# Patient Record
Sex: FEMALE | Race: ASIAN | NOT HISPANIC OR LATINO | Employment: UNEMPLOYED | URBAN - METROPOLITAN AREA
[De-identification: names, ages, dates, MRNs, and addresses within clinical notes are randomized per-mention and may not be internally consistent; named-entity substitution may affect disease eponyms.]

---

## 2017-03-17 ENCOUNTER — HOSPITAL ENCOUNTER (OUTPATIENT)
Dept: ULTRASOUND IMAGING | Facility: CLINIC | Age: 22
Discharge: HOME | End: 2017-03-17
Attending: FAMILY MEDICINE

## 2017-03-17 DIAGNOSIS — Z34.90 PREGNANCY AT EARLY STAGE: ICD-10-CM

## 2017-03-17 DIAGNOSIS — Z33.1 PREGNANT STATE, INCIDENTAL: ICD-10-CM

## 2017-10-03 ENCOUNTER — RECORDS - HEALTHEAST (OUTPATIENT)
Dept: ADMINISTRATIVE | Facility: OTHER | Age: 22
End: 2017-10-03

## 2017-10-03 ENCOUNTER — COMMUNICATION - HEALTHEAST (OUTPATIENT)
Dept: ADMINISTRATIVE | Facility: CLINIC | Age: 22
End: 2017-10-03

## 2017-10-05 ENCOUNTER — AMBULATORY - HEALTHEAST (OUTPATIENT)
Dept: EDUCATION SERVICES | Facility: CLINIC | Age: 22
End: 2017-10-05

## 2017-10-05 DIAGNOSIS — O24.419 GESTATIONAL DIABETES MELLITUS (GDM), ANTEPARTUM, GESTATIONAL DIABETES METHOD OF CONTROL UNSPECIFIED: ICD-10-CM

## 2017-10-11 ENCOUNTER — AMBULATORY - HEALTHEAST (OUTPATIENT)
Dept: EDUCATION SERVICES | Facility: CLINIC | Age: 22
End: 2017-10-11

## 2017-10-11 DIAGNOSIS — O24.419 GESTATIONAL DIABETES MELLITUS (GDM), ANTEPARTUM, GESTATIONAL DIABETES METHOD OF CONTROL UNSPECIFIED: ICD-10-CM

## 2017-11-06 ENCOUNTER — RECORDS - HEALTHEAST (OUTPATIENT)
Dept: ADMINISTRATIVE | Facility: OTHER | Age: 22
End: 2017-11-06

## 2017-11-07 ENCOUNTER — ANESTHESIA - HEALTHEAST (OUTPATIENT)
Dept: OBGYN | Facility: CLINIC | Age: 22
End: 2017-11-07

## 2017-11-09 ENCOUNTER — HOME CARE/HOSPICE - HEALTHEAST (OUTPATIENT)
Dept: HOME HEALTH SERVICES | Facility: HOME HEALTH | Age: 22
End: 2017-11-09

## 2017-11-12 ENCOUNTER — HOME CARE/HOSPICE - HEALTHEAST (OUTPATIENT)
Dept: HOME HEALTH SERVICES | Facility: HOME HEALTH | Age: 22
End: 2017-11-12

## 2019-01-06 ENCOUNTER — OFFICE VISIT - HEALTHEAST (OUTPATIENT)
Dept: FAMILY MEDICINE | Facility: CLINIC | Age: 24
End: 2019-01-06

## 2019-01-06 DIAGNOSIS — R05.9 COUGH: ICD-10-CM

## 2019-06-27 ENCOUNTER — COMMUNICATION - HEALTHEAST (OUTPATIENT)
Dept: TELEHEALTH | Facility: CLINIC | Age: 24
End: 2019-06-27

## 2019-06-27 ENCOUNTER — OFFICE VISIT - HEALTHEAST (OUTPATIENT)
Dept: FAMILY MEDICINE | Facility: CLINIC | Age: 24
End: 2019-06-27

## 2019-06-27 DIAGNOSIS — L65.9 HAIR LOSS: ICD-10-CM

## 2019-06-27 DIAGNOSIS — E03.9 HYPOTHYROIDISM, UNSPECIFIED TYPE: ICD-10-CM

## 2019-06-27 LAB — HGB BLD-MCNC: 12 G/DL (ref 12–16)

## 2019-06-27 ASSESSMENT — MIFFLIN-ST. JEOR: SCORE: 1276.13

## 2019-06-28 LAB
FERRITIN SERPL-MCNC: 51 NG/ML (ref 10–130)
IRON SATN MFR SERPL: 8 % (ref 20–50)
IRON SERPL-MCNC: 28 UG/DL (ref 42–175)
TIBC SERPL-MCNC: 372 UG/DL (ref 313–563)
TRANSFERRIN SERPL-MCNC: 297 MG/DL (ref 212–360)
TSH SERPL DL<=0.005 MIU/L-ACNC: 0.06 UIU/ML (ref 0.3–5)

## 2019-07-02 ENCOUNTER — AMBULATORY - HEALTHEAST (OUTPATIENT)
Dept: FAMILY MEDICINE | Facility: CLINIC | Age: 24
End: 2019-07-02

## 2019-07-02 DIAGNOSIS — E03.9 HYPOTHYROIDISM, UNSPECIFIED TYPE: ICD-10-CM

## 2020-01-20 ENCOUNTER — COMMUNICATION - HEALTHEAST (OUTPATIENT)
Dept: SCHEDULING | Facility: CLINIC | Age: 25
End: 2020-01-20

## 2020-01-20 DIAGNOSIS — E03.9 HYPOTHYROIDISM, UNSPECIFIED TYPE: ICD-10-CM

## 2020-01-22 ENCOUNTER — COMMUNICATION - HEALTHEAST (OUTPATIENT)
Dept: FAMILY MEDICINE | Facility: CLINIC | Age: 25
End: 2020-01-22

## 2020-03-19 ENCOUNTER — COMMUNICATION - HEALTHEAST (OUTPATIENT)
Dept: ADMINISTRATIVE | Facility: CLINIC | Age: 25
End: 2020-03-19

## 2020-03-20 ENCOUNTER — OFFICE VISIT - HEALTHEAST (OUTPATIENT)
Dept: MIDWIFE SERVICES | Facility: CLINIC | Age: 25
End: 2020-03-20

## 2020-03-20 DIAGNOSIS — N92.6 IRREGULAR MENSES: ICD-10-CM

## 2020-03-20 DIAGNOSIS — E03.9 HYPOTHYROIDISM, UNSPECIFIED TYPE: ICD-10-CM

## 2020-03-20 ASSESSMENT — MIFFLIN-ST. JEOR: SCORE: 1321.49

## 2020-05-18 ENCOUNTER — COMMUNICATION - HEALTHEAST (OUTPATIENT)
Dept: FAMILY MEDICINE | Facility: CLINIC | Age: 25
End: 2020-05-18

## 2020-05-18 ENCOUNTER — COMMUNICATION - HEALTHEAST (OUTPATIENT)
Dept: SCHEDULING | Facility: CLINIC | Age: 25
End: 2020-05-18

## 2020-05-18 DIAGNOSIS — E03.9 HYPOTHYROIDISM, UNSPECIFIED TYPE: ICD-10-CM

## 2020-07-14 ENCOUNTER — AMBULATORY - HEALTHEAST (OUTPATIENT)
Dept: LAB | Facility: CLINIC | Age: 25
End: 2020-07-14

## 2020-07-14 DIAGNOSIS — E03.9 HYPOTHYROIDISM, UNSPECIFIED TYPE: ICD-10-CM

## 2020-07-15 ENCOUNTER — COMMUNICATION - HEALTHEAST (OUTPATIENT)
Dept: FAMILY MEDICINE | Facility: CLINIC | Age: 25
End: 2020-07-15

## 2020-07-15 DIAGNOSIS — E03.9 HYPOTHYROIDISM, UNSPECIFIED TYPE: ICD-10-CM

## 2020-07-15 DIAGNOSIS — N97.9 FEMALE FERTILITY PROBLEM: ICD-10-CM

## 2020-07-15 LAB — TSH SERPL DL<=0.005 MIU/L-ACNC: 1.9 UIU/ML (ref 0.3–5)

## 2020-07-16 ENCOUNTER — COMMUNICATION - HEALTHEAST (OUTPATIENT)
Dept: FAMILY MEDICINE | Facility: CLINIC | Age: 25
End: 2020-07-16

## 2020-07-21 RX ORDER — LEVOTHYROXINE SODIUM 88 UG/1
88 TABLET ORAL DAILY
Qty: 90 TABLET | Refills: 0 | Status: SHIPPED | OUTPATIENT
Start: 2020-07-21 | End: 2022-01-24

## 2020-09-10 ENCOUNTER — RECORDS - HEALTHEAST (OUTPATIENT)
Dept: ADMINISTRATIVE | Facility: OTHER | Age: 25
End: 2020-09-10

## 2020-09-15 ENCOUNTER — RECORDS - HEALTHEAST (OUTPATIENT)
Dept: ADMINISTRATIVE | Facility: OTHER | Age: 25
End: 2020-09-15

## 2020-09-24 ENCOUNTER — RECORDS - HEALTHEAST (OUTPATIENT)
Dept: ADMINISTRATIVE | Facility: OTHER | Age: 25
End: 2020-09-24

## 2021-03-17 ENCOUNTER — COMMUNICATION - HEALTHEAST (OUTPATIENT)
Dept: ADMINISTRATIVE | Facility: CLINIC | Age: 26
End: 2021-03-17

## 2021-05-30 NOTE — PATIENT INSTRUCTIONS - HE
Labs today to check the thyroid and assess other causes for hair loss.     I've sent a refill of the levothyroxine to your pharmacy. Go ahead and start that up again. Remember to take on an empty stomach at the same time every day.      Thank you for coming in today!    If you receive a survey from [employer] about your experience today, it would be very helpful if you could fill it out to let us know what went well and what we can improve!    General Information:    Today you had your appointment with Gavino Fairchild NP    My hours are:    Monday : Out of clinic  Tuesday : 8:00AM - 5:00 PM  Wednesday: 8:00AM - 5:00 PM  Thursday: 8:00AM - 5:00 PM  Friday: 8:00AM - 5:00 PM    I am not in the office Mondays. Therefore non-urgent calls and medical messages received on Monday will be addressed when I am back in the office on Tuesday. Urgent matters will be reviewed and addressed by one of my partners in the office as needed.    If lab work was done today as part of your evaluation you will generally be contacted via [employer], mail, or phone with the results within 1-5 days. If there is an alarming result we will contact you by phone. Lab results come back at varying times, I generally wait until all lab results are available before making comments on the results.     If you need refills please contact your pharmacy. They will send a refill request to me to review. Please allow 3-5 business days for us to process all refill requests.     My Clinical Assistant is Brie. Please call us at [phone] or send a medical message with any questions or concerns.

## 2021-05-30 NOTE — PROGRESS NOTES
Chief Complaint   Patient presents with     Establish Care     Medication Refill     med check and labs      HPI: Patient presents to establish care and also get refills of her levothyroxine.  She is currently taking 100 mcg, but admits that she has been out of the medication for the last couple of days.  She says that she was diagnosed with hypothyroidism when she was pregnant with her child approximately 2 years ago.  She was asymptomatic at that time.  Since the birth of her child she thinks that she is also having some hair loss issues still, but it seems to have improved since her levothyroxine was increased from 50 mcg to 100 mcg.  She thinks there has been a little bit of weight gain.  She questions whether or not she needs to continue taking the levothyroxine medication consistently.    ROS:Review of Systems - History obtained from the patient  General ROS: negative  Ophthalmic ROS: negative  ENT ROS: negative  Endocrine ROS: negative  Respiratory ROS: negative  Cardiovascular ROS: negative  Dermatological ROS: positive for hair changes    SH: The Patient's  reports that she has never smoked. She has never used smokeless tobacco. She reports that she does not drink alcohol or use drugs.      FH: The Patient's family history includes Diabetes in her father; Hypertension in her mother; Stroke in her mother.     Meds:    Current Outpatient Medications on File Prior to Visit   Medication Sig Dispense Refill     acetaminophen (TYLENOL) 325 MG tablet Take 1-2 tablets (325-650 mg total) by mouth every 4 (four) hours as needed.  0     docusate sodium (COLACE) 100 MG capsule Take 1 capsule (100 mg total) by mouth daily.  0     ibuprofen (ADVIL,MOTRIN) 800 MG tablet Take 1 tablet (800 mg total) by mouth every 8 (eight) hours.  0     prenatal vitamin iron-folic acid 27mg-0.8mg (PRENATAL S) 27 mg iron- 800 mcg Tab tablet Take 1 tablet by mouth daily.       No current facility-administered medications on file prior to  "visit.        O:  /60   Pulse 79   Ht 4' 11\" (1.499 m)   Wt 139 lb (63 kg)   LMP 05/28/2019 (Approximate)   SpO2 98%   Breastfeeding? No   BMI 28.07 kg/m      Physical Examination:   General appearance - alert, well appearing, and in no distress  Mental status - alert, oriented to person, place, and time  Eyes - pupils equal and reactive, extraocular eye movements intact  Mouth - mucous membranes moist, pharynx normal without lesions  Neck - supple  Lymphatics - no palpable lymphadenopathy, no hepatosplenomegaly  Chest - clear to auscultation, no wheezes, rales or rhonchi, symmetric air entry  Heart - normal rate and regular rhythm, S1 and S2 normal, no murmurs noted  Neurological - alert, oriented, normal speech, no focal findings or movement disorder noted, neck supple without rigidity, cranial nerves II through XII intact, motor and sensory grossly normal bilaterally, normal muscle tone, no tremors, strength 5/5  Musculoskeletal - no joint tenderness, deformity or swelling  Extremities - peripheral pulses normal, no pedal edema, no clubbing or cyanosis  Skin - normal coloration and turgor, no rashes, no suspicious skin lesions noted.  Some diffuse hair thinning noted.  No rash or lesion on the scalp.  Scalp is pink and healthy.      A/P:     Problem List Items Addressed This Visit        ENT/CARD/PULM/ENDO Problems    Hypothyroidism    Relevant Medications    levothyroxine (SYNTHROID, LEVOTHROID) 100 MCG tablet    Other Relevant Orders    Thyroid Stimulating Hormone (TSH)      Other Visit Diagnoses     Hair loss    -  Primary    Relevant Orders    Iron and Transferrin Iron Binding Capacity    Ferritin    Hemoglobin (Completed)            1. Hypothyroidism, unspecified type  Refill of levothyroxine sent to the pharmacy.  Check TSH.  Numbers may be a little inaccurate because she has not taken the medication for the past 2 days, so we will have to adjust accordingly.    - Thyroid Stimulating Hormone " (TSH)  - levothyroxine (SYNTHROID, LEVOTHROID) 100 MCG tablet; Take 1 tablet (100 mcg total) by mouth daily.  Dispense: 90 tablet; Refill: 0    2. Hair loss  She thinks that her hair loss is improving after adjusting her dose of levothyroxine.  Rule out iron deficiency anemia as well.    - Iron and Transferrin Iron Binding Capacity  - Ferritin  - Hemoglobin        Gavino Fairchild, CNP

## 2021-05-31 VITALS — WEIGHT: 138.5 LBS | BODY MASS INDEX: 27.05 KG/M2

## 2021-05-31 VITALS — BODY MASS INDEX: 26.95 KG/M2 | WEIGHT: 138 LBS

## 2021-06-02 VITALS — BODY MASS INDEX: 27.73 KG/M2 | WEIGHT: 142 LBS

## 2021-06-03 VITALS — HEIGHT: 59 IN | WEIGHT: 139 LBS | BODY MASS INDEX: 28.02 KG/M2

## 2021-06-04 VITALS — WEIGHT: 149 LBS | BODY MASS INDEX: 30.04 KG/M2 | HEIGHT: 59 IN

## 2021-06-05 NOTE — TELEPHONE ENCOUNTER
I spoke with pt. She would like to know if she can come in for a lab only apt instead of a med check with Gavino.  Her insurance does not cover very much of her apt. Pt is aware that she may need to be seen and that a lab only apt may not be appropriate. Please advise whether pt needs apt of if lab only apt is ok.

## 2021-06-05 NOTE — TELEPHONE ENCOUNTER
RN cannot approve Refill Request    RN can NOT refill this medication Protocol failed and NO refill given.       Viviana Pa, [facility] Triage/Med Refill 1/20/2020    Requested Prescriptions   Pending Prescriptions Disp Refills     levothyroxine (SYNTHROID) 88 MCG tablet 90 tablet 0     Sig: Take 1 tablet (88 mcg total) by mouth daily.       Thyroid Hormones Protocol Failed - 1/20/2020  9:30 AM        Failed - Provider visit in past 12 months or next 3 months     Last office visit with prescriber/PCP: Visit date not found OR same dept: Visit date not found OR same specialty: Visit date not found  Last physical: Visit date not found Last MTM visit: Visit date not found   Next visit within 3 mo: Visit date not found  Next physical within 3 mo: Visit date not found  Prescriber OR PCP: Magdalena Faria MD  Last diagnosis associated with med order: 1. Hypothyroidism, unspecified type  - levothyroxine (SYNTHROID) 88 MCG tablet; Take 1 tablet (88 mcg total) by mouth daily.  Dispense: 90 tablet; Refill: 0    If protocol passes may refill for 12 months if within 3 months of last provider visit (or a total of 15 months).             Passed - TSH on file in past 12 months for patient age 12 & older     TSH   Date Value Ref Range Status   06/27/2019 0.06 (L) 0.30 - 5.00 uIU/mL Final

## 2021-06-05 NOTE — TELEPHONE ENCOUNTER
I did 30-day refill on the levothyroxine.  She is overdue for recheck-back in June Gavino had advised her for follow-up because of her having missed pills at that time.  Recommend she schedule med check visit with him.

## 2021-06-05 NOTE — TELEPHONE ENCOUNTER
Lab orders placed. I'll have to see her this summer which we can do as a physical which should be 100% covered for preventative care.    Gavino Fairchild, CNP

## 2021-06-07 NOTE — PROGRESS NOTES
"Juan J Burgess is a 24 y.o. female who is being evaluated via a billable telephone visit.      The patient has been notified of following:     \"This telephone visit will be conducted via a call between you and your physician/provider. We have found that certain health care needs can be provided without the need for a physical exam.  This service lets us provide the care you need with a short phone conversation.  If a prescription is necessary we can send it directly to your pharmacy.  If lab work is needed we can place an order for that and you can then stop by our lab to have the test done at a later time.    If during the course of the call the physician/provider feels a telephone visit is not appropriate, you will not be charged for this service.\"     Juan J Burgess complains of    Chief Complaint   Patient presents with     Infertility     irregular periods, neg home UPT       I have reviewed and updated the patient's Past Medical History, Social History, Family History and Medication List.    ALLERGIES  Patient has no known allergies.    Additional provider notes:   Spoke with Juan J over the phone regarding her concern for delayed fertility and irregular menstrual cycle. She is established with [facility] but is new to the midwifery department. She desires a pregnancy and does not use any birth control methods.    Menses returned 2 months following her last birth 11/2017, returned regularly with flow lasting 3-4 days until October 2019 when missed her period, then abnormally short in Dec 2019, January 2020 was brown discharge, February 2020 was missed and then spotting in March 2020.   Negative home UPT.  She reports her primary care has been disrupted by international travel. She had regular care during her pregnancy in 2017 significant for hypothyroidism with medication management but levels not well controlled per labs in 7/2017 and 11/2017 as well as gestational diabetes. She did " establish care at [facility] 6/2019, was not taking medication regularly but prescribed 100mcg synthroid prior and at 6/2019 visit found to have TSH 0.06uIU/mL. She was instructed to continue at 100mcg synthroid daily and given 90 tablets with a future lab ordered. She did not return to her care and notes she had a move and lost her pills; this was refilled at 88mcg for 30 days on 1/20/2020. She did not take this medication regularly and currently has 30 days of medication still available.    Assessment/Plan:  1. Irregular menses  -Discussed the role of thyroid on the menstrual cycle and importance of her ongoing primary care to establish a healthy thyroid level to include follow up visit, labs and determination of medication management. Emphasized importance of ongoing care and medication as prescribed.   -Advised on panel of infertility labs as needed upon better thyroid control.  -continue current PNV    2. Hypothyroidism, unspecified type  -to be followed by PCP      Phone call duration: [phone], 13 minutes    Caryn Quezada, RADHA, APRN, CNM  [facility]  Midwifery      Shivani Michele, MARIAH

## 2021-06-07 NOTE — PATIENT INSTRUCTIONS - HE
A Healthy You!    Getting and Staying Active  Why should I exercise?   Exercise, being physically active, is very important for all women.   Being active can help you:   Lose or maintain weight   Have more energy   Sleep better   Enjoy sex more   Relieve stress and think better   Lower the chance you will have heart disease, high blood pressure, and diabetes   Strengthen your bones and muscles   Have fewer hot flashes if you are older   How active do I have to be to get the bene?ts of exercise?   Studies show that as little as 15 minutes of moderate exercise--like fast walking or dancing--3 times a week can improve the health of your heart. Ifyou want to get the best benefits from exercise, try to increase your physical activity to at least 30 minutes, 5 times a week. If you have a serious health problem,be sure to talk with your health care provider before starting an exercise program.    Taking Care of your Health: Health Care Maintenance Screening recommendations  In all the things women do, taking care of everything and everybody else, they sometimes forget to take care of themselves. This handout reviews the health screenings and vaccines that are recommended for women of all ages. Talk with yourhealth care provider about which tests and vaccines you need. The chart below lists the screening tests and vaccinations recommended for healthy women who do not have a high risk for most diseases.   The recommendations in thischart are guidelines only. For some tests and vaccines, the chart says you should talk to your health care provider.This is because the best recommendations for you depend on your personal health history. Your health care provider may suggest more frequent testing or additional tests ifyou havea higher chance of getting some diseases    Planning Your Family: Developing a Reproductive Life Plan    Planning ahead can help you avoid getting pregnant when you don t want to be pregnant and also be in  "good health if and when you do decide to become pregnant. Many women have at least one pregnancy in their lives, even if it was not planned. In fact, about half of all pregnancies are not planned. Getting pregnant when you did not plan it can be a problem, or it can turn into a happy event. Planning pregnancy leads to healthier pregnancies, healthier mothers, and healthier families.  Although many women want to have a family, not everyone wants to have children. More and more women are childless by choice (also known as childfree). Whether to have children is a personal choice that only you can make. It s okay not to want children! If you never want to get pregnant, it is important to make sure you always use very effective birth control, such as an intrauterine device, the birth control implant, female sterilization (having your tubes tied), or your partner having a vasectomy.    Reliable resources:        American College of Nurse-Midwives (ACNM) http://www.midwife.org/; look at the informational handouts at http://www.midwife.org/Share-With-Women        Women's Health.gov:  http://www.womenshealth.gov/a-z-topics/index.html    FDA - Nutrition  www.mypyramid.gov  Under \"For Consumers,\" click on \"pregnant and breastfeeding women.\"      Vaccines : Centers for Disease Control and Prevention (CDC) http://www.cdc.gov/vaccines/     [facility] www.[facility].com     When researching information on the web, question the validity of websites.  The Reflex .gov, .edu and.org tend to be more reliable information.  If there are a lot of advertisements, be cautious of the information provided. Stay away from blogs and chat rooms please!          Nutrition and supplements:     Daily multivitamin vitamin (with 400 - 1000 mcg folic acid).  Take with food as needed.     4-5 servings of dairy or other calcium rich foods (fish, leafy greens, soy) per day - if not, take 500-1000 mg additional calcium (Tums, pills, chews). Take with " dairy.     Vitamin D3 4000 IU geltab daily. Vitamin D rich foods: Cod Liver Oil (1Tbsp), [place], Mackerel, Tuna, fortified milk and yogurt, Beef and liver, sardines, egg, fortified cereals, swiss cheese.  Take supplements with fattiest meal.       2-3 (4) oz servings of fish, seafood, nuts (walnuts & almonds), oils, avocado per week - if not, take Omega 3 Fatty acids: DHA & KIYA 1187-3684 mg per day.  Other names: cod liver oil, fish oil. Take with fattiest meal.

## 2021-06-07 NOTE — TELEPHONE ENCOUNTER
Pt wanted to be seen for infertility - appt was postponed due to Covid 19. She would be new to the [facility] group but has seen formerly [facility] providers. She called back today wanting do a tele visit re her history of irreg periods in the last year, missing a month, then having brownish menses, neg pregnancy tests, and the inability to conceive? Can a tele visit be scheduled or no d/t not being seen by CNMs before?

## 2021-06-08 NOTE — TELEPHONE ENCOUNTER
Incoming refill request for levothyroxine to [facility]'s pharmacy     Last ov 6/27/19 adv RTC in one year  Last labs 6/27/19  Per refill request message on 1/20/20 Gavino agreed to lab only check at the time and then to schedule a physical late June/ July so insurance would cover her visit under the physical.  Pt did not end up doing lab only visit.

## 2021-06-08 NOTE — TELEPHONE ENCOUNTER
Refill sent. Please have her schedule a physical for July for additional refills.    Gavino Fairchild, CNP

## 2021-06-13 NOTE — PROGRESS NOTES
[facility] GDM Care Plan    Assessment: Juan J is here with her  Rohan for her initial education for Gestational Diabetes.  This is her first pregnancy and first with GDM.      Plan:    Provider: Dr. JAVED Ramirez/ Amber Coker CNP  Provider's Diagnosis (per referral form): Gestational (648.83)    Weight: 138 lb (62.6 kg)  OGTT: 90/181/161/125  EDC: Estimated Date of Delivery: 11/9/17   Pregnancy #: 1  Previous GDM: No  Medications:   Current Outpatient Prescriptions:      prenatal vitamin iron-folic acid 27mg-0.8mg (PRENATAL S) 27 mg iron- 800 mcg Tab tablet, Take 1 tablet by mouth daily., Disp: , Rfl:   Supplements: No  PNV: Yes  Meter: OneTouch Ultra  BG: Juan J has been checking her glucose for several weeks.  All her fasting readings are within range.  She does have some higher post prandial readings.  In reviewing her log book they are more dietary indiscretions.  Her biggest culprits are caramels and coconut bread.  We talked about avoiding these for the next week and return for review of her glucose.    All additional questions and concerns addressed.    BG monitoring goals: Fasting <95; 1 hour post start of meal <140. Test 4 x per day.  Check fasting a.m. ketones: Yes  GDM meal pattern/carb counting taught per guidelines: Yes  Goals: Nutrition: GDM meal plan  Activity:  Walking after meals when able/staying active  Monitoring:  BG 4x/day as directed, ketones every morning    F/u in 1 week to assess BG and food log     DIABETES CARE PLAN AND EDUCATION RECORD    Gestational Diabetes Disease Process/Preconception Care/Management During Pregnancy/Postpartum:    Meter (per above goals): Discussed, Literature provided and Patient returned demonstration    Nutrition Management    Weight: Assessed, Discussed and Literature provided  Portions/Balance: Assessed, Discussed and Literature provided  Carb ID/Count: Assessed, Discussed and Literature provided  Label Reading: Assessed, Discussed and Literature  provided  Menu Planning: Assessed, Discussed and Literature provided  Dining Out: Assessed, Discussed and Literature provided  Physical Activity: Discussed and Literature provided    Acute Complications: Prevent, Detect, Treat:    Goal Setting and Problem Solving: Assessed and Discussed  Barriers: Assessed and Discussed  Psychosocial Adjustments: Assessed and Discussed      Time: 90 minutes  Visit Type: GDM Group  Visit #: 1

## 2021-06-13 NOTE — PROGRESS NOTES
[facility] GDM Care Plan      Time: 30 minutes  Visit Type: GDM Individual Follow-up  Visit #: 2    Provider: Dr. JAVED Ramirez  Provider's Diagnosis (per referral form): Gestational (648.83)    Weight: 138 lb 8 oz (62.8 kg)  OGTT:90/181/161/135EDC: Estimated Date of Delivery: 11/9/17   Pregnancy #: 1  Previous GDM: No  Medications:   Current Outpatient Prescriptions:      prenatal vitamin iron-folic acid 27mg-0.8mg (PRENATAL S) 27 mg iron- 800 mcg Tab tablet, Take 1 tablet by mouth daily., Disp: , Rfl:   Supplements: No  PNV: Yes  BG: Juan J is here alone for her follow-up.  She brings in her logbook and we review her meal choices and glucose readings.  She has been trying harder to watch her carb intake at meals and this is apparent.  Her readings over the last week are noted below:  F-68/68/81/79/66  B-124/96/103/157/118  L-138/102/117/136  D-121/107/139/154/128  She is not checking ketones  Juan J is doing great.  She is almost 36 weeks and we discussed she might see some changes in her glucose readings, going down, if this has not already happened.  Discussed her lower fasting readings, stated she does not feel dizzy, lightheaded, sweaty or nauseated when she wakes.  Discussed to call if this is happening and we could discuss how to manage this.  All additional questions and concerns addressed.  Today will be patient's last visit.  Discussed continuing to check glucose 4 times a day and following meal plan until the day of delivery.    After you deliver the baby, it is suggested to check your blood sugar occasionally (1 - 2 times per week) for 6 weeks.   When you are not pregnant, normal blood sugars are:     Fasting (before eating anything in the morning)  - under 100 mg/dl  2 hours after meals under 140  The American Diabetes Association recommends:   a glucose tolerance test 6 weeks after you deliver the baby.      a hemoglobin Alc test yearly after delivery.  The Alc test is a 2-3 month average blood sugar  reading.       Discuss getting these tests with your provider.    After delivery, and your provider has said that it is safe to be active, work toward getting 150 minutes each week of physical activity to decrease your risk of  getting type 2 diabetes.    Maintaining a healthy body weight will also decrease your risk of getting type 2 diabetes.     BG monitoring goals: Fasting <95; 1 hour post start of meal <140. Test 4 x per day.  Check fasting a.m. ketones: No  GDM meal pattern/carb counting taught per guidelines: Yes    DIABETES CARE PLAN AND EDUCATION RECORD    Gestational Diabetes Disease Process/Preconception Care/Management During Pregnancy/Postpartum:Assessed and Discussed    Meter (per above goals): Assessed and Discussed    Nutrition Management    Weight: Assessed and Discussed  Portions/Balance: Assessed and discussed  Carb ID/Count: Assessed and discussed  Label Reading: Assessed and discussed  Menu Planning: Assessed and Discussed  Dining Out: Assessed and Discussed  Physical Activity: Assessed and Discussed  Medications: Assessed and Discussed    Acute Complications: Prevent, Detect, Treat:    Hypoglycemia: Assessed and Discussed  Hyperglycemia: Assessed and Discussed  Goal Setting and Problem Solving: Assessed and Discussed  Barriers: Assessed and Discussed  Psychosocial Adjustments: Assessed and Discussed

## 2021-06-14 NOTE — ANESTHESIA PREPROCEDURE EVALUATION
Anesthesia Evaluation      Patient summary reviewed   No history of anesthetic complications     Airway   Mallampati: II   Pulmonary - negative ROS and normal exam                          Cardiovascular - negative ROS and normal exam   Neuro/Psych - negative ROS     Endo/Other    (+) pregnant     GI/Hepatic/Renal - negative ROS           Dental                         Anesthesia Plan  Planned anesthetic: epidural    ASA 2     Anesthetic plan and risks discussed with: patient            Anatoly Bedolla

## 2021-06-14 NOTE — ANESTHESIA PROCEDURE NOTES
Epidural Block    Patient location during procedure: OB  Time Called: 11/7/2017 2:30 PM  Reason for Block:labor epidural  Staffing:  Performing  Anesthesiologist: ANAHI HARMON  Preanesthetic Checklist  Completed: patient identified, risks, benefits, and alternatives discussed, timeout performed, consent obtained, at patient's request, airway assessed, oxygen available, suction available, emergency drugs available and hand hygiene performed  Procedure  Patient position: sitting  Prep: ChloraPrep  Patient monitoring: continuous pulse oximetry, heart rate and blood pressure  Approach: midline  Location: L3-L4  Injection technique: AUGUSTIN saline  Number of Attempts:1  Needle  Needle type: Albaro   Needle gauge: 17 G     Catheter in Space: 5  Assessment  Sensory level:  No complications

## 2021-06-14 NOTE — ANESTHESIA POSTPROCEDURE EVALUATION
Patient: Juan J Burgess  * No procedures listed *  Anesthesia type: epidural    Patient location: PACU  Last vitals:   Vitals:    11/08/17 0119   BP: 121/61   Pulse: 86   Resp: 16   Temp: 37.2  C (98.9  F)   SpO2:      Post vital signs: stable  Level of consciousness: awake and responds to simple questions  Post-anesthesia pain: pain controlled  Post-anesthesia nausea and vomiting: no  Pulmonary: unassisted, return to baseline  Cardiovascular: stable and blood pressure at baseline  Hydration: adequate  Anesthetic events: no    QCDR Measures:  ASA# 11 - Jazmyn-op Cardiac Arrest: ASA11B - Patient did NOT experience unanticipated cardiac arrest  ASA# 12 - Jazmyn-op Mortality Rate: ASA12B - Patient did NOT die  ASA# 13 - PACU Re-Intubation Rate: ASA13B - Patient did NOT require a new airway mgmt  ASA# 10 - Composite Anes Safety: ASA10A - No serious adverse event    Additional Notes:    Anatoly Bedolla

## 2021-06-16 NOTE — TELEPHONE ENCOUNTER
Pt saw us a while back for irregular menses and she has been having her regular cycle ever since but now this month she is 6 days late and has taken 2 pregnancy tests and they have both been negative.She is wondering if it might just be too early or if she is just not pregnant?

## 2021-06-16 NOTE — TELEPHONE ENCOUNTER
TC: Spoke with Juan J and she states she thought she was calling [facility]'s [place] [facility]. Offered to provide their contact information but she declined and states she has it/can google it. Offers no questions.

## 2021-06-20 NOTE — LETTER
Letter by Gavino Fairchild CNP at      Author: Gavino Fairchild CNP Service: -- Author Type: --    Filed:  Encounter Date: 1/22/2020 Status: (Other)           Juan J Burgess  [address]  [place] [address]      January 22, 2020      Dear Juan J    In reviewing your records, we have determined a gap in your preventive services. Based on your age and health history, we recommend the following service(s):      ? Physical with a Pap Smear        If you have had the service elsewhere, please contact us so we can update our records. Please let us know if you have transferred your care to another clinic.    Please call [phone] to schedule this appointment. Fax number [phone].    We believe that a strong preventive care program, including regular physicals and follow-up care is an important part of a healthy lifestyle and we are committed to helping you maintain your health.    Thank you for choosing us as your health care provider.        Sincerely,         [facility]

## 2021-06-22 NOTE — PROGRESS NOTES
Chief Complaint   Patient presents with     Cough     Dry cough      Shortness of Breath         HPI      Patient is here for 2 days of nonproductive cough, associated with shortness of breath and wheezing, not improved by albuterol inhaler. She has hx of childhood asthma. No fever, nasal congestion, chest pain.     ROS: Pertinent ROS noted in HPI.     No Known Allergies    Patient Active Problem List   Diagnosis     Fever     Pregnancy       No family history on file.  Social History     Socioeconomic History     Marital status:      Spouse name: Not on file     Number of children: Not on file     Years of education: Not on file     Highest education level: Not on file   Social Needs     Financial resource strain: Not on file     Food insecurity - worry: Not on file     Food insecurity - inability: Not on file     Transportation needs - medical: Not on file     Transportation needs - non-medical: Not on file   Occupational History     Not on file   Tobacco Use     Smoking status: Never Smoker     Smokeless tobacco: Never Used   Substance and Sexual Activity     Alcohol use: No     Comment: very occasionally not while pregnant     Drug use: No     Sexual activity: Yes     Partners: Male   Other Topics Concern     Not on file   Social History Narrative     Not on file         Objective:    Vitals:    01/06/19 0915   BP: 120/64   Pulse: 100   Resp: 22   Temp: 99.1  F (37.3  C)   SpO2: 97%       Gen:NAD  Throat: oropharynx clear, tonsils normal  Nose: no discharge  CV: RRR, normal S1S2, no M, R, G  Pulm: CTAB, normal effort    CXR -no focal pneumonia per my interpretation, discussed during visit.    Xr Chest 2 Views    Result Date: 1/6/2019  XR CHEST 2 VIEWS 1/6/2019 9:30 AM INDICATION: Cough with shortness of breath. COMPARISON: 07/17/2017. FINDINGS: Negative chest.        Cough  -     XR Chest 2 Views  -     albuterol nebulizer solution 3 mL (PROVENTIL); Take 3 mL by nebulization once  -     predniSONE  (DELTASONE) 20 MG tablet; Take 40 mg by mouth daily for 5 days.  -     benzonatate (TESSALON) 200 MG capsule; Take 1 capsule (200 mg total) by mouth 3 (three) times a day as needed for cough.      Likely viral lower respiratory infection complicated by RAD. Patient reported feeling much better after Albuterol neb tx. F/u as directed.

## 2021-06-22 NOTE — PATIENT INSTRUCTIONS - HE
Follow up with your primary care provider if no improvement in three days, sooner in urgent care or ER if your symptoms worsen

## 2021-06-26 ENCOUNTER — HEALTH MAINTENANCE LETTER (OUTPATIENT)
Age: 26
End: 2021-06-26

## 2021-07-14 PROBLEM — Z34.90 PREGNANCY: Status: RESOLVED | Noted: 2017-07-19 | Resolved: 2019-06-27

## 2021-07-14 PROBLEM — Z34.90 PREGNANT: Status: RESOLVED | Noted: 2017-11-06 | Resolved: 2017-11-09

## 2021-10-16 ENCOUNTER — HEALTH MAINTENANCE LETTER (OUTPATIENT)
Age: 26
End: 2021-10-16

## 2022-01-21 ENCOUNTER — TELEPHONE (OUTPATIENT)
Dept: FAMILY MEDICINE | Facility: CLINIC | Age: 27
End: 2022-01-21
Payer: COMMERCIAL

## 2022-01-21 NOTE — TELEPHONE ENCOUNTER
Reason for Call:  Pregnancy Confirmation Needed  does not recall first day of last period/possibly 10/13/21  Prefers a female doctor  Wanting confirmation as soon as possible/but needs to be late in the day.  Would prefer the [facility] Location      PCP: Gavino Fairchild    Can we leave a detailed message on this number? YES    Phone number patient can be reached at: Home number on file [phone] (home)    Best Time: Any    Call taken on 1/21/2022 at 8:24 AM by Jennifer Ceja

## 2022-01-24 ENCOUNTER — HOSPITAL ENCOUNTER (OUTPATIENT)
Dept: ULTRASOUND IMAGING | Facility: HOSPITAL | Age: 27
Discharge: HOME | End: 2022-01-24
Attending: FAMILY MEDICINE | Admitting: FAMILY MEDICINE
Payer: COMMERCIAL

## 2022-01-24 ENCOUNTER — OFFICE VISIT (OUTPATIENT)
Dept: FAMILY MEDICINE | Facility: CLINIC | Age: 27
End: 2022-01-24
Payer: COMMERCIAL

## 2022-01-24 VITALS
BODY MASS INDEX: 29.13 KG/M2 | SYSTOLIC BLOOD PRESSURE: 110 MMHG | OXYGEN SATURATION: 98 % | HEART RATE: 88 BPM | HEIGHT: 59 IN | DIASTOLIC BLOOD PRESSURE: 60 MMHG | WEIGHT: 144.5 LBS

## 2022-01-24 DIAGNOSIS — Z34.90 PREGNANCY WITH FETUS OF UNKNOWN GESTATIONAL AGE: Primary | ICD-10-CM

## 2022-01-24 DIAGNOSIS — Z34.90 PREGNANCY WITH FETUS OF UNKNOWN GESTATIONAL AGE: ICD-10-CM

## 2022-01-24 DIAGNOSIS — E03.9 ACQUIRED HYPOTHYROIDISM: ICD-10-CM

## 2022-01-24 PROBLEM — E28.2 POLYCYSTIC OVARIES: Status: ACTIVE | Noted: 2022-01-24

## 2022-01-24 PROBLEM — N92.6 IRREGULAR PERIODS/MENSTRUAL CYCLES: Status: ACTIVE | Noted: 2022-01-24

## 2022-01-24 PROBLEM — Z86.32 HISTORY OF GESTATIONAL DIABETES MELLITUS: Status: ACTIVE | Noted: 2022-01-24

## 2022-01-24 PROBLEM — E06.3 HASHIMOTO'S DISEASE: Status: ACTIVE | Noted: 2022-01-24

## 2022-01-24 PROBLEM — Z86.39 HISTORY OF HYPOTHYROIDISM: Status: RESOLVED | Noted: 2022-01-24 | Resolved: 2022-01-24

## 2022-01-24 PROBLEM — Z86.39 HISTORY OF HYPOTHYROIDISM: Status: ACTIVE | Noted: 2022-01-24

## 2022-01-24 LAB
HCG UR QL: POSITIVE
T4 FREE SERPL-MCNC: 0.66 NG/DL (ref 0.7–1.8)
TSH SERPL DL<=0.005 MIU/L-ACNC: 83.99 UIU/ML (ref 0.3–5)

## 2022-01-24 PROCEDURE — 76801 OB US < 14 WKS SINGLE FETUS: CPT

## 2022-01-24 PROCEDURE — 99214 OFFICE O/P EST MOD 30 MIN: CPT | Performed by: FAMILY MEDICINE

## 2022-01-24 PROCEDURE — 36415 COLL VENOUS BLD VENIPUNCTURE: CPT | Performed by: FAMILY MEDICINE

## 2022-01-24 PROCEDURE — 84439 ASSAY OF FREE THYROXINE: CPT | Performed by: FAMILY MEDICINE

## 2022-01-24 PROCEDURE — 84443 ASSAY THYROID STIM HORMONE: CPT | Performed by: FAMILY MEDICINE

## 2022-01-24 PROCEDURE — 81025 URINE PREGNANCY TEST: CPT | Performed by: FAMILY MEDICINE

## 2022-01-24 RX ORDER — LEVOTHYROXINE SODIUM 100 UG/1
100 TABLET ORAL DAILY
Qty: 90 TABLET | Refills: 1 | Status: SHIPPED | OUTPATIENT
Start: 2022-01-24 | End: 2022-03-08

## 2022-01-24 ASSESSMENT — MIFFLIN-ST. JEOR: SCORE: 1288.14

## 2022-01-24 NOTE — PROGRESS NOTES
"  Assessment & Plan     Pregnancy with fetus of unknown gestational age  As patient was having irregular menses prior to conception, ordered early ultrasound to confirm gestational age and viability.  Gestational age is much earlier than clinically expected at 5w4d.  Recommended repeat ultrasound in 2 weeks.  Discussed early pregnancy recommendations including taking prenatal vitamin daily and the importance of normal TSH in early pregnancy for fetal cognitive development.  See below for further discussion.  - HCG qualitative urine; Future  - TSH with free T4 reflex; Future  - US OB < 14 Weeks Single; Future    Acquired hypothyroidism  TSH is extremely elevated, patient has not been taking her levothyroxine for unclear reasons.  Recommended restarting at a dose of 100 mcg daily (slight increase from previous dose) with recheck TSH in 4-6 weeks and every 4 weeks thereafter during pregnancy.  - TSH with free T4 reflex; Future             BMI:   Estimated body mass index is 29.69 kg/m  as calculated from the following:    Height as of this encounter: 1.486 m (4' 10.5\").    Weight as of this encounter: 65.5 kg (144 lb 8 oz).         Return in about 2 weeks (around 2/7/2022) for First OB Visit.    Melanie Kennedy MD  [facility]    Radha Arita is a 26 year old who presents for the following health issues with her  Rohan and son:    HPI     Patient presents today with her  for a pregnancy confirmation visit. Her last menstrual period started on 10/13/2021 and she is sure about this date. She possibly had some spotting in December, however. She was previously taking an over-the-counter supplement for her polycystic ovarian syndrome, has not been taking a prenatal vitamin. Patient has been trying to achieve a pregnancy. She did have one previous pregnancy with gestational diabetes that was diet controlled. She was having irregular menses prior to her LMP. She stopped " "taking levothyroxine in October 2021 for unclear reasons. Based on her LMP, gestational age clinically would be 14 weeks 5 days with an EDC of 7/20/2022. She has been feeling well otherwise, denies leakage of fluid or bleeding apart from the spotting in December. She denies significant nausea up to this point.     Labs Reviewed:  Lab Results   Component Value Date    HGB 12.0 06/27/2019    TSH 1.90 07/14/2020       Review of Systems   Constitutional, HEENT, cardiovascular, pulmonary, gi and gu systems are negative, except as otherwise noted.      Objective    /60 (BP Location: Right arm, Patient Position: Sitting, Cuff Size: Adult Regular)   Pulse 88   Ht 1.486 m (4' 10.5\")   Wt 65.5 kg (144 lb 8 oz)   LMP 10/13/2021 (Exact Date)   SpO2 98%   BMI 29.69 kg/m    Body mass index is 29.69 kg/m .  Physical Exam   GENERAL: healthy, alert and no distress  NECK: no adenopathy, no asymmetry, masses, or scars and thyroid normal to palpation  RESP: breathing comfortably, no cough during the visit  CV: regular rate and rhythm, normal S1 S2, no S3 or S4, no murmur, click or rub, no peripheral edema and peripheral pulses strong  MS: no gross musculoskeletal defects noted, no edema  NEURO: Normal strength and tone, mentation intact and speech normal  PSYCH: mentation appears normal, affect normal/bright    Results for orders placed or performed during the hospital encounter of 01/24/22   US OB < 14 Weeks Single     Status: None    Narrative    EXAM: US OB < 14 WEEKS SINGLE-TRANSABDOMINAL  LOCATION: [facility]  DATE/TIME: 1/24/2022 1:27 PM    INDICATION: confirm dates and viability, unsure LMP, irregular menses prior  COMPARISON: None.  TECHNIQUE: Transabdominal scans were performed. Endovaginal ultrasound was performed to better visualize the embryo.    FINDINGS:  UTERUS: Single normal appearing intrauterine gestation sac in the right fundus, angular implantation..  MSD: 9 mm.  CRL: Not " seen.  AMNIOTIC FLUID: Normal.  PLACENTA: Not yet formed. No evidence for sub-chorionic hemorrhage.    RIGHT OVARY: Normal.  LEFT OVARY: Normal.      Impression    IMPRESSION:   1.  There is an IUP with angular implantation on the right measuring 5 weeks and 4 days. Normal yolk sac. No fetal pole yet seen.  2.  EDC would be 09/19/2022.       Results for orders placed or performed in visit on 01/24/22   HCG qualitative urine     Status: Abnormal   Result Value Ref Range    hCG Urine Qualitative Positive (A) Negative   TSH with free T4 reflex     Status: Abnormal   Result Value Ref Range    TSH 83.99 (H) 0.30 - 5.00 uIU/mL   T4 free     Status: Abnormal   Result Value Ref Range    Free T4 0.66 (L) 0.70 - 1.80 ng/dL

## 2022-01-24 NOTE — PATIENT INSTRUCTIONS
I will contact you with the results of the early ultrasound and assist with setting up a first OB visit.  I will try to let you know what your thyroid results are as soon as possible and if we need to restart thyroid hormone medication.

## 2022-01-26 ENCOUNTER — NURSE TRIAGE (OUTPATIENT)
Dept: NURSING | Facility: CLINIC | Age: 27
End: 2022-01-26
Payer: COMMERCIAL

## 2022-01-26 NOTE — TELEPHONE ENCOUNTER
Patient is calling and states that she had an ultrasound on Monday, Jan 24th.  Patient is calling regarding ultrasound.  Patient is currently six weeks pregnant.  Juan J is very anxious about ultrasound results.  Patient is requesting to speak with Melanie Drake.  Patient does not understand what the results mean.  Please phone Juan J within two hours.    COVID 19 Nurse Triage Plan/Patient Instructions    Please be aware that novel coronavirus (COVID-19) may be circulating in the community. If you develop symptoms such as fever, cough, or SOB or if you have concerns about the presence of another infection including coronavirus (COVID-19), please contact your health care provider or visit [email or web address].     Disposition/Instructions    Home care recommended. Follow home care protocol based instructions.    Thank you for taking steps to prevent the spread of this virus.  o Limit your contact with others.  o Wear a simple mask to cover your cough.  o Wash your hands well and often.    Resources    [facility]: About COVID-19: [email or web address]    CDC: What to Do If You're Sick: www.cdc.gov/coronavirus/2019-ncov/about/steps-when-sick.html    CDC: Ending Home Isolation: www.cdc.gov/coronavirus/2019-ncov/hcp/disposition-in-home-patients.html     CDC: Caring for Someone: www.cdc.gov/coronavirus/2019-ncov/if-you-are-sick/care-for-someone.html     [facility]: Interim Guidance for Hospital Discharge to Home: www.health.[facility].[place]/diseases/coronavirus/hcp/hospdischarge.pdf    [facility] clinical trials (COVID-19 research studies): [email or web address]-clinical-trials     Below are the COVID-19 hotlines at the [place] Department of Health ([facility]). Interpreters are available.   o For health questions: Call 312-324-4329 or 1-682.860.2341 (7 a.m. to 7 p.m.)  o For questions about schools and childcare: Call [phone] or 1-855.750.3775 (7 a.m. to 7 p.m.)

## 2022-01-26 NOTE — TELEPHONE ENCOUNTER
Called and spoke with patient. She had questions about her ultrasound showing that she has not as far along as we thought. She verbalizes understanding of the need to recheck this ultrasound in a few weeks. She was given the number to call and schedule, order is in. She has no further questions.

## 2022-01-30 PROBLEM — Z34.90 PREGNANCY WITH FETUS OF UNKNOWN GESTATIONAL AGE: Status: ACTIVE | Noted: 2022-01-30

## 2022-02-14 ENCOUNTER — HOSPITAL ENCOUNTER (OUTPATIENT)
Dept: ULTRASOUND IMAGING | Facility: HOSPITAL | Age: 27
Discharge: HOME | End: 2022-02-14
Attending: FAMILY MEDICINE | Admitting: FAMILY MEDICINE
Payer: COMMERCIAL

## 2022-02-14 DIAGNOSIS — Z34.90 PREGNANCY WITH FETUS OF UNKNOWN GESTATIONAL AGE: ICD-10-CM

## 2022-02-14 DIAGNOSIS — N92.6 IRREGULAR PERIODS/MENSTRUAL CYCLES: ICD-10-CM

## 2022-02-14 PROCEDURE — 76801 OB US < 14 WKS SINGLE FETUS: CPT

## 2022-02-15 ENCOUNTER — TELEPHONE (OUTPATIENT)
Dept: FAMILY MEDICINE | Facility: CLINIC | Age: 27
End: 2022-02-15
Payer: COMMERCIAL

## 2022-02-15 NOTE — TELEPHONE ENCOUNTER
Reason for Call:  Other appointment    Detailed comments: Patient is needing a pregnancy confirmation and 1st ob appointment scheduled.  Scheduling is not able to schedule this type of appointment.    Phone Number Patient can be reached at: Cell number on file:    Telephone Information:   Mobile [phone]       Best Time: any    Can we leave a detailed message on this number? YES    Call taken on 2/15/2022 at 9:50 AM by Maribell Davidson

## 2022-02-15 NOTE — TELEPHONE ENCOUNTER
There should be a message in the chart about this.  I have already seen her for a pregnancy confirmation, she needs a first OB visit.  Anytime in the next 2 weeks or so is fine.  She is a [facility] patient.

## 2022-02-16 DIAGNOSIS — E03.9 ACQUIRED HYPOTHYROIDISM: Primary | ICD-10-CM

## 2022-02-28 ENCOUNTER — LAB (OUTPATIENT)
Dept: LAB | Facility: CLINIC | Age: 27
End: 2022-02-28
Payer: COMMERCIAL

## 2022-02-28 DIAGNOSIS — E03.9 ACQUIRED HYPOTHYROIDISM: ICD-10-CM

## 2022-02-28 LAB — TSH SERPL DL<=0.005 MIU/L-ACNC: 2.38 UIU/ML (ref 0.3–5)

## 2022-02-28 PROCEDURE — 36415 COLL VENOUS BLD VENIPUNCTURE: CPT

## 2022-02-28 PROCEDURE — 84443 ASSAY THYROID STIM HORMONE: CPT

## 2022-03-08 ENCOUNTER — PRENATAL OFFICE VISIT (OUTPATIENT)
Dept: FAMILY MEDICINE | Facility: CLINIC | Age: 27
End: 2022-03-08
Payer: COMMERCIAL

## 2022-03-08 VITALS
HEIGHT: 59 IN | SYSTOLIC BLOOD PRESSURE: 118 MMHG | WEIGHT: 144.2 LBS | HEART RATE: 87 BPM | BODY MASS INDEX: 29.07 KG/M2 | DIASTOLIC BLOOD PRESSURE: 65 MMHG | RESPIRATION RATE: 16 BRPM

## 2022-03-08 DIAGNOSIS — Z86.32 HISTORY OF GESTATIONAL DIABETES MELLITUS: ICD-10-CM

## 2022-03-08 DIAGNOSIS — Z12.4 CERVICAL CANCER SCREENING: ICD-10-CM

## 2022-03-08 DIAGNOSIS — E03.9 ACQUIRED HYPOTHYROIDISM: ICD-10-CM

## 2022-03-08 DIAGNOSIS — Z34.82 ENCOUNTER FOR SUPERVISION OF OTHER NORMAL PREGNANCY IN SECOND TRIMESTER: Primary | ICD-10-CM

## 2022-03-08 DIAGNOSIS — E55.9 VITAMIN D DEFICIENCY: ICD-10-CM

## 2022-03-08 DIAGNOSIS — Z11.52 ENCOUNTER FOR SCREENING FOR COVID-19: ICD-10-CM

## 2022-03-08 PROBLEM — Z34.90 SUPERVISION OF NORMAL PREGNANCY: Status: ACTIVE | Noted: 2022-03-08

## 2022-03-08 PROBLEM — Z34.90 PREGNANCY WITH FETUS OF UNKNOWN GESTATIONAL AGE: Status: RESOLVED | Noted: 2022-01-30 | Resolved: 2022-03-08

## 2022-03-08 PROBLEM — O46.8X9 SUBCHORIONIC HEMORRHAGE: Status: ACTIVE | Noted: 2022-03-08

## 2022-03-08 LAB
ABO/RH(D): NORMAL
ALBUMIN UR-MCNC: NEGATIVE MG/DL
ANTIBODY SCREEN: NEGATIVE
APPEARANCE UR: CLEAR
BACTERIA #/AREA URNS HPF: ABNORMAL /HPF
BILIRUB UR QL STRIP: NEGATIVE
CLUE CELLS: ABNORMAL
COLOR UR AUTO: YELLOW
ERYTHROCYTE [DISTWIDTH] IN BLOOD BY AUTOMATED COUNT: 12.6 % (ref 10–15)
GLUCOSE UR STRIP-MCNC: >=1000 MG/DL
HCT VFR BLD AUTO: 36.6 % (ref 35–47)
HGB BLD-MCNC: 12.2 G/DL (ref 11.7–15.7)
HGB UR QL STRIP: NEGATIVE
HIV 1+2 AB+HIV1 P24 AG SERPL QL IA: NEGATIVE
KETONES UR STRIP-MCNC: NEGATIVE MG/DL
LEUKOCYTE ESTERASE UR QL STRIP: ABNORMAL
MCH RBC QN AUTO: 28.2 PG (ref 26.5–33)
MCHC RBC AUTO-ENTMCNC: 33.3 G/DL (ref 31.5–36.5)
MCV RBC AUTO: 85 FL (ref 78–100)
NITRATE UR QL: NEGATIVE
PH UR STRIP: 7 [PH] (ref 5–8)
PLATELET # BLD AUTO: 301 10E3/UL (ref 150–450)
RBC # BLD AUTO: 4.32 10E6/UL (ref 3.8–5.2)
RBC #/AREA URNS AUTO: ABNORMAL /HPF
SP GR UR STRIP: 1.02 (ref 1–1.03)
SPECIMEN EXPIRATION DATE: NORMAL
SQUAMOUS #/AREA URNS AUTO: ABNORMAL /LPF
TRICHOMONAS, WET PREP: ABNORMAL
UROBILINOGEN UR STRIP-ACNC: 0.2 E.U./DL
WBC # BLD AUTO: 15.2 10E3/UL (ref 4–11)
WBC #/AREA URNS AUTO: ABNORMAL /HPF
WBC'S/HIGH POWER FIELD, WET PREP: ABNORMAL
YEAST, WET PREP: ABNORMAL

## 2022-03-08 PROCEDURE — 99214 OFFICE O/P EST MOD 30 MIN: CPT | Performed by: FAMILY MEDICINE

## 2022-03-08 PROCEDURE — G0123 SCREEN CERV/VAG THIN LAYER: HCPCS | Performed by: FAMILY MEDICINE

## 2022-03-08 PROCEDURE — 81001 URINALYSIS AUTO W/SCOPE: CPT | Performed by: FAMILY MEDICINE

## 2022-03-08 PROCEDURE — 87340 HEPATITIS B SURFACE AG IA: CPT | Performed by: FAMILY MEDICINE

## 2022-03-08 PROCEDURE — 86900 BLOOD TYPING SEROLOGIC ABO: CPT | Performed by: FAMILY MEDICINE

## 2022-03-08 PROCEDURE — 87491 CHLMYD TRACH DNA AMP PROBE: CPT | Performed by: FAMILY MEDICINE

## 2022-03-08 PROCEDURE — 87591 N.GONORRHOEAE DNA AMP PROB: CPT | Performed by: FAMILY MEDICINE

## 2022-03-08 PROCEDURE — 86850 RBC ANTIBODY SCREEN: CPT | Performed by: FAMILY MEDICINE

## 2022-03-08 PROCEDURE — 36415 COLL VENOUS BLD VENIPUNCTURE: CPT | Performed by: FAMILY MEDICINE

## 2022-03-08 PROCEDURE — 85027 COMPLETE CBC AUTOMATED: CPT | Performed by: FAMILY MEDICINE

## 2022-03-08 PROCEDURE — 86901 BLOOD TYPING SEROLOGIC RH(D): CPT | Performed by: FAMILY MEDICINE

## 2022-03-08 PROCEDURE — 83036 HEMOGLOBIN GLYCOSYLATED A1C: CPT | Performed by: FAMILY MEDICINE

## 2022-03-08 PROCEDURE — 82306 VITAMIN D 25 HYDROXY: CPT | Performed by: FAMILY MEDICINE

## 2022-03-08 PROCEDURE — 99207 PR FIRST OB VISIT: CPT | Performed by: FAMILY MEDICINE

## 2022-03-08 PROCEDURE — 87210 SMEAR WET MOUNT SALINE/INK: CPT | Performed by: FAMILY MEDICINE

## 2022-03-08 PROCEDURE — 86762 RUBELLA ANTIBODY: CPT | Performed by: FAMILY MEDICINE

## 2022-03-08 PROCEDURE — 86780 TREPONEMA PALLIDUM: CPT | Performed by: FAMILY MEDICINE

## 2022-03-08 PROCEDURE — 87086 URINE CULTURE/COLONY COUNT: CPT | Performed by: FAMILY MEDICINE

## 2022-03-08 PROCEDURE — 87389 HIV-1 AG W/HIV-1&-2 AB AG IA: CPT | Performed by: FAMILY MEDICINE

## 2022-03-08 RX ORDER — LEVOTHYROXINE SODIUM 100 UG/1
100 TABLET ORAL DAILY
Qty: 90 TABLET | Refills: 3 | Status: SHIPPED | OUTPATIENT
Start: 2022-03-08 | End: 2022-07-07

## 2022-03-08 NOTE — LETTER
Juan J Burgess  1995      3-8-22      To whom it may concern:      This patient is pregnant and should be medically exempt from the Covid shots at this time.  Please feel free to contact me with any questions or concerns.        Sincerely,         Margo Courtney MD

## 2022-03-08 NOTE — PATIENT INSTRUCTIONS
I ordered Covid swabs for you and your son.  You would need to set a lab appointment at either our clinic or the [facility] on Monday, March 21.  If you choose to do it at walk-in care at [facility] does walk-in on Sunday, March 20 and let them know there are orders in.    Today I will write a letter of exception for the Covid vaccine for travel.    I am ordering an ultrasound to be done within the next week or so because of the small implantation bleed.  You can call [phone] to set that up at [facility] or [facility].    If you are able please have the 20-week full fetal survey ultrasound in the [facility].    Also since you had a history of gestational diabetes I would recommend a 1 hour sugar if you can in the [facility] around 16 weeks pregnant.  If they cannot do that then at least an A1c.  We will still repeat it when you get back.    If you do test positive for gestational diabetes while you are in the [facility] I would recommend seeing diabetic educator starting to check blood sugars and possible insulin.    Right now we will check an A1c and recommend a lower carb diet.    We will help you set an appointment for when you get back.    That visit will be the 1 hour glucose test.  You do not need to fast for that but please do not have a heavy carb meal before drinking the 1 hour sugar at our clinic.    Declined genetic testing today.    Declined flu shot and Covid shots today.    Recommend a prenatal vitamin daily.  Please check that it has iron, DHA and folic acid.    Thyroid function should be done monthly in pregnancy.  We want to try to keep the TSH between 1 and 3.

## 2022-03-09 LAB
C TRACH DNA SPEC QL NAA+PROBE: NEGATIVE
DEPRECATED CALCIDIOL+CALCIFEROL SERPL-MC: 29 UG/L (ref 30–80)
HBA1C MFR BLD: 5.1 %
HBV SURFACE AG SERPL QL IA: NONREACTIVE
N GONORRHOEA DNA SPEC QL NAA+PROBE: NEGATIVE
RUBV IGG SERPL QL IA: 11.4 INDEX
RUBV IGG SERPL QL IA: POSITIVE
T PALLIDUM AB SER QL: NONREACTIVE

## 2022-03-09 NOTE — PROGRESS NOTES
SUBJECTIVE:     HPI:    This is a 26 year old female patient,  who presents for her first obstetrical visit.    SIMONE: 2022, by Ultrasound.  She is 11w4d weeks.  Her cycles are regular.  Her last menstrual period was normal.   Since her LMP, she has had no complaints).   She denies vaginal bleeding.    Additional History: She did have a vaginal delivery 4 years ago.  This was a term pregnancy.  She was induced for nonreactive NST.  She did have gestational diabetes, diet controlled.  She is planning on traveling to the [facility] either  or  and returning .  She would like to go over the care that she needs there.  She does have access to good medical care there.  Recent ultrasound showed a due date of 2022.  No history of anemia or gestational hypertension.  She and her  have not traveled to an area with Zika virus.  She does not smoke, drink or use illicit drugs.  She needs a Covid swab within 48 hours of going to the [facility].  She is not wishing to do the Covid vaccine as she is fearful of harm to her fetus.  She would like a letter of exemption.    Have you travelled during the pregnancy?No  Have your sexual partner(s) travelled during the pregnancy?No      HISTORY:   Planned Pregnancy: Yes  Marital Status:   Occupation: Unknown  Living in Household: Spouse and Children    Past History:  Her past medical history Gestational diabetes.      She has a history of  gestational diabetes, diet controlled    Since her last LMP she denies use of alcohol, tobacco and street drugs.    Past medical, surgical, social and family history were reviewed and updated in [employer].        Current Outpatient Medications   Medication     levothyroxine (SYNTHROID/LEVOTHROID) 100 MCG tablet     prenatal vitamin iron-folic acid 27mg-0.8mg (PRENATAL S) 27 mg iron- 800 mcg Tab tablet     No current facility-administered medications for this visit.       ROS:   12 point review of systems  "negative other than symptoms noted below or in the HPI.      OBJECTIVE:     EXAM:  /65   Pulse 87   Resp 16   Ht 1.499 m (4' 11\")   Wt 65.4 kg (144 lb 3.2 oz)   LMP 12/18/2021 (Within Days)   BMI 29.12 kg/m   Body mass index is 29.12 kg/m .    GENERAL: healthy, alert and no distress  EYES: Eyes grossly normal to inspection, PERRL and conjunctivae and sclerae normal  HENT: ear canals and TM's normal, nose and mouth without ulcers or lesions  NECK: no adenopathy, no asymmetry, masses, or scars and thyroid normal to palpation  RESP: lungs clear to auscultation - no rales, rhonchi or wheezes  BREAST: normal without masses, tenderness or nipple discharge and no palpable axillary masses or adenopathy  CV: regular rate and rhythm, normal S1 S2, no S3 or S4, no murmur, click or rub, no peripheral edema and peripheral pulses strong  ABDOMEN: soft, nontender, no hepatosplenomegaly, no masses and bowel sounds normal  MS: no gross musculoskeletal defects noted, no edema  SKIN: no suspicious lesions or rashes  NEURO: Normal strength and tone, mentation intact and speech normal  PSYCH: mentation appears normal, affect normal/bright    ASSESSMENT/PLAN:       ICD-10-CM    1. Encounter for supervision of other normal pregnancy in second trimester  Z34.82 ABO/Rh type and screen     Hepatitis B surface antigen     CBC with platelets     HIV Antigen Antibody Combo     Rubella Antibody IgG     Treponema Abs w Reflex to RPR and Titer     Urine Culture Aerobic Bacterial     Neisseria gonorrhoeae PCR     *UA reflex to Microscopic     Chlamydia trachomatis PCR     Urine Culture Aerobic Bacterial     *UA reflex to Microscopic     Urine Microscopic Exam     ABO/Rh type and screen     Hepatitis B surface antigen     CBC with platelets     HIV Antigen Antibody Combo     Rubella Antibody IgG     Treponema Abs w Reflex to RPR and Titer     Neisseria gonorrhoeae PCR     Chlamydia trachomatis PCR     Wet prep - lab collect     Wet prep " - lab collect   2. Cervical cancer screening  Z12.4 Pap Screen reflex to HPV if ASCUS - recommend age 25 - 29     Pap Screen reflex to HPV if ASCUS - recommend age 25 - 29   3. Subchorionic hemorrhage of placenta in first trimester, single or unspecified fetus  O41.8X10 US OB < 14 Weeks Single    O46.8X1    4. Acquired hypothyroidism  E03.9 levothyroxine (SYNTHROID/LEVOTHROID) 100 MCG tablet   5. History of gestational diabetes mellitus  Z86.32 Hemoglobin A1c     Hemoglobin A1c   6. Vitamin D deficiency  E55.9 Vitamin D Deficiency     Vitamin D Deficiency   7. Encounter for screening for COVID-19  Z11.52 Asymptomatic COVID-19 Virus (Coronavirus) by PCR       26 year old , 11w4d weeks of pregnancy with SIMONE of 2022, by Ultrasound    Discussed as follows:    Counseling given:   - Follow up in 4-6 weeks for return OB visit.  - Recommended weight gain for pregnancy: 15-25 lbs.         PLAN/PATIENT INSTRUCTIONS:    I ordered Covid swabs for you and your son.  You would need to set a lab appointment at either our clinic or the [facility] on Monday, .  If you choose to do it at walk-in care at [facility] does walk-in on  and let them know there are orders in.    Today I will write a letter of exception for the Covid vaccine for travel.    I am ordering an ultrasound to be done within the next week or so because of the small implantation bleed.  You can call [phone] to set that up at [facility] or [facility].    If you are able please have the 20-week full fetal survey ultrasound in the [facility].    Also since you had a history of gestational diabetes I would recommend a 1 hour sugar if you can in the [facility] around 16 weeks pregnant.  If they cannot do that then at least an A1c.  We will still repeat it when you get back.    If you do test positive for gestational diabetes while you are in the [facility] I would recommend seeing diabetic educator starting to check blood  sugars and possible insulin.    Right now we will check an A1c and recommend a lower carb diet.    We will help you set an appointment for when you get back.    That visit will be the 1 hour glucose test.  You do not need to fast for that but please do not have a heavy carb meal before drinking the 1 hour sugar at our clinic.    Declined genetic testing today.    Declined flu shot and Covid shots today.    Recommend a prenatal vitamin daily.  Please check that it has iron, DHA and folic acid.    Thyroid function should be done monthly in pregnancy.  We want to try to keep the TSH between 1 and 3.     She would like to deliver at [facility].       Margo Courtney MD

## 2022-03-10 LAB
BACTERIA UR CULT: NORMAL
BKR LAB AP GYN ADEQUACY: NORMAL
BKR LAB AP GYN INTERPRETATION: NORMAL
BKR LAB AP HPV REFLEX: NORMAL
BKR LAB AP PREVIOUS ABNORMAL: NORMAL
PATH REPORT.COMMENTS IMP SPEC: NORMAL
PATH REPORT.COMMENTS IMP SPEC: NORMAL
PATH REPORT.RELEVANT HX SPEC: NORMAL

## 2022-03-17 ENCOUNTER — HOSPITAL ENCOUNTER (OUTPATIENT)
Dept: ULTRASOUND IMAGING | Facility: HOSPITAL | Age: 27
Discharge: HOME | End: 2022-03-17
Attending: FAMILY MEDICINE | Admitting: FAMILY MEDICINE
Payer: COMMERCIAL

## 2022-03-17 PROCEDURE — 76801 OB US < 14 WKS SINGLE FETUS: CPT

## 2022-03-22 ENCOUNTER — LAB (OUTPATIENT)
Dept: LAB | Facility: CLINIC | Age: 27
End: 2022-03-22
Attending: FAMILY MEDICINE
Payer: COMMERCIAL

## 2022-03-22 DIAGNOSIS — Z11.52 ENCOUNTER FOR SCREENING FOR COVID-19: ICD-10-CM

## 2022-03-22 LAB — SARS-COV-2 RNA RESP QL NAA+PROBE: NEGATIVE

## 2022-03-22 PROCEDURE — U0005 INFEC AGEN DETEC AMPLI PROBE: HCPCS

## 2022-03-22 PROCEDURE — U0003 INFECTIOUS AGENT DETECTION BY NUCLEIC ACID (DNA OR RNA); SEVERE ACUTE RESPIRATORY SYNDROME CORONAVIRUS 2 (SARS-COV-2) (CORONAVIRUS DISEASE [COVID-19]), AMPLIFIED PROBE TECHNIQUE, MAKING USE OF HIGH THROUGHPUT TECHNOLOGIES AS DESCRIBED BY CMS-2020-01-R: HCPCS

## 2022-03-28 ENCOUNTER — MYC MEDICAL ADVICE (OUTPATIENT)
Dept: FAMILY MEDICINE | Facility: CLINIC | Age: 27
End: 2022-03-28
Payer: COMMERCIAL

## 2022-05-12 ENCOUNTER — NURSE TRIAGE (OUTPATIENT)
Dept: NURSING | Facility: CLINIC | Age: 27
End: 2022-05-12
Payer: COMMERCIAL

## 2022-05-13 NOTE — TELEPHONE ENCOUNTER
Ok to take guaifenesin and dextromethorphan (2 main ingredients in robutussin DM) please avoid product with alcohol in it, so check label

## 2022-05-13 NOTE — TELEPHONE ENCOUNTER
TELEPHONE CALL -    Reason for call-  Prescription in pregancy   Rohan   ([facility] on file)    Wife is 20 weeks 6 days, she is currently in the [facility]   She wants to know if she can take Robitussin DM for the cough   Pt has written a note to PCP on [employer] - but her Internet connection is spotty and  is calling on her behave.     is wondering what is the PCP advised in this case - He would like advised Via [employer] and also a call back to his phone #  [phone]    PCP/ Clinic: Margo Courtney MD  [facility]    Informed caller that RN will send a note to PCP/Provider s Care Team Eliane Casas RN [place] Nurse Advisor,  9:01 PM 5/12/2022

## 2022-07-01 ENCOUNTER — MYC MEDICAL ADVICE (OUTPATIENT)
Dept: FAMILY MEDICINE | Facility: CLINIC | Age: 27
End: 2022-07-01

## 2022-07-01 DIAGNOSIS — Z34.82 ENCOUNTER FOR SUPERVISION OF OTHER NORMAL PREGNANCY IN SECOND TRIMESTER: Primary | ICD-10-CM

## 2022-07-05 ENCOUNTER — HOSPITAL ENCOUNTER (OUTPATIENT)
Dept: ULTRASOUND IMAGING | Facility: HOSPITAL | Age: 27
Discharge: HOME | End: 2022-07-05
Attending: FAMILY MEDICINE | Admitting: FAMILY MEDICINE
Payer: COMMERCIAL

## 2022-07-05 DIAGNOSIS — Z34.82 ENCOUNTER FOR SUPERVISION OF OTHER NORMAL PREGNANCY IN SECOND TRIMESTER: ICD-10-CM

## 2022-07-05 PROCEDURE — 76805 OB US >/= 14 WKS SNGL FETUS: CPT

## 2022-07-06 ENCOUNTER — PRENATAL OFFICE VISIT (OUTPATIENT)
Dept: FAMILY MEDICINE | Facility: CLINIC | Age: 27
End: 2022-07-06
Payer: COMMERCIAL

## 2022-07-06 VITALS
DIASTOLIC BLOOD PRESSURE: 59 MMHG | WEIGHT: 147.4 LBS | RESPIRATION RATE: 16 BRPM | HEART RATE: 79 BPM | BODY MASS INDEX: 29.77 KG/M2 | SYSTOLIC BLOOD PRESSURE: 106 MMHG

## 2022-07-06 DIAGNOSIS — E55.9 VITAMIN D DEFICIENCY: ICD-10-CM

## 2022-07-06 DIAGNOSIS — O09.92 SUPERVISION OF HIGH RISK PREGNANCY IN SECOND TRIMESTER: Primary | ICD-10-CM

## 2022-07-06 DIAGNOSIS — Z86.32 HISTORY OF GESTATIONAL DIABETES MELLITUS: ICD-10-CM

## 2022-07-06 DIAGNOSIS — E03.9 ACQUIRED HYPOTHYROIDISM: ICD-10-CM

## 2022-07-06 PROBLEM — Z34.90 SUPERVISION OF NORMAL PREGNANCY: Status: RESOLVED | Noted: 2022-03-08 | Resolved: 2022-07-06

## 2022-07-06 LAB
ALBUMIN UR-MCNC: NEGATIVE MG/DL
APPEARANCE UR: CLEAR
BILIRUB UR QL STRIP: NEGATIVE
COLOR UR AUTO: YELLOW
GLUCOSE 1H P 50 G GLC PO SERPL-MCNC: 100 MG/DL (ref 70–129)
GLUCOSE UR STRIP-MCNC: NEGATIVE MG/DL
HBA1C MFR BLD: 5.1 % (ref 0–5.6)
HGB BLD-MCNC: 11.2 G/DL (ref 11.7–15.7)
HGB UR QL STRIP: NEGATIVE
KETONES UR STRIP-MCNC: NEGATIVE MG/DL
LEUKOCYTE ESTERASE UR QL STRIP: NEGATIVE
NITRATE UR QL: NEGATIVE
PH UR STRIP: 7 [PH] (ref 5–8)
SP GR UR STRIP: 1.01 (ref 1–1.03)
T4 FREE SERPL-MCNC: 0.74 NG/DL (ref 0.9–1.7)
TSH SERPL DL<=0.005 MIU/L-ACNC: 16 UIU/ML (ref 0.3–4.2)
UROBILINOGEN UR STRIP-ACNC: 0.2 E.U./DL

## 2022-07-06 PROCEDURE — 84443 ASSAY THYROID STIM HORMONE: CPT | Performed by: FAMILY MEDICINE

## 2022-07-06 PROCEDURE — 83036 HEMOGLOBIN GLYCOSYLATED A1C: CPT | Performed by: FAMILY MEDICINE

## 2022-07-06 PROCEDURE — 84439 ASSAY OF FREE THYROXINE: CPT | Performed by: FAMILY MEDICINE

## 2022-07-06 PROCEDURE — 86780 TREPONEMA PALLIDUM: CPT | Performed by: FAMILY MEDICINE

## 2022-07-06 PROCEDURE — 81003 URINALYSIS AUTO W/O SCOPE: CPT | Performed by: FAMILY MEDICINE

## 2022-07-06 PROCEDURE — 85018 HEMOGLOBIN: CPT | Performed by: FAMILY MEDICINE

## 2022-07-06 PROCEDURE — 36415 COLL VENOUS BLD VENIPUNCTURE: CPT | Performed by: FAMILY MEDICINE

## 2022-07-06 PROCEDURE — 99207 PR PRENATAL VISIT: CPT | Performed by: FAMILY MEDICINE

## 2022-07-06 PROCEDURE — 82306 VITAMIN D 25 HYDROXY: CPT | Performed by: FAMILY MEDICINE

## 2022-07-06 PROCEDURE — 82950 GLUCOSE TEST: CPT | Performed by: FAMILY MEDICINE

## 2022-07-06 NOTE — PATIENT INSTRUCTIONS
Ordered OB ultrasound to follow-up on not seeing the ventricles (part of brain) on last ultrasound, to be done in about 4 weeks.  Can call [phone] to set up.    Follow-up in 2 weeks.    We will help set future OB appointments.    Labs today.

## 2022-07-07 DIAGNOSIS — E03.9 ACQUIRED HYPOTHYROIDISM: Primary | ICD-10-CM

## 2022-07-07 LAB
DEPRECATED CALCIDIOL+CALCIFEROL SERPL-MC: 42 UG/L (ref 20–75)
T PALLIDUM AB SER QL: NONREACTIVE

## 2022-07-07 RX ORDER — LEVOTHYROXINE SODIUM 125 UG/1
125 TABLET ORAL DAILY
Qty: 30 TABLET | Refills: 1 | Status: SHIPPED | OUTPATIENT
Start: 2022-07-07 | End: 2022-08-05

## 2022-07-20 ENCOUNTER — PRENATAL OFFICE VISIT (OUTPATIENT)
Dept: FAMILY MEDICINE | Facility: CLINIC | Age: 27
End: 2022-07-20
Payer: COMMERCIAL

## 2022-07-20 VITALS
TEMPERATURE: 98.1 F | HEART RATE: 88 BPM | DIASTOLIC BLOOD PRESSURE: 60 MMHG | WEIGHT: 148 LBS | RESPIRATION RATE: 16 BRPM | SYSTOLIC BLOOD PRESSURE: 103 MMHG | BODY MASS INDEX: 29.89 KG/M2

## 2022-07-20 DIAGNOSIS — Z34.83 ENCOUNTER FOR SUPERVISION OF OTHER NORMAL PREGNANCY IN THIRD TRIMESTER: Primary | ICD-10-CM

## 2022-07-20 LAB
ALBUMIN UR-MCNC: NEGATIVE MG/DL
APPEARANCE UR: CLEAR
BILIRUB UR QL STRIP: NEGATIVE
COLOR UR AUTO: YELLOW
GLUCOSE UR STRIP-MCNC: NEGATIVE MG/DL
HGB UR QL STRIP: NEGATIVE
KETONES UR STRIP-MCNC: NEGATIVE MG/DL
LEUKOCYTE ESTERASE UR QL STRIP: ABNORMAL
NITRATE UR QL: NEGATIVE
PH UR STRIP: 7.5 [PH] (ref 5–8)
SP GR UR STRIP: 1.02 (ref 1–1.03)
UROBILINOGEN UR STRIP-ACNC: 0.2 E.U./DL

## 2022-07-20 PROCEDURE — 90471 IMMUNIZATION ADMIN: CPT | Performed by: FAMILY MEDICINE

## 2022-07-20 PROCEDURE — 81003 URINALYSIS AUTO W/O SCOPE: CPT | Performed by: FAMILY MEDICINE

## 2022-07-20 PROCEDURE — 99207 PR PRENATAL VISIT: CPT | Performed by: FAMILY MEDICINE

## 2022-07-20 PROCEDURE — 90715 TDAP VACCINE 7 YRS/> IM: CPT | Performed by: FAMILY MEDICINE

## 2022-07-20 NOTE — PATIENT INSTRUCTIONS
Tdap shot today to give baby some antibodies for whooping cough.    Next visit in 2 weeks.    Next visit we will check thyroid function and hemoglobin.    I will reorder the ultrasound and please set that up within 2 weeks before your next appointment to recheck the baby's brain.    Starting at 34 weeks we will start cervical checks.

## 2022-07-20 NOTE — PROGRESS NOTES
S: Good fetal movement.  No regular contractions.  No lower extremity edema.  She does complain of some left-sided pubic discomfort with changing positions such as sitting to standing.  It just last for a few seconds and when she stretches her leg it will go away.  No vaginal bleeding.  No prolonged pubic or any uterine pain.  Last visit she had normal A1c, negative syphilis, hemoglobin 11.2.  TSH was elevated so levothyroxine was increased.    O: Deep tendon reflexes 2+, urinalysis trace leukocytes otherwise normal, see flowsheet    A/P: 27-year-old  2 para 1-0-0-1 female at 30-5/7 weeks gestation.  O+ blood type.  Mild anemia.  Tdap shot today to give baby some antibodies for whooping cough.  Next visit in 2 weeks.  Next visit we will check thyroid function and hemoglobin.  I will reorder the ultrasound and please set that up within 2 weeks before your next appointment to recheck the baby's brain.  Starting at 34 weeks we will start cervical checks.

## 2022-07-21 ENCOUNTER — TELEPHONE (OUTPATIENT)
Dept: FAMILY MEDICINE | Facility: CLINIC | Age: 27
End: 2022-07-21

## 2022-07-23 ENCOUNTER — HEALTH MAINTENANCE LETTER (OUTPATIENT)
Age: 27
End: 2022-07-23

## 2022-08-01 ENCOUNTER — HOSPITAL ENCOUNTER (OUTPATIENT)
Dept: ULTRASOUND IMAGING | Facility: HOSPITAL | Age: 27
Discharge: HOME | End: 2022-08-01
Attending: FAMILY MEDICINE | Admitting: FAMILY MEDICINE
Payer: COMMERCIAL

## 2022-08-01 DIAGNOSIS — Z34.83 ENCOUNTER FOR SUPERVISION OF OTHER NORMAL PREGNANCY IN THIRD TRIMESTER: ICD-10-CM

## 2022-08-01 PROCEDURE — 76816 OB US FOLLOW-UP PER FETUS: CPT

## 2022-08-04 ENCOUNTER — PRENATAL OFFICE VISIT (OUTPATIENT)
Dept: FAMILY MEDICINE | Facility: CLINIC | Age: 27
End: 2022-08-04
Payer: COMMERCIAL

## 2022-08-04 VITALS
RESPIRATION RATE: 16 BRPM | SYSTOLIC BLOOD PRESSURE: 108 MMHG | BODY MASS INDEX: 30.4 KG/M2 | WEIGHT: 150.5 LBS | DIASTOLIC BLOOD PRESSURE: 61 MMHG | HEART RATE: 91 BPM | TEMPERATURE: 97.4 F

## 2022-08-04 DIAGNOSIS — E03.9 ACQUIRED HYPOTHYROIDISM: ICD-10-CM

## 2022-08-04 DIAGNOSIS — Z34.83 ENCOUNTER FOR SUPERVISION OF OTHER NORMAL PREGNANCY IN THIRD TRIMESTER: Primary | ICD-10-CM

## 2022-08-04 LAB
ALBUMIN UR-MCNC: ABNORMAL MG/DL
APPEARANCE UR: CLEAR
BILIRUB UR QL STRIP: NEGATIVE
COLOR UR AUTO: YELLOW
GLUCOSE UR STRIP-MCNC: NEGATIVE MG/DL
HGB BLD-MCNC: 10.9 G/DL (ref 11.7–15.7)
HGB UR QL STRIP: NEGATIVE
KETONES UR STRIP-MCNC: NEGATIVE MG/DL
LEUKOCYTE ESTERASE UR QL STRIP: ABNORMAL
NITRATE UR QL: NEGATIVE
PH UR STRIP: 7 [PH] (ref 5–8)
SP GR UR STRIP: 1.02 (ref 1–1.03)
TSH SERPL DL<=0.005 MIU/L-ACNC: 5.94 UIU/ML (ref 0.3–4.2)
UROBILINOGEN UR STRIP-ACNC: 0.2 E.U./DL

## 2022-08-04 PROCEDURE — 84443 ASSAY THYROID STIM HORMONE: CPT | Performed by: FAMILY MEDICINE

## 2022-08-04 PROCEDURE — 99207 PR PRENATAL VISIT: CPT | Performed by: FAMILY MEDICINE

## 2022-08-04 PROCEDURE — 81003 URINALYSIS AUTO W/O SCOPE: CPT | Performed by: FAMILY MEDICINE

## 2022-08-04 PROCEDURE — 36415 COLL VENOUS BLD VENIPUNCTURE: CPT | Performed by: FAMILY MEDICINE

## 2022-08-04 PROCEDURE — 84439 ASSAY OF FREE THYROXINE: CPT | Performed by: FAMILY MEDICINE

## 2022-08-04 PROCEDURE — 85018 HEMOGLOBIN: CPT | Performed by: FAMILY MEDICINE

## 2022-08-04 NOTE — PROGRESS NOTES
S:  Still with ramdom left pubic pain with changing position.  Stable.  No vaginal bleeding.  Would like to be induced if over due.    O:  DTR 2+, UA- Tr protein, TR LE, see flowsheet    A/P: 27-year-old  2 para 1-0-0-1 female at 32-6/7 weeks gestation.  O+ blood type.  Hypothyroidism.  If you are having 3 or more contractions or squeezes in an hour please call and present to labor and delivery for monitoring.  [facility] labor and delivery at .  Urinalysis each visit.  Next visit in 2 weeks then weekly.  Today we will check a thyroid function and hemoglobin.  I will contact radiology to see if they can comment on the fetal brain and I will contact you within a week via [employer].  [email or web address]     Use this to preregister with [facility].   If the cervix is ready we can induce anytime after 39 weeks.  If the cervix is not ready we cannot induce until 41 weeks.

## 2022-08-04 NOTE — PATIENT INSTRUCTIONS
If you are having 3 or more contractions or squeezes in an hour please call and present to labor and delivery for monitoring.  [facility] labor and delivery at .    Urinalysis each visit.    Next visit in 2 weeks then weekly.    Today we will check a thyroid function and hemoglobin.    I will contact radiology to see if they can comment on the fetal brain and I will contact you within a week via [employer].    [email or web address]     Use this to preregister with [facility].     If the cervix is ready we can induce anytime after 39 weeks.  If the cervix is not ready we cannot induce until 41 weeks.

## 2022-08-05 DIAGNOSIS — E03.9 ACQUIRED HYPOTHYROIDISM: Primary | ICD-10-CM

## 2022-08-05 LAB — T4 FREE SERPL-MCNC: 0.83 NG/DL (ref 0.9–1.7)

## 2022-08-05 RX ORDER — LEVOTHYROXINE SODIUM 137 UG/1
137 TABLET ORAL DAILY
Qty: 30 TABLET | Refills: 3 | Status: SHIPPED | OUTPATIENT
Start: 2022-08-05 | End: 2022-08-18

## 2022-08-08 DIAGNOSIS — O09.92 SUPERVISION OF HIGH RISK PREGNANCY IN SECOND TRIMESTER: Primary | ICD-10-CM

## 2022-08-12 ENCOUNTER — HOSPITAL ENCOUNTER (OUTPATIENT)
Dept: ULTRASOUND IMAGING | Facility: HOSPITAL | Age: 27
Discharge: HOME | End: 2022-08-12
Attending: FAMILY MEDICINE | Admitting: FAMILY MEDICINE
Payer: COMMERCIAL

## 2022-08-12 DIAGNOSIS — O09.92 SUPERVISION OF HIGH RISK PREGNANCY IN SECOND TRIMESTER: ICD-10-CM

## 2022-08-12 PROCEDURE — 76815 OB US LIMITED FETUS(S): CPT

## 2022-08-17 ENCOUNTER — PRENATAL OFFICE VISIT (OUTPATIENT)
Dept: FAMILY MEDICINE | Facility: CLINIC | Age: 27
End: 2022-08-17
Payer: COMMERCIAL

## 2022-08-17 VITALS
BODY MASS INDEX: 30.52 KG/M2 | WEIGHT: 151.13 LBS | DIASTOLIC BLOOD PRESSURE: 60 MMHG | RESPIRATION RATE: 16 BRPM | SYSTOLIC BLOOD PRESSURE: 111 MMHG | HEART RATE: 75 BPM | TEMPERATURE: 97.7 F

## 2022-08-17 DIAGNOSIS — E03.9 ACQUIRED HYPOTHYROIDISM: ICD-10-CM

## 2022-08-17 DIAGNOSIS — D64.9 ANEMIA, UNSPECIFIED TYPE: Primary | ICD-10-CM

## 2022-08-17 LAB
ALBUMIN UR-MCNC: NEGATIVE MG/DL
APPEARANCE UR: CLEAR
BILIRUB UR QL STRIP: NEGATIVE
COLOR UR AUTO: YELLOW
GLUCOSE UR STRIP-MCNC: NEGATIVE MG/DL
HGB BLD-MCNC: 10.9 G/DL (ref 11.7–15.7)
HGB UR QL STRIP: NEGATIVE
KETONES UR STRIP-MCNC: NEGATIVE MG/DL
LEUKOCYTE ESTERASE UR QL STRIP: ABNORMAL
NITRATE UR QL: NEGATIVE
PH UR STRIP: 7 [PH] (ref 5–8)
SP GR UR STRIP: 1.01 (ref 1–1.03)
TSH SERPL DL<=0.005 MIU/L-ACNC: 9.34 UIU/ML (ref 0.3–4.2)
UROBILINOGEN UR STRIP-ACNC: 0.2 E.U./DL

## 2022-08-17 PROCEDURE — 36415 COLL VENOUS BLD VENIPUNCTURE: CPT | Performed by: FAMILY MEDICINE

## 2022-08-17 PROCEDURE — 85018 HEMOGLOBIN: CPT | Performed by: FAMILY MEDICINE

## 2022-08-17 PROCEDURE — 81003 URINALYSIS AUTO W/O SCOPE: CPT | Performed by: FAMILY MEDICINE

## 2022-08-17 PROCEDURE — 84443 ASSAY THYROID STIM HORMONE: CPT | Performed by: FAMILY MEDICINE

## 2022-08-17 PROCEDURE — 99207 PR PRENATAL VISIT: CPT | Performed by: FAMILY MEDICINE

## 2022-08-17 PROCEDURE — 84439 ASSAY OF FREE THYROXINE: CPT | Performed by: FAMILY MEDICINE

## 2022-08-17 NOTE — PATIENT INSTRUCTIONS
Checking thyroid function today.  We want to prove it is coming down from the last check.  As long as it is coming down we will not increase your med because we just increased it 2 weeks ago.  Then we will recheck your thyroid function again in 2 weeks.    We will check thyroid function and hemoglobin in 2 weeks.    Follow-up in 1 week.  We will add you on at 4:20 on Wednesday, August 24.  Please come at 4:00 for rooming.    Next visit we will do the group B strep swab.    Today we will check hemoglobin.    Continue your prenatal vitamin with iron.    [email or web address]     Use this to preregister with [facility].  Per patient it is under [facility].

## 2022-08-17 NOTE — PROGRESS NOTES
S:  No repetitive contracitons.  No vaginal bleeding.  No concerns today.    O:  DTR2+, Hgb 10.9, UA Tr LE, see flowsheet    A/P: 27-year-old  2 para 1-0-0-1 female at 34-5/7 weeks gestation.  Mild anemia.  Hypothyroid.  Checking thyroid function today.  We want to prove it is coming down from the last check.  As long as it is coming down we will not increase your med because we just increased it 2 weeks ago.  Then we will recheck your thyroid function again in 2 weeks.  We will check thyroid function and hemoglobin in 2 weeks.  Follow-up in 1 week.  We will add you on at 4:20 on Wednesday, .  Please come at 4:00 for rooming.  Next visit we will do the group B strep swab.  Today we will check hemoglobin.  Continue your prenatal vitamin with iron.  [email or web address]     Use this to preregister with [facility].  Per patient it is under [facility].

## 2022-08-18 DIAGNOSIS — E03.9 ACQUIRED HYPOTHYROIDISM: Primary | ICD-10-CM

## 2022-08-18 LAB — T4 FREE SERPL-MCNC: 0.84 NG/DL (ref 0.9–1.7)

## 2022-08-18 RX ORDER — LEVOTHYROXINE SODIUM 150 UG/1
150 TABLET ORAL DAILY
Qty: 30 TABLET | Refills: 3 | Status: SHIPPED | OUTPATIENT
Start: 2022-08-18 | End: 2022-11-17

## 2022-08-19 ENCOUNTER — HOSPITAL ENCOUNTER (OUTPATIENT)
Facility: CLINIC | Age: 27
Discharge: HOME | End: 2022-08-19
Attending: FAMILY MEDICINE | Admitting: FAMILY MEDICINE
Payer: COMMERCIAL

## 2022-08-24 ENCOUNTER — PRENATAL OFFICE VISIT (OUTPATIENT)
Dept: FAMILY MEDICINE | Facility: CLINIC | Age: 27
End: 2022-08-24
Payer: COMMERCIAL

## 2022-08-24 VITALS
BODY MASS INDEX: 30.57 KG/M2 | SYSTOLIC BLOOD PRESSURE: 108 MMHG | RESPIRATION RATE: 16 BRPM | DIASTOLIC BLOOD PRESSURE: 61 MMHG | TEMPERATURE: 97.2 F | HEART RATE: 92 BPM | WEIGHT: 151.38 LBS

## 2022-08-24 DIAGNOSIS — E06.3 HASHIMOTO'S DISEASE: Primary | ICD-10-CM

## 2022-08-24 DIAGNOSIS — Z34.83 ENCOUNTER FOR SUPERVISION OF OTHER NORMAL PREGNANCY IN THIRD TRIMESTER: ICD-10-CM

## 2022-08-24 LAB
ALBUMIN UR-MCNC: NEGATIVE MG/DL
APPEARANCE UR: CLEAR
BILIRUB UR QL STRIP: NEGATIVE
COLOR UR AUTO: YELLOW
GLUCOSE UR STRIP-MCNC: NEGATIVE MG/DL
HGB UR QL STRIP: NEGATIVE
KETONES UR STRIP-MCNC: NEGATIVE MG/DL
LEUKOCYTE ESTERASE UR QL STRIP: ABNORMAL
NITRATE UR QL: NEGATIVE
PH UR STRIP: 6.5 [PH] (ref 5–8)
SP GR UR STRIP: 1.01 (ref 1–1.03)
UROBILINOGEN UR STRIP-ACNC: 0.2 E.U./DL

## 2022-08-24 PROCEDURE — 81003 URINALYSIS AUTO W/O SCOPE: CPT | Performed by: FAMILY MEDICINE

## 2022-08-24 PROCEDURE — 87653 STREP B DNA AMP PROBE: CPT | Performed by: FAMILY MEDICINE

## 2022-08-24 PROCEDURE — 99207 PR PRENATAL VISIT: CPT | Performed by: FAMILY MEDICINE

## 2022-08-24 NOTE — PATIENT INSTRUCTIONS
Currently on levothyroxine 150 mcg.    Weekly visit.    We will recheck thyroid function and hemoglobin next visit.    After pregnancy we will continue to monitor thyroid function and if we cannot keep it at a steady range we certainly can refer to endocrinology.    Today we are checking the group B strep swab.  If that is positive then you want to get to labor and delivery early in labor to get the antibiotics.  Ideally we want that in 4 hours before delivery.    You are just mildly anemic with your last hemoglobin 10.9.  Continue your prenatal vitamin with iron and a high iron diet.

## 2022-08-24 NOTE — PROGRESS NOTES
S: Patient doing well.  Good fetal movement.  No regular contractions.  Her TSH had been under good control but lately we have had to increase her medication and the TSH is improving.  She said even when she was not pregnant her TSH has been up and down.  She is ranged anywhere from 100 to 175 mcg of levothyroxine.  She does have mild anemia.  She mentioned with her 4-year-old son she needed to be induced 1 day before her due date because of a nonreactive NST.  NSTs were being done because she had gestational diabetes.    O: DTR 2+, UA small LE, see flowsheet    A/P: 27-year-old  2 para 1-0-0-1 female at 35-5/7 weeks gestation.  O+ blood type.  Mild anemia.  Hypothyroid.  Currently on levothyroxine 150 mcg.  Weekly visits.  We will recheck thyroid function and hemoglobin next visit.  After pregnancy we will continue to monitor thyroid function and if we cannot keep it at a steady range we certainly can refer to endocrinology.  Today we are checking the group B strep swab.  If that is positive then you want to get to labor and delivery early in labor to get the antibiotics.  Ideally we want that in 4 hours before delivery.  You are just mildly anemic with your last hemoglobin 10.9.  Continue your prenatal vitamin with iron and a high iron diet.

## 2022-08-25 LAB — GP B STREP DNA SPEC QL NAA+PROBE: NEGATIVE

## 2022-08-31 ENCOUNTER — PRENATAL OFFICE VISIT (OUTPATIENT)
Dept: FAMILY MEDICINE | Facility: CLINIC | Age: 27
End: 2022-08-31
Payer: COMMERCIAL

## 2022-08-31 VITALS
BODY MASS INDEX: 31.13 KG/M2 | HEART RATE: 87 BPM | RESPIRATION RATE: 16 BRPM | TEMPERATURE: 98.4 F | SYSTOLIC BLOOD PRESSURE: 106 MMHG | WEIGHT: 154.13 LBS | DIASTOLIC BLOOD PRESSURE: 56 MMHG

## 2022-08-31 DIAGNOSIS — Z34.83 ENCOUNTER FOR SUPERVISION OF OTHER NORMAL PREGNANCY IN THIRD TRIMESTER: Primary | ICD-10-CM

## 2022-08-31 DIAGNOSIS — E03.9 ACQUIRED HYPOTHYROIDISM: ICD-10-CM

## 2022-08-31 DIAGNOSIS — D64.9 ANEMIA, UNSPECIFIED TYPE: ICD-10-CM

## 2022-08-31 DIAGNOSIS — M54.50 ACUTE LEFT-SIDED LOW BACK PAIN WITHOUT SCIATICA: ICD-10-CM

## 2022-08-31 LAB
ALBUMIN UR-MCNC: NEGATIVE MG/DL
APPEARANCE UR: CLEAR
BILIRUB UR QL STRIP: NEGATIVE
COLOR UR AUTO: YELLOW
GLUCOSE UR STRIP-MCNC: NEGATIVE MG/DL
HGB BLD-MCNC: 11.5 G/DL (ref 11.7–15.7)
HGB UR QL STRIP: NEGATIVE
KETONES UR STRIP-MCNC: NEGATIVE MG/DL
LEUKOCYTE ESTERASE UR QL STRIP: NEGATIVE
NITRATE UR QL: NEGATIVE
PH UR STRIP: 7 [PH] (ref 5–8)
SP GR UR STRIP: 1.01 (ref 1–1.03)
TSH SERPL DL<=0.005 MIU/L-ACNC: 4.86 UIU/ML (ref 0.3–4.2)
UROBILINOGEN UR STRIP-ACNC: 0.2 E.U./DL

## 2022-08-31 PROCEDURE — 81003 URINALYSIS AUTO W/O SCOPE: CPT | Performed by: FAMILY MEDICINE

## 2022-08-31 PROCEDURE — 84439 ASSAY OF FREE THYROXINE: CPT | Performed by: FAMILY MEDICINE

## 2022-08-31 PROCEDURE — 84443 ASSAY THYROID STIM HORMONE: CPT | Performed by: FAMILY MEDICINE

## 2022-08-31 PROCEDURE — 85018 HEMOGLOBIN: CPT | Performed by: FAMILY MEDICINE

## 2022-08-31 PROCEDURE — 99207 PR PRENATAL VISIT: CPT | Performed by: FAMILY MEDICINE

## 2022-08-31 PROCEDURE — 36415 COLL VENOUS BLD VENIPUNCTURE: CPT | Performed by: FAMILY MEDICINE

## 2022-08-31 NOTE — PATIENT INSTRUCTIONS
We will give you a lumbar or low back support.  You wear that during the daytime off at bedtime.    If you wish a referral to physical therapy let me know.    Please call the hospital if your water breaks with a gush or slow constant trickle or you are feeling contractions or squeezing every 10 to 15 minutes or more often.    Today we are checking thyroid function and hemoglobin.    Continue your prenatal vitamin with iron and high iron diet.    Follow-up in 1 week.

## 2022-08-31 NOTE — PROGRESS NOTES
S: She is overall doing well.  She started to have some left low back pain that goes up to the left mid back.  No shooting pain down her legs.  No weakness of her legs.  She is not having regular contractions.  Good fetal movement.  No lower extremity edema.  Last visit group B strep negative.  She does have hypothyroidism.  She is due for recheck of labs today.  No other questions or concerns.    O:  DTR 2+, urinalysis negative, see flowsheet    A/P: 27-year-old  2 para 1-0-0-1 female at 36-5/7 weeks gestation.  O+ blood type.  Hypothyroid.  Mild anemia.  GBS negative.  We will give you a lumbar or low back support.  You wear that during the daytime off at bedtime.  If you wish a referral to physical therapy let me know.  Please call the hospital if your water breaks with a gush or slow constant trickle or you are feeling contractions or squeezing every 10 to 15 minutes or more often.  Today we are checking thyroid function and hemoglobin.  Continue your prenatal vitamin with iron and high iron diet.  Follow-up in 1 week.

## 2022-09-01 LAB — T4 FREE SERPL-MCNC: 0.95 NG/DL (ref 0.9–1.7)

## 2022-09-08 ENCOUNTER — PRENATAL OFFICE VISIT (OUTPATIENT)
Dept: FAMILY MEDICINE | Facility: CLINIC | Age: 27
End: 2022-09-08
Payer: COMMERCIAL

## 2022-09-08 VITALS
WEIGHT: 153.38 LBS | DIASTOLIC BLOOD PRESSURE: 52 MMHG | SYSTOLIC BLOOD PRESSURE: 100 MMHG | BODY MASS INDEX: 30.98 KG/M2

## 2022-09-08 DIAGNOSIS — Z34.83 ENCOUNTER FOR SUPERVISION OF OTHER NORMAL PREGNANCY IN THIRD TRIMESTER: Primary | ICD-10-CM

## 2022-09-08 LAB
ALBUMIN UR-MCNC: NEGATIVE MG/DL
APPEARANCE UR: CLEAR
BILIRUB UR QL STRIP: NEGATIVE
COLOR UR AUTO: YELLOW
GLUCOSE UR STRIP-MCNC: NEGATIVE MG/DL
HGB UR QL STRIP: NEGATIVE
KETONES UR STRIP-MCNC: NEGATIVE MG/DL
LEUKOCYTE ESTERASE UR QL STRIP: NEGATIVE
NITRATE UR QL: NEGATIVE
PH UR STRIP: 7 [PH] (ref 5–8)
SP GR UR STRIP: 1.01 (ref 1–1.03)
UROBILINOGEN UR STRIP-ACNC: 0.2 E.U./DL

## 2022-09-08 PROCEDURE — 99207 PR PRENATAL VISIT: CPT | Performed by: FAMILY MEDICINE

## 2022-09-08 PROCEDURE — 81003 URINALYSIS AUTO W/O SCOPE: CPT | Performed by: FAMILY MEDICINE

## 2022-09-08 NOTE — PATIENT INSTRUCTIONS
Lumbar or low back support.  You wear that during the daytime off at bedtime.    Hand-held ultrasound confirmed vertex.    If you wish a referral to physical therapy let me know.    Please call the hospital if your water breaks with a gush or slow constant trickle or you are feeling contractions or squeezing every 10 to 15 minutes or more often.    Will check thyroid function next visit.    Continue your prenatal vitamin with iron and high iron diet.    Follow-up in 1 week.    If you do make it to your due date then we do want an ultrasound and a nonstress test.  Nonstress test to be done at one of our visits is monitoring you for 20 to 30 minutes to make sure we have nice heart rate accelerations for baby.  Then we would order an ultrasound to be done up at the hospital for biophysical profile which looks at baby's happiness and healthiness going overdue.    We can discuss an induction at 39 weeks or after if your cervix is ready.    Otherwise if the cervix is not ready we would induce you at 41 weeks.

## 2022-09-09 NOTE — PROGRESS NOTES
S: Doing well.  Good fetal movement.  The belly band has helped her low back pain.  She does not wish to do physical therapy.  No questions or concerns.  Last visit TSH coming down to 4.86.  Since she had just increased her medication recently we will give that some more time.  Hemoglobin 11.5.    O:  DTR 2+, UA neg, see flowsheet    A/P: 27-year-old  2 para 1-0-0-1 female at 37-6/7 weeks gestation.  Anemia.  Hypothyroid.  Lumbar or low back support.  You wear that during the daytime off at bedtime.  Hand-held ultrasound confirmed vertex.  If you wish a referral to physical therapy let me know.  Please call the hospital if your water breaks with a gush or slow constant trickle or you are feeling contractions or squeezing every 10 to 15 minutes or more often.  Will check thyroid function next visit.  Continue your prenatal vitamin with iron and high iron diet.  Follow-up in 1 week.  If you do make it to your due date then we do want an ultrasound and a nonstress test.  Nonstress test to be done at one of our visits is monitoring you for 20 to 30 minutes to make sure we have nice heart rate accelerations for baby.  Then we would order an ultrasound to be done up at the hospital for biophysical profile which looks at baby's happiness and healthiness going overdue.  We can discuss an induction at 39 weeks or after if your cervix is ready.  Otherwise if the cervix is not ready we would induce you at 41 weeks.

## 2022-09-14 ENCOUNTER — PRENATAL OFFICE VISIT (OUTPATIENT)
Dept: FAMILY MEDICINE | Facility: CLINIC | Age: 27
End: 2022-09-14
Payer: COMMERCIAL

## 2022-09-14 VITALS
DIASTOLIC BLOOD PRESSURE: 64 MMHG | BODY MASS INDEX: 30.9 KG/M2 | TEMPERATURE: 98.1 F | WEIGHT: 153 LBS | HEART RATE: 88 BPM | SYSTOLIC BLOOD PRESSURE: 104 MMHG

## 2022-09-14 DIAGNOSIS — E03.9 ACQUIRED HYPOTHYROIDISM: Primary | ICD-10-CM

## 2022-09-14 PROCEDURE — 99207 PR PRENATAL VISIT: CPT | Performed by: FAMILY MEDICINE

## 2022-09-14 PROCEDURE — 36415 COLL VENOUS BLD VENIPUNCTURE: CPT | Performed by: FAMILY MEDICINE

## 2022-09-14 PROCEDURE — 84439 ASSAY OF FREE THYROXINE: CPT | Performed by: FAMILY MEDICINE

## 2022-09-14 PROCEDURE — 84443 ASSAY THYROID STIM HORMONE: CPT | Performed by: FAMILY MEDICINE

## 2022-09-14 NOTE — PROGRESS NOTES
S: Belly band is helping with low back pain.  No regular contractions.  With her son, she had GDM and having NSTS and did not pass, needed induction with Cytotec.  Induction started 1 pm and baby born the next day at 7 pm.  She has no concerns today.  She is not wishing to set an induction at this point and cervix is not ready.    O:  DTR 2+, UA neg.    A/P: 27-year-old  2 para 1-0-0-1 female at 30-5/7 weeks gestation.  Hypothyroid.  Mild anemia.  If any signs of labor such as juan every 10 to 15 minutes or more often or your water breaks with a gush or slow constant trickle please give a call to [facility] labor and delivery at  and then plan to arrive in the hospital.  We will check thyroid function today.  Continue your prenatal vitamin with iron and high iron diet.  Follow-up in 1 week.  Next week if the cervix is ready we could discuss an elective induction otherwise we will plan on that around 41 weeks.  Next week if not delivered we will order a biophysical profile ultrasound and do a nonstress test.  Dr. Courtney is unavailable for delivery from the morning of  through the afternoon of .  On-call provider will cover if needed for delivery.

## 2022-09-14 NOTE — PATIENT INSTRUCTIONS
If any signs of labor such as juan every 10 to 15 minutes or more often or your water breaks with a gush or slow constant trickle please give a call to [facility] labor and delivery at  and then plan to arrive in the hospital.    We will check thyroid function today.    Continue your prenatal vitamin with iron and high iron diet.    Follow-up in 1 week.    Next week if the cervix is ready we could discuss an elective induction otherwise we will plan on that around 41 weeks.    Next week if not delivered we will order a biophysical profile ultrasound and do a nonstress test.

## 2022-09-16 LAB
T4 FREE SERPL-MCNC: 0.95 NG/DL (ref 0.9–1.7)
TSH SERPL DL<=0.005 MIU/L-ACNC: 4.68 UIU/ML (ref 0.3–4.2)

## 2022-09-22 ENCOUNTER — PRENATAL OFFICE VISIT (OUTPATIENT)
Dept: FAMILY MEDICINE | Facility: CLINIC | Age: 27
End: 2022-09-22
Payer: COMMERCIAL

## 2022-09-22 VITALS
BODY MASS INDEX: 31.41 KG/M2 | TEMPERATURE: 98.4 F | HEART RATE: 89 BPM | DIASTOLIC BLOOD PRESSURE: 69 MMHG | RESPIRATION RATE: 16 BRPM | SYSTOLIC BLOOD PRESSURE: 117 MMHG | WEIGHT: 155.5 LBS

## 2022-09-22 DIAGNOSIS — Z34.83 ENCOUNTER FOR SUPERVISION OF OTHER NORMAL PREGNANCY IN THIRD TRIMESTER: Primary | ICD-10-CM

## 2022-09-22 DIAGNOSIS — O48.0 POST-TERM PREGNANCY, 40-42 WEEKS OF GESTATION: ICD-10-CM

## 2022-09-22 LAB
ALBUMIN UR-MCNC: NEGATIVE MG/DL
APPEARANCE UR: CLEAR
BILIRUB UR QL STRIP: NEGATIVE
COLOR UR AUTO: YELLOW
GLUCOSE UR STRIP-MCNC: 100 MG/DL
HGB UR QL STRIP: NEGATIVE
KETONES UR STRIP-MCNC: NEGATIVE MG/DL
LEUKOCYTE ESTERASE UR QL STRIP: ABNORMAL
NITRATE UR QL: NEGATIVE
PH UR STRIP: 7 [PH] (ref 5–8)
SP GR UR STRIP: 1.01 (ref 1–1.03)
UROBILINOGEN UR STRIP-ACNC: 0.2 E.U./DL

## 2022-09-22 PROCEDURE — 59025 FETAL NON-STRESS TEST: CPT | Performed by: FAMILY MEDICINE

## 2022-09-22 PROCEDURE — 99207 PR PRENATAL VISIT: CPT | Performed by: FAMILY MEDICINE

## 2022-09-22 PROCEDURE — 81003 URINALYSIS AUTO W/O SCOPE: CPT | Performed by: FAMILY MEDICINE

## 2022-09-22 NOTE — PROGRESS NOTES
S: Overall doing well.  Good fetal movement.  No regular contractions.  She would like to wait until 41 weeks for induction.    O:  DTR 2+, Ua-sm LE, 100 glu, see flowsheet    A/P: 27-year-old  2 para 1-0-0-1 female at 39-6/7 weeks gestation.  Hypothyroid.  Mild anemia.  GBS negative.  Since we just adjusted your thyroid medication a month ago lets continue the same dose of levothyroxine at 150 mcg daily.  We can recheck thyroid function after delivery.  Lumbar or low back support.  You wear that during the daytime off at bedtime.  Continue your prenatal vitamin with iron and high iron diet.  If you are feeling tightening of the belly or contractions every 10 to 15 minutes or more often or your water breaks with a gush or slow constant trickle please give a call to the hospital.  [facility] labor and delivery number is .  Declined Covid and flu shots.  If you have not delivered by tomorrow we do want a biophysical profile ultrasound.  You can call [phone] to set that up at either [facility] or [facility].  Nonstress test today reactive.  That is normal.  We will add you on next  for an extra OB appointment at 4:20 but please come at 4:00 for rooming.  We will repeat the nonstress test at the appointment next Monday and Thursday.  If you have not delivered we have an induction for  starting at 4 PM at [facility].  Call at 3:00, plan arrive at 4:00.  This is to ensure there is a bed and a nurse available.  Collect Covid swab at appointment 22 at clinic.

## 2022-09-22 NOTE — PATIENT INSTRUCTIONS
Since we just adjusted your thyroid medication a month ago lets continue the same dose of levothyroxine at 150 mcg daily.  We can recheck thyroid function after delivery.    Lumbar or low back support.  You wear that during the daytime off at bedtime.    Continue your prenatal vitamin with iron and high iron diet.    If you are feeling tightening of the belly or contractions every 10 to 15 minutes or more often or your water breaks with a gush or slow constant trickle please give a call to the hospital.    [facility] labor and delivery number is .    Declined Covid and flu shots.    If you have not delivered by tomorrow we do want a biophysical profile ultrasound.  You can call [phone] to set that up at either [facility] or [facility].    Nonstress test today reactive.  That is normal.    We will add you on next Monday Sept. 26 for an extra OB appointment at 4:20 but please come at 4:00 for rooming.    We will repeat the nonstress test at the appointment next Monday and Thursday.    If you have not delivered we have an induction for Friday, September 30 starting at 4 PM at [facility].    Call at 3:00, plan arrive at 4:00.  This is to ensure there is a bed and a nurse available.    Collect Covid swab at appointment 9-29-22 at clinic.

## 2022-09-23 ENCOUNTER — HOSPITAL ENCOUNTER (OUTPATIENT)
Dept: ULTRASOUND IMAGING | Facility: CLINIC | Age: 27
Discharge: HOME | End: 2022-09-23
Attending: FAMILY MEDICINE
Payer: COMMERCIAL

## 2022-09-23 DIAGNOSIS — O48.0 POST-TERM PREGNANCY, 40-42 WEEKS OF GESTATION: ICD-10-CM

## 2022-09-23 PROCEDURE — 76819 FETAL BIOPHYS PROFIL W/O NST: CPT

## 2022-09-26 ENCOUNTER — PRENATAL OFFICE VISIT (OUTPATIENT)
Dept: FAMILY MEDICINE | Facility: CLINIC | Age: 27
End: 2022-09-26
Payer: COMMERCIAL

## 2022-09-26 VITALS
TEMPERATURE: 98.2 F | WEIGHT: 159.13 LBS | RESPIRATION RATE: 16 BRPM | BODY MASS INDEX: 32.14 KG/M2 | DIASTOLIC BLOOD PRESSURE: 67 MMHG | SYSTOLIC BLOOD PRESSURE: 112 MMHG | HEART RATE: 90 BPM

## 2022-09-26 DIAGNOSIS — Z34.83 ENCOUNTER FOR SUPERVISION OF OTHER NORMAL PREGNANCY IN THIRD TRIMESTER: ICD-10-CM

## 2022-09-26 DIAGNOSIS — D64.9 ANEMIA, UNSPECIFIED TYPE: ICD-10-CM

## 2022-09-26 DIAGNOSIS — O48.0 POST-TERM PREGNANCY, 40-42 WEEKS OF GESTATION: Primary | ICD-10-CM

## 2022-09-26 DIAGNOSIS — E03.9 ACQUIRED HYPOTHYROIDISM: ICD-10-CM

## 2022-09-26 LAB
ALBUMIN UR-MCNC: NEGATIVE MG/DL
APPEARANCE UR: CLEAR
BILIRUB UR QL STRIP: NEGATIVE
COLOR UR AUTO: YELLOW
GLUCOSE UR STRIP-MCNC: NEGATIVE MG/DL
HGB BLD-MCNC: 12 G/DL (ref 11.7–15.7)
HGB UR QL STRIP: NEGATIVE
KETONES UR STRIP-MCNC: NEGATIVE MG/DL
LEUKOCYTE ESTERASE UR QL STRIP: NEGATIVE
NITRATE UR QL: NEGATIVE
PH UR STRIP: 7 [PH] (ref 5–8)
SP GR UR STRIP: 1.01 (ref 1–1.03)
T4 FREE SERPL-MCNC: 0.87 NG/DL (ref 0.9–1.7)
TSH SERPL DL<=0.005 MIU/L-ACNC: 5.24 UIU/ML (ref 0.3–4.2)
UROBILINOGEN UR STRIP-ACNC: 0.2 E.U./DL

## 2022-09-26 PROCEDURE — 99207 PR PRENATAL VISIT: CPT | Performed by: FAMILY MEDICINE

## 2022-09-26 PROCEDURE — 84443 ASSAY THYROID STIM HORMONE: CPT | Performed by: FAMILY MEDICINE

## 2022-09-26 PROCEDURE — 85018 HEMOGLOBIN: CPT | Performed by: FAMILY MEDICINE

## 2022-09-26 PROCEDURE — 84439 ASSAY OF FREE THYROXINE: CPT | Performed by: FAMILY MEDICINE

## 2022-09-26 PROCEDURE — U0003 INFECTIOUS AGENT DETECTION BY NUCLEIC ACID (DNA OR RNA); SEVERE ACUTE RESPIRATORY SYNDROME CORONAVIRUS 2 (SARS-COV-2) (CORONAVIRUS DISEASE [COVID-19]), AMPLIFIED PROBE TECHNIQUE, MAKING USE OF HIGH THROUGHPUT TECHNOLOGIES AS DESCRIBED BY CMS-2020-01-R: HCPCS | Performed by: FAMILY MEDICINE

## 2022-09-26 PROCEDURE — U0005 INFEC AGEN DETEC AMPLI PROBE: HCPCS | Performed by: FAMILY MEDICINE

## 2022-09-26 PROCEDURE — 36415 COLL VENOUS BLD VENIPUNCTURE: CPT | Performed by: FAMILY MEDICINE

## 2022-09-26 PROCEDURE — 81003 URINALYSIS AUTO W/O SCOPE: CPT | Performed by: FAMILY MEDICINE

## 2022-09-26 PROCEDURE — 59025 FETAL NON-STRESS TEST: CPT | Performed by: FAMILY MEDICINE

## 2022-09-26 NOTE — PROGRESS NOTES
S: Overall doing well but getting a little uncomfortable going overdue.  Very good fetal movement.  Occasional contractions but nothing regular.  No questions or concerns today.  She did have a biophysical profile ultrasound a few days ago that was normal at 8 out of 8, growth normal at 39th percentile and vertex.    O: DTR 2+, UA negative, see flowsheet    A/P: 27-year-old  2 para 1-0-1-1 female at 40-3/7 weeks gestation.  Anemia.  Hypothyroid.  Group B strep negative.  Postdates.  Lumbar or low back support.  You wear that during the daytime off at bedtime.  Continue your prenatal vitamin with iron and high iron diet.  If you are feeling cramping, tightening of the belly or contractions every 10 to 15 minutes or more often or your water breaks with a gush or slow constant trickle please give a call to the hospital.  [facility] labor and delivery number is .  Declined Covid and flu shots.  Nonstress test today reactive.  That is normal.  If not delivered, next OB apt this Thurs to come at 11:30 for rooming and NST.  If you have not delivered we have an induction for  starting at 4 PM at [facility].  Call at 3:00, plan arrive at 4:00.  This is to ensure there is a bed and a nurse available.  Covid swab today in preparation for induction.

## 2022-09-26 NOTE — PATIENT INSTRUCTIONS
Lumbar or low back support.  You wear that during the daytime off at bedtime.    Continue your prenatal vitamin with iron and high iron diet.    If you are feeling cramping, tightening of the belly or contractions every 10 to 15 minutes or more often or your water breaks with a gush or slow constant trickle please give a call to the hospital.  [facility] labor and delivery number is .    Declined Covid and flu shots.    Nonstress test today reactive.  That is normal.  If not delivered, next OB apt this Thurs to come at 11:30 for rooming and NST.    If you have not delivered we have an induction for Friday, September 30 starting at 4 PM at [facility].  Call at 3:00, plan arrive at 4:00.  This is to ensure there is a bed and a nurse available.    Covid swab today in preparation for induction.

## 2022-09-27 LAB — SARS-COV-2 RNA RESP QL NAA+PROBE: NEGATIVE

## 2022-09-29 ENCOUNTER — PRENATAL OFFICE VISIT (OUTPATIENT)
Dept: FAMILY MEDICINE | Facility: CLINIC | Age: 27
End: 2022-09-29
Payer: COMMERCIAL

## 2022-09-29 VITALS
DIASTOLIC BLOOD PRESSURE: 65 MMHG | BODY MASS INDEX: 31.53 KG/M2 | WEIGHT: 156.13 LBS | TEMPERATURE: 98.3 F | SYSTOLIC BLOOD PRESSURE: 112 MMHG | HEART RATE: 78 BPM | RESPIRATION RATE: 16 BRPM

## 2022-09-29 DIAGNOSIS — Z34.83 ENCOUNTER FOR SUPERVISION OF OTHER NORMAL PREGNANCY IN THIRD TRIMESTER: Primary | ICD-10-CM

## 2022-09-29 PROCEDURE — 99207 PR PRENATAL VISIT: CPT | Performed by: FAMILY MEDICINE

## 2022-09-29 PROCEDURE — 81003 URINALYSIS AUTO W/O SCOPE: CPT | Performed by: FAMILY MEDICINE

## 2022-09-29 PROCEDURE — 59025 FETAL NON-STRESS TEST: CPT | Performed by: FAMILY MEDICINE

## 2022-09-29 NOTE — PROGRESS NOTES
S:  Feeling well.  Occasional contractions.  3 days ago Coivd negative.  TSH 5.24.  Hgb 12.0.      O:  DTR 2+, UA-100 glu, Tr LE, see flowsheet    A/P: 27-year-old  2 para 1-0-0-1 female at 40-6/7 weeks gestation.  O+ blood type.  Negative group B strep.  Hypothyroid.  Mild anemia.Lumbar or low back support.  You wear that during the daytime off at bedtime.  Continue your prenatal vitamin with iron and high iron diet.  If you are feeling cramping, tightening of the belly or contractions every 10 to 15 minutes or more often or your water breaks with a gush or slow constant trickle please give a call to the hospital.  [facility] labor and delivery number is .  Nonstress test today reactive.  That is normal.  If you have not delivered we have an induction for  starting at 4 PM at [facility].  Call at 3:00, plan arrive at 4:00.  This is to ensure there is a bed and a nurse available.  Covid swab from last visit is negative.

## 2022-09-29 NOTE — PATIENT INSTRUCTIONS
Lumbar or low back support.  You wear that during the daytime off at bedtime.    Continue your prenatal vitamin with iron and high iron diet.    If you are feeling cramping, tightening of the belly or contractions every 10 to 15 minutes or more often or your water breaks with a gush or slow constant trickle please give a call to the hospital.    [facility] labor and delivery number is .    Nonstress test today reactive.  That is normal.    If you have not delivered we have an induction for Friday, September 30 starting at 4 PM at [facility].  Call at 3:00, plan arrive at 4:00.  This is to ensure there is a bed and a nurse available.    Covid swab from last visit is negative.

## 2022-09-30 ENCOUNTER — HOSPITAL ENCOUNTER (INPATIENT)
Facility: CLINIC | Age: 27
LOS: 2 days | Discharge: HOME | End: 2022-10-02
Attending: FAMILY MEDICINE | Admitting: FAMILY MEDICINE
Payer: COMMERCIAL

## 2022-09-30 PROBLEM — Z34.90 PREGNANCY: Status: ACTIVE | Noted: 2022-09-30

## 2022-09-30 LAB
ABO/RH(D): NORMAL
ANTIBODY SCREEN: NEGATIVE
ERYTHROCYTE [DISTWIDTH] IN BLOOD BY AUTOMATED COUNT: 13.6 % (ref 10–15)
HCT VFR BLD AUTO: 35.6 % (ref 35–47)
HGB BLD-MCNC: 12.2 G/DL (ref 11.7–15.7)
MCH RBC QN AUTO: 29.3 PG (ref 26.5–33)
MCHC RBC AUTO-ENTMCNC: 34.3 G/DL (ref 31.5–36.5)
MCV RBC AUTO: 86 FL (ref 78–100)
PLATELET # BLD AUTO: 210 10E3/UL (ref 150–450)
RBC # BLD AUTO: 4.16 10E6/UL (ref 3.8–5.2)
SPECIMEN EXPIRATION DATE: NORMAL
WBC # BLD AUTO: 11.8 10E3/UL (ref 4–11)

## 2022-09-30 PROCEDURE — 120N000001 HC R&B MED SURG/OB

## 2022-09-30 PROCEDURE — 86780 TREPONEMA PALLIDUM: CPT | Performed by: FAMILY MEDICINE

## 2022-09-30 PROCEDURE — 250N000013 HC RX MED GY IP 250 OP 250 PS 637: Performed by: FAMILY MEDICINE

## 2022-09-30 PROCEDURE — 36415 COLL VENOUS BLD VENIPUNCTURE: CPT | Performed by: FAMILY MEDICINE

## 2022-09-30 PROCEDURE — 86901 BLOOD TYPING SEROLOGIC RH(D): CPT | Performed by: FAMILY MEDICINE

## 2022-09-30 PROCEDURE — 85014 HEMATOCRIT: CPT | Performed by: FAMILY MEDICINE

## 2022-09-30 PROCEDURE — 99221 1ST HOSP IP/OBS SF/LOW 40: CPT | Performed by: FAMILY MEDICINE

## 2022-09-30 RX ORDER — CITRIC ACID/SODIUM CITRATE 334-500MG
30 SOLUTION, ORAL ORAL
Status: DISCONTINUED | OUTPATIENT
Start: 2022-09-30 | End: 2022-10-01 | Stop reason: HOSPADM

## 2022-09-30 RX ORDER — METHYLERGONOVINE MALEATE 0.2 MG/ML
200 INJECTION INTRAVENOUS
Status: DISCONTINUED | OUTPATIENT
Start: 2022-09-30 | End: 2022-10-01 | Stop reason: HOSPADM

## 2022-09-30 RX ORDER — METOCLOPRAMIDE 10 MG/1
10 TABLET ORAL EVERY 6 HOURS PRN
Status: DISCONTINUED | OUTPATIENT
Start: 2022-09-30 | End: 2022-10-01 | Stop reason: HOSPADM

## 2022-09-30 RX ORDER — MISOPROSTOL 200 UG/1
400 TABLET ORAL
Status: DISCONTINUED | OUTPATIENT
Start: 2022-09-30 | End: 2022-10-01 | Stop reason: HOSPADM

## 2022-09-30 RX ORDER — METOCLOPRAMIDE HYDROCHLORIDE 5 MG/ML
10 INJECTION INTRAMUSCULAR; INTRAVENOUS EVERY 6 HOURS PRN
Status: DISCONTINUED | OUTPATIENT
Start: 2022-09-30 | End: 2022-10-01 | Stop reason: HOSPADM

## 2022-09-30 RX ORDER — NALOXONE HYDROCHLORIDE 0.4 MG/ML
0.2 INJECTION, SOLUTION INTRAMUSCULAR; INTRAVENOUS; SUBCUTANEOUS
Status: DISCONTINUED | OUTPATIENT
Start: 2022-09-30 | End: 2022-10-01 | Stop reason: HOSPADM

## 2022-09-30 RX ORDER — PROCHLORPERAZINE MALEATE 10 MG
10 TABLET ORAL EVERY 6 HOURS PRN
Status: DISCONTINUED | OUTPATIENT
Start: 2022-09-30 | End: 2022-10-01 | Stop reason: HOSPADM

## 2022-09-30 RX ORDER — CARBOPROST TROMETHAMINE 250 UG/ML
250 INJECTION, SOLUTION INTRAMUSCULAR
Status: DISCONTINUED | OUTPATIENT
Start: 2022-09-30 | End: 2022-10-01 | Stop reason: HOSPADM

## 2022-09-30 RX ORDER — OXYTOCIN/0.9 % SODIUM CHLORIDE 30/500 ML
340 PLASTIC BAG, INJECTION (ML) INTRAVENOUS CONTINUOUS PRN
Status: DISCONTINUED | OUTPATIENT
Start: 2022-09-30 | End: 2022-10-01 | Stop reason: HOSPADM

## 2022-09-30 RX ORDER — ONDANSETRON 4 MG/1
4 TABLET, ORALLY DISINTEGRATING ORAL EVERY 6 HOURS PRN
Status: DISCONTINUED | OUTPATIENT
Start: 2022-09-30 | End: 2022-10-01 | Stop reason: HOSPADM

## 2022-09-30 RX ORDER — MISOPROSTOL 100 UG/1
25 TABLET ORAL
Status: DISCONTINUED | OUTPATIENT
Start: 2022-09-30 | End: 2022-10-01 | Stop reason: HOSPADM

## 2022-09-30 RX ORDER — OXYTOCIN 10 [USP'U]/ML
10 INJECTION, SOLUTION INTRAMUSCULAR; INTRAVENOUS
Status: COMPLETED | OUTPATIENT
Start: 2022-09-30 | End: 2022-10-01

## 2022-09-30 RX ORDER — ONDANSETRON 2 MG/ML
4 INJECTION INTRAMUSCULAR; INTRAVENOUS EVERY 6 HOURS PRN
Status: DISCONTINUED | OUTPATIENT
Start: 2022-09-30 | End: 2022-10-01 | Stop reason: HOSPADM

## 2022-09-30 RX ORDER — MORPHINE SULFATE 10 MG/ML
10 INJECTION, SOLUTION INTRAMUSCULAR; INTRAVENOUS
Status: DISCONTINUED | OUTPATIENT
Start: 2022-09-30 | End: 2022-10-01 | Stop reason: HOSPADM

## 2022-09-30 RX ORDER — OXYTOCIN/0.9 % SODIUM CHLORIDE 30/500 ML
100-340 PLASTIC BAG, INJECTION (ML) INTRAVENOUS CONTINUOUS PRN
Status: DISCONTINUED | OUTPATIENT
Start: 2022-09-30 | End: 2022-10-02 | Stop reason: HOSPADM

## 2022-09-30 RX ORDER — NALOXONE HYDROCHLORIDE 0.4 MG/ML
0.4 INJECTION, SOLUTION INTRAMUSCULAR; INTRAVENOUS; SUBCUTANEOUS
Status: DISCONTINUED | OUTPATIENT
Start: 2022-09-30 | End: 2022-10-01 | Stop reason: HOSPADM

## 2022-09-30 RX ORDER — PROCHLORPERAZINE 25 MG
25 SUPPOSITORY, RECTAL RECTAL EVERY 12 HOURS PRN
Status: DISCONTINUED | OUTPATIENT
Start: 2022-09-30 | End: 2022-10-01 | Stop reason: HOSPADM

## 2022-09-30 RX ORDER — KETOROLAC TROMETHAMINE 30 MG/ML
30 INJECTION, SOLUTION INTRAMUSCULAR; INTRAVENOUS
Status: DISCONTINUED | OUTPATIENT
Start: 2022-09-30 | End: 2022-10-02 | Stop reason: HOSPADM

## 2022-09-30 RX ORDER — MISOPROSTOL 200 UG/1
800 TABLET ORAL
Status: DISCONTINUED | OUTPATIENT
Start: 2022-09-30 | End: 2022-10-01 | Stop reason: HOSPADM

## 2022-09-30 RX ORDER — IBUPROFEN 800 MG/1
800 TABLET, FILM COATED ORAL
Status: DISCONTINUED | OUTPATIENT
Start: 2022-09-30 | End: 2022-10-02 | Stop reason: HOSPADM

## 2022-09-30 RX ORDER — OXYTOCIN 10 [USP'U]/ML
10 INJECTION, SOLUTION INTRAMUSCULAR; INTRAVENOUS
Status: DISCONTINUED | OUTPATIENT
Start: 2022-09-30 | End: 2022-10-01 | Stop reason: HOSPADM

## 2022-09-30 RX ORDER — LIDOCAINE 40 MG/G
CREAM TOPICAL
Status: DISCONTINUED | OUTPATIENT
Start: 2022-09-30 | End: 2022-10-01 | Stop reason: HOSPADM

## 2022-09-30 RX ORDER — HYDROXYZINE HYDROCHLORIDE 25 MG/1
50 TABLET, FILM COATED ORAL
Status: DISCONTINUED | OUTPATIENT
Start: 2022-09-30 | End: 2022-10-01 | Stop reason: HOSPADM

## 2022-09-30 RX ADMIN — MISOPROSTOL 25 MCG: 100 TABLET ORAL at 21:30

## 2022-09-30 RX ADMIN — MISOPROSTOL 25 MCG: 100 TABLET ORAL at 23:31

## 2022-09-30 RX ADMIN — MISOPROSTOL 25 MCG: 100 TABLET ORAL at 19:30

## 2022-09-30 RX ADMIN — MISOPROSTOL 25 MCG: 100 TABLET ORAL at 17:17

## 2022-09-30 ASSESSMENT — ACTIVITIES OF DAILY LIVING (ADL)
ADLS_ACUITY_SCORE: 18
DRESSING/BATHING_DIFFICULTY: NO
ADLS_ACUITY_SCORE: 18
TOILETING_ISSUES: NO
WALKING_OR_CLIMBING_STAIRS_DIFFICULTY: NO
FALL_HISTORY_WITHIN_LAST_SIX_MONTHS: NO
CONCENTRATING,_REMEMBERING_OR_MAKING_DECISIONS_DIFFICULTY: NO
CHANGE_IN_FUNCTIONAL_STATUS_SINCE_ONSET_OF_CURRENT_ILLNESS/INJURY: NO
ADLS_ACUITY_SCORE: 18
ADLS_ACUITY_SCORE: 18
DIFFICULTY_EATING/SWALLOWING: NO
WEAR_GLASSES_OR_BLIND: NO
DOING_ERRANDS_INDEPENDENTLY_DIFFICULTY: NO

## 2022-10-01 ENCOUNTER — ANESTHESIA (OUTPATIENT)
Dept: OBGYN | Facility: CLINIC | Age: 27
End: 2022-10-01
Payer: COMMERCIAL

## 2022-10-01 ENCOUNTER — ANESTHESIA EVENT (OUTPATIENT)
Dept: OBGYN | Facility: CLINIC | Age: 27
End: 2022-10-01
Payer: COMMERCIAL

## 2022-10-01 ENCOUNTER — HEALTH MAINTENANCE LETTER (OUTPATIENT)
Age: 27
End: 2022-10-01

## 2022-10-01 LAB
HGB BLD-MCNC: 12.1 G/DL (ref 11.7–15.7)
PLATELET # BLD AUTO: 189 10E3/UL (ref 150–450)
T PALLIDUM AB SER QL: NONREACTIVE

## 2022-10-01 PROCEDURE — 85049 AUTOMATED PLATELET COUNT: CPT | Performed by: FAMILY MEDICINE

## 2022-10-01 PROCEDURE — 258N000003 HC RX IP 258 OP 636: Performed by: FAMILY MEDICINE

## 2022-10-01 PROCEDURE — 250N000013 HC RX MED GY IP 250 OP 250 PS 637: Performed by: FAMILY MEDICINE

## 2022-10-01 PROCEDURE — 36415 COLL VENOUS BLD VENIPUNCTURE: CPT | Performed by: FAMILY MEDICINE

## 2022-10-01 PROCEDURE — 250N000011 HC RX IP 250 OP 636: Performed by: ANESTHESIOLOGY

## 2022-10-01 PROCEDURE — 250N000009 HC RX 250: Performed by: ANESTHESIOLOGY

## 2022-10-01 PROCEDURE — 250N000011 HC RX IP 250 OP 636: Performed by: FAMILY MEDICINE

## 2022-10-01 PROCEDURE — 59400 OBSTETRICAL CARE: CPT | Performed by: FAMILY MEDICINE

## 2022-10-01 PROCEDURE — 0UQMXZZ REPAIR VULVA, EXTERNAL APPROACH: ICD-10-PCS | Performed by: FAMILY MEDICINE

## 2022-10-01 PROCEDURE — 00HU33Z INSERTION OF INFUSION DEVICE INTO SPINAL CANAL, PERCUTANEOUS APPROACH: ICD-10-PCS | Performed by: ANESTHESIOLOGY

## 2022-10-01 PROCEDURE — 722N000001 HC LABOR CARE VAGINAL DELIVERY SINGLE

## 2022-10-01 PROCEDURE — 10907ZC DRAINAGE OF AMNIOTIC FLUID, THERAPEUTIC FROM PRODUCTS OF CONCEPTION, VIA NATURAL OR ARTIFICIAL OPENING: ICD-10-PCS | Performed by: FAMILY MEDICINE

## 2022-10-01 PROCEDURE — 3E0R3BZ INTRODUCTION OF ANESTHETIC AGENT INTO SPINAL CANAL, PERCUTANEOUS APPROACH: ICD-10-PCS | Performed by: ANESTHESIOLOGY

## 2022-10-01 PROCEDURE — 250N000009 HC RX 250: Performed by: FAMILY MEDICINE

## 2022-10-01 PROCEDURE — 370N000003 HC ANESTHESIA WARD SERVICE

## 2022-10-01 PROCEDURE — 120N000001 HC R&B MED SURG/OB

## 2022-10-01 PROCEDURE — 85018 HEMOGLOBIN: CPT | Performed by: FAMILY MEDICINE

## 2022-10-01 RX ORDER — LIDOCAINE HYDROCHLORIDE AND EPINEPHRINE 15; 5 MG/ML; UG/ML
INJECTION, SOLUTION EPIDURAL PRN
Status: DISCONTINUED | OUTPATIENT
Start: 2022-10-01 | End: 2022-10-01

## 2022-10-01 RX ORDER — SODIUM CHLORIDE, SODIUM LACTATE, POTASSIUM CHLORIDE, CALCIUM CHLORIDE 600; 310; 30; 20 MG/100ML; MG/100ML; MG/100ML; MG/100ML
INJECTION, SOLUTION INTRAVENOUS CONTINUOUS
Status: DISCONTINUED | OUTPATIENT
Start: 2022-10-01 | End: 2022-10-01

## 2022-10-01 RX ORDER — IBUPROFEN 800 MG/1
800 TABLET, FILM COATED ORAL EVERY 6 HOURS PRN
Status: DISCONTINUED | OUTPATIENT
Start: 2022-10-01 | End: 2022-10-02 | Stop reason: HOSPADM

## 2022-10-01 RX ORDER — ONDANSETRON 2 MG/ML
4 INJECTION INTRAMUSCULAR; INTRAVENOUS EVERY 6 HOURS PRN
Status: DISCONTINUED | OUTPATIENT
Start: 2022-10-01 | End: 2022-10-01 | Stop reason: HOSPADM

## 2022-10-01 RX ORDER — BISACODYL 10 MG
10 SUPPOSITORY, RECTAL RECTAL DAILY PRN
Status: DISCONTINUED | OUTPATIENT
Start: 2022-10-01 | End: 2022-10-02 | Stop reason: HOSPADM

## 2022-10-01 RX ORDER — MISOPROSTOL 200 UG/1
400 TABLET ORAL
Status: DISCONTINUED | OUTPATIENT
Start: 2022-10-01 | End: 2022-10-02 | Stop reason: HOSPADM

## 2022-10-01 RX ORDER — EPHEDRINE SULFATE 50 MG/ML
5 INJECTION, SOLUTION INTRAMUSCULAR; INTRAVENOUS; SUBCUTANEOUS
Status: DISCONTINUED | OUTPATIENT
Start: 2022-10-01 | End: 2022-10-01 | Stop reason: HOSPADM

## 2022-10-01 RX ORDER — FENTANYL/ROPIVACAINE/NS/PF 2MCG/ML-.1
PLASTIC BAG, INJECTION (ML) EPIDURAL
Status: DISCONTINUED | OUTPATIENT
Start: 2022-10-01 | End: 2022-10-01 | Stop reason: HOSPADM

## 2022-10-01 RX ORDER — MODIFIED LANOLIN
OINTMENT (GRAM) TOPICAL
Status: DISCONTINUED | OUTPATIENT
Start: 2022-10-01 | End: 2022-10-02 | Stop reason: HOSPADM

## 2022-10-01 RX ORDER — ACETAMINOPHEN 325 MG/1
650 TABLET ORAL EVERY 4 HOURS PRN
Status: DISCONTINUED | OUTPATIENT
Start: 2022-10-01 | End: 2022-10-02 | Stop reason: HOSPADM

## 2022-10-01 RX ORDER — METHYLERGONOVINE MALEATE 0.2 MG/ML
200 INJECTION INTRAVENOUS
Status: DISCONTINUED | OUTPATIENT
Start: 2022-10-01 | End: 2022-10-02 | Stop reason: HOSPADM

## 2022-10-01 RX ORDER — HYDROCORTISONE 25 MG/G
CREAM TOPICAL 3 TIMES DAILY PRN
Status: DISCONTINUED | OUTPATIENT
Start: 2022-10-01 | End: 2022-10-02 | Stop reason: HOSPADM

## 2022-10-01 RX ORDER — MISOPROSTOL 200 UG/1
800 TABLET ORAL
Status: DISCONTINUED | OUTPATIENT
Start: 2022-10-01 | End: 2022-10-02 | Stop reason: HOSPADM

## 2022-10-01 RX ORDER — CARBOPROST TROMETHAMINE 250 UG/ML
250 INJECTION, SOLUTION INTRAMUSCULAR
Status: DISCONTINUED | OUTPATIENT
Start: 2022-10-01 | End: 2022-10-02 | Stop reason: HOSPADM

## 2022-10-01 RX ORDER — OXYTOCIN/0.9 % SODIUM CHLORIDE 30/500 ML
340 PLASTIC BAG, INJECTION (ML) INTRAVENOUS CONTINUOUS PRN
Status: DISCONTINUED | OUTPATIENT
Start: 2022-10-01 | End: 2022-10-02 | Stop reason: HOSPADM

## 2022-10-01 RX ORDER — LIDOCAINE HYDROCHLORIDE 10 MG/ML
INJECTION, SOLUTION EPIDURAL; INFILTRATION; INTRACAUDAL; PERINEURAL PRN
Status: DISCONTINUED | OUTPATIENT
Start: 2022-10-01 | End: 2022-10-01

## 2022-10-01 RX ORDER — DOCUSATE SODIUM 100 MG/1
100 CAPSULE, LIQUID FILLED ORAL DAILY
Status: DISCONTINUED | OUTPATIENT
Start: 2022-10-01 | End: 2022-10-02 | Stop reason: HOSPADM

## 2022-10-01 RX ORDER — NALBUPHINE HYDROCHLORIDE 10 MG/ML
2.5-5 INJECTION, SOLUTION INTRAMUSCULAR; INTRAVENOUS; SUBCUTANEOUS EVERY 6 HOURS PRN
Status: DISCONTINUED | OUTPATIENT
Start: 2022-10-01 | End: 2022-10-02 | Stop reason: HOSPADM

## 2022-10-01 RX ORDER — OXYTOCIN 10 [USP'U]/ML
10 INJECTION, SOLUTION INTRAMUSCULAR; INTRAVENOUS
Status: DISCONTINUED | OUTPATIENT
Start: 2022-10-01 | End: 2022-10-02 | Stop reason: HOSPADM

## 2022-10-01 RX ORDER — ONDANSETRON 4 MG/1
4 TABLET, ORALLY DISINTEGRATING ORAL EVERY 6 HOURS PRN
Status: DISCONTINUED | OUTPATIENT
Start: 2022-10-01 | End: 2022-10-01 | Stop reason: HOSPADM

## 2022-10-01 RX ADMIN — BENZOCAINE AND LEVOMENTHOL: 200; 5 SPRAY TOPICAL at 20:04

## 2022-10-01 RX ADMIN — Medication 9 ML: at 03:33

## 2022-10-01 RX ADMIN — LIDOCAINE HYDROCHLORIDE 3 ML: 10 INJECTION, SOLUTION EPIDURAL; INFILTRATION; INTRACAUDAL; PERINEURAL at 03:00

## 2022-10-01 RX ADMIN — Medication: at 03:00

## 2022-10-01 RX ADMIN — IBUPROFEN 800 MG: 800 TABLET ORAL at 20:02

## 2022-10-01 RX ADMIN — DOCUSATE SODIUM 100 MG: 100 CAPSULE, LIQUID FILLED ORAL at 07:52

## 2022-10-01 RX ADMIN — IBUPROFEN 800 MG: 800 TABLET ORAL at 14:57

## 2022-10-01 RX ADMIN — LIDOCAINE HYDROCHLORIDE,EPINEPHRINE BITARTRATE 5 ML: 15; .005 INJECTION, SOLUTION EPIDURAL; INFILTRATION; INTRACAUDAL; PERINEURAL at 03:32

## 2022-10-01 RX ADMIN — SODIUM CHLORIDE, POTASSIUM CHLORIDE, SODIUM LACTATE AND CALCIUM CHLORIDE 1000 ML: 600; 310; 30; 20 INJECTION, SOLUTION INTRAVENOUS at 01:52

## 2022-10-01 RX ADMIN — OXYTOCIN 10 UNITS: 10 INJECTION INTRAVENOUS at 04:03

## 2022-10-01 RX ADMIN — LIDOCAINE HYDROCHLORIDE 20 ML: 10 INJECTION, SOLUTION EPIDURAL; INFILTRATION; INTRACAUDAL; PERINEURAL at 04:19

## 2022-10-01 ASSESSMENT — ACTIVITIES OF DAILY LIVING (ADL)
ADLS_ACUITY_SCORE: 18

## 2022-10-01 NOTE — ANESTHESIA PROCEDURE NOTES
Epidural catheter Procedure Note    Pre-Procedure   Staff -        Anesthesiologist:  Nimesh Valdivia MD       Performed By: anesthesiologist       Location: OB       Procedure Start/Stop Times: 10/1/2022 2:47 AM and 10/1/2022 3:04 AM       Pre-Anesthestic Checklist: patient identified, IV checked, risks and benefits discussed, informed consent, pre-op evaluation and at physician/surgeon's request  Timeout:       Correct Patient: Yes        Correct Procedure: Yes        Correct Site: Yes        Correct Position: Yes   Procedure Documentation  Procedure: epidural catheter       Diagnosis: Labor Analgesia       Patient Position: sitting       Patient Prep/Sterile Barriers: sterile gloves, mask       Skin prep: Chloraprep       Local skin infiltrated with 2 mL of 1% lidocaine.        Insertion Site: L3-4. (midline approach).       Technique: LORT saline        Needle Type: YCD Multimedia       Needle Gauge: 17.        Needle Length (Inches): 3.5        Catheter: 19 G.          Catheter threaded easily.             # of attempts: 1 and  # of redirects:  0    Assessment/Narrative         Paresthesias: No.       Test dose of 3 mL at.         Test dose negative, 3 minutes after injection, for signs of intravascular, subdural, or intrathecal injection.       Insertion/Infusion Method: LORT saline       No aspiration negative for Heme or CSF via Epidural Catheter.    Medication(s) Administered   Medication Administration Time: 10/1/2022 2:47 AM     Comments:  Negative CSF or heme

## 2022-10-01 NOTE — ANESTHESIA PREPROCEDURE EVALUATION
Anesthesia Pre-Procedure Evaluation    Patient: Juan J Burgess   MRN: [id number] : 1995        Procedure : * No procedures listed *          Past Medical History:   Diagnosis Date     Migraines      Palpitation     has felt some palpitations over the last 3 weeks      Past Surgical History:   Procedure Laterality Date     WISDOM TOOTH EXTRACTION  ?      No Known Allergies   Social History     Tobacco Use     Smoking status: Never Smoker     Smokeless tobacco: Never Used   Substance Use Topics     Alcohol use: Yes     Comment: 2 glasses of wine      Wt Readings from Last 1 Encounters:   22 68 kg (150 lb)              OUTSIDE LABS:  CBC:   Lab Results   Component Value Date    WBC 11.8 (H) 2022    WBC 15.2 (H) 2022    HGB 12.2 2022    HGB 12.0 2022    HCT 35.6 2022    HCT 36.6 2022     2022     2022     BMP: No results found for: NA, POTASSIUM, CHLORIDE, CO2, BUN, CR, GLC  COAGS: No results found for: PTT, INR, FIBR  POC:   Lab Results   Component Value Date    HCG Positive (A) 2022     HEPATIC: No results found for: ALBUMIN, PROTTOTAL, ALT, AST, GGT, ALKPHOS, BILITOTAL, BILIDIRECT, CAITLYN  OTHER:   Lab Results   Component Value Date    A1C 5.1 2022    TSH 5.24 (H) 2022    T4 0.87 (L) 2022       Anesthesia Plan    ASA Status:  2      Anesthesia Type: Epidural.              Consents    Anesthesia Plan(s) and associated risks, benefits, and realistic alternatives discussed. Questions answered and patient/representative(s) expressed understanding.     - Discussed: Risks, Benefits and Alternatives for BOTH SEDATION and the PROCEDURE were discussed     - Discussed with:  Patient         Postoperative Care            Comments:    Other Comments: Patient requesting labor epidural, chart reviewed.  Discussed risks including hypotension, nerve damage, infection, and post dural puncture headache.  Patient understands,  questions answered, and agrees to proceed.  Time out instituted prior to placement. Hands washed, sterile gloves and mask worn.            Nimesh Valdivia MD

## 2022-10-01 NOTE — L&D DELIVERY NOTE
Delivery Summary    Juan J Burgess MRN# [id number]   Age: 27 year old YOB: 1995     ASSESSMENT & PLAN: 27 year old   at 41 1/7 gestation after post dated induction.  Hypothyroid.  Anemia.  Will check Hgb in 1 day.  Plan discharge in 1-2 days, depending on how Mom and baby doing.       Jenifer Female-Juan J [[id number]]    Labor Event Times    Dilation complete date: 10/1/22 Complete time:  3:20 AM   Start pushing date/time: 10/1/2022 0336      Labor Length    2nd Stage (hrs): 0 (min): 40   3rd Stage (hrs): 0 (min): 3      Labor Events     labor?: No   steroids: None  Labor Type: Induction/Cervical ripening  Predominate monitoring during 1st stage: continuous electronic fetal monitoring     Antibiotics received during labor?: No     Rupture date/time: 10/1/22 0001   Rupture type: Artificial Rupture of Membranes  Fluid color: Clear  Fluid odor: Normal     Induction: Misoprostol  Induction date/time:     Cervical ripening date/time: 22 171   Indications for induction: Post-term Gestation     Augmentation: AROM  Indications for augmentation: Ineffective Contraction Pattern     Delivery/Placenta Date and Time    Delivery Date: 10/1/22 Delivery Time:  4:00 AM   Placenta Date/Time: 10/1/2022  4:03 AM  Oxytocin given at the time of delivery: after delivery of placenta  Delivering clinician: Margo Courtney MD   Other personnel present at delivery:  Provider Role   Noemy Spain RN [place], Elsa L RN          Vaginal Counts     Initial count performed by 2 team members:  Two Team Members   EDSON Courtney       Needles Suture Needles Sponges (RETIRED) Instruments   Initial counts 1 1 5    Added to count       Relief counts       Final counts 1 1 5          Placed during labor Accounted for at the end of labor   FSE No NA   IUPC No NA   Cervidil No NA              Final count performed by 2 team members:  Two Team Members   EDSON Courtney       Final count correct?: Yes     Apgars    Living status: Living   1 Minute 5 Minute 10 Minute 15 Minute 20 Minute   Skin color: 1  1       Heart rate: 2  2       Reflex irritability: 2  2       Muscle tone: 2  2       Respiratory effort: 1  2       Total: 8  9       Apgars assigned by: EDSON CARBALLO RN     Cord    Vessels: 3 Vessels    Cord Complications: Nuchal   Nuchal Intervention: delivered through         Nuchal cord description: tight nuchal cord         Cord Blood Disposition: Lab    Gases Sent?: No    Delayed cord clamping?: No    Stem cell collection?: No        Resuscitation    Methods: Suctioning  Output in Delivery Room: Voided, Stool     [place] Measurements    Output in delivery room: Voided, Stool     Skin to Skin and Feeding Plan    Skin to skin initiation date/time: 1/10/1841    Skin to skin with: Mother  Skin to skin end date/time:     Breastfeeding initiated date/time: 10/1/2022 041     Labor Events and Shoulder Dystocia    Fetal Tracing Prior to Delivery: Category 1  Shoulder dystocia present?: Neg     Delivery (Maternal) (Provider to Complete) ([id number])    Episiotomy: None  Perineal lacerations: None    Labial laceration: bilateral Repaired?: Yes   Repair suture: 4-0 Vicryl  Genital tract inspection done: Pos     Blood Loss  Mother: Juan J Burgess #[id number]   Start of Mother's Information    Delivery Blood Loss  22 1600 - 10/01/22 0447    Delivery QBL (mL) Hospital Encounter 100 mL    Postpartum QBL (mL) Hospital Encounter 390 mL    Total  490 mL         End of Mother's Information  Mother: Juan J Burgess #[id number]          Delivery - Provider to Complete ([id number])    Delivering clinician: Margo Courtney MD  Attempted Delivery Types (Choose all that apply): Spontaneous Vaginal Delivery  Delivery Type (Choose the 1 that will go to the Birth History): Vaginal, Spontaneous                   Other personnel:  Provider Role   Noemy Carballo, Shira Harris  ANABELLE MONTENEGRO                 Placenta    Date/Time: 10/1/2022  4:03 AM  Removal: Spontaneous  Disposition: Hospital disposal           Anesthesia    Method: Epidural                Presentation and Position    Presentation: Vertex    Position: Right Occiput Anterior                 Margo Courtney MD

## 2022-10-01 NOTE — H&P
[facility] Labor and Delivery History and Physical    Juan J Burgess MRN# [id number]   Age: 27 year old YOB: 1995     Date of Admission:  2022    Primary care provider: Margo Courtney           Chief Complaint:   Juan J Burgess is a 27 year old female who is 41w0d pregnant and being admitted for induction of labor, indication post-dates.          Pregnancy history:     OBSTETRIC HISTORY:    OB History    Para Term  AB Living   2 1 1 0 0 1   SAB IAB Ectopic Multiple Live Births   0 0 0 0 1      # Outcome Date GA Lbr Thomas/2nd Weight Sex Delivery Anes PTL Lv   2 Current            1 Term 17 39w5d / 01:33 2.722 kg (6 lb) M Vag-Spont EPI, Nitrous N NASIR      Name: ORA BURGESS      Apgar1: 5  Apgar5: 9       EDC: Estimated Date of Delivery: 22    Prenatal Labs:   Lab Results   Component Value Date    AS Negative 2022    HEPBANG Nonreactive 2022    CHPCRT Negative 2022    GCPCRT Negative 2022    HGB 12.2 2022       GBS Status:   No results found for: GBS    Active Problem List  Patient Active Problem List   Diagnosis     Acquired hypothyroidism     Hashimoto's disease     History of gestational diabetes mellitus     Irregular periods/menstrual cycles     Polycystic ovaries     Subchorionic hemorrhage     Vitamin D deficiency     Encounter for supervision of other normal pregnancy in third trimester     Pregnancy       Medication Prior to Admission  Medications Prior to Admission   Medication Sig Dispense Refill Last Dose     levothyroxine (SYNTHROID/LEVOTHROID) 150 MCG tablet Take 1 tablet (150 mcg) by mouth daily 30 tablet 3 2022 at Unknown time     prenatal vitamin iron-folic acid 27mg-0.8mg (PRENATAL S) 27 mg iron- 800 mcg Tab tablet [PRENATAL VITAMIN IRON-FOLIC ACID 27MG-0.8MG (PRENATAL S) 27 MG IRON- 800 MCG TAB TABLET] Take 1 tablet by mouth daily.   2022 at Unknown time   .         "Maternal Past Medical History:     Past Medical History:   Diagnosis Date     Migraines      Palpitation     has felt some palpitations over the last 3 weeks                       Family History:   This patient has no significant family history            Social History:   This patient has no significant social history         Review of Systems:   The Review of Systems is negative other than noted in the HPI          Physical Exam:   Vitals were reviewed  /65   Pulse 80   Temp 99.4  F (37.4  C) (Oral)   Resp 16   Ht 1.499 m (4' 11\")   Wt 68 kg (150 lb)   LMP 12/18/2021 (Within Days)   SpO2 97%   BMI 30.30 kg/m      Constitutional:   awake, alert, cooperative, no apparent distress, and appears stated age     Lungs:   No increased work of breathing, good air exchange, clear to auscultation bilaterally, no crackles or wheezing     Cardiovascular:   Normal apical impulse, regular rate and rhythm, normal S1 and S2, no S3 or S4, and no murmur noted     Abdomen:   No scars, normal bowel sounds, soft, non-distended, non-tender, no masses palpated, no hepatosplenomegally, gravid      Cervix:   Membranes: intact   Dilation: 3   Effacement: 70%   Station:-1   Consistency: average   Position: Mid  Presentation:Cephalic  Fetal Heart Rate Tracing: reactive and reassuring  Tocometer: external monitor                       Assessment:   Juan J Burgess is a 41w0d pregnant female admitted with induction of labor, indication post-dates.  Hypothyroid  Anemia          Plan:   Admit - see IP orders  cervical ripening with misoprostol  AROM when safe and able  Patient would like labor epidural when needed      Margo Courtney MD  "

## 2022-10-01 NOTE — ANESTHESIA POSTPROCEDURE EVALUATION
Patient: Juan J Burgess    Procedure: * No procedures listed *       Anesthesia Type:  Epidural    Note:     Postop Pain Control: Uneventful            Sign Out: Well controlled pain   PONV: No   Neuro/Psych: Uneventful            Sign Out: Acceptable/Baseline neuro status   Airway/Respiratory: Uneventful            Sign Out: Acceptable/Baseline resp. status   CV/Hemodynamics: Uneventful            Sign Out: Acceptable CV status; No obvious hypovolemia; No obvious fluid overload   Other NRE: NONE   DID A NON-ROUTINE EVENT OCCUR? No           Last vitals:  Vitals:    10/01/22 0603 10/01/22 0618 10/01/22 1025   BP: 103/57 106/55 107/52   Pulse:   83   Resp:   16   Temp:   37.3  C (99.1  F)   SpO2:          Electronically Signed By: JOSIAS STANLEY MD  October 1, 2022  12:11 PM

## 2022-10-02 VITALS
BODY MASS INDEX: 30.24 KG/M2 | OXYGEN SATURATION: 98 % | WEIGHT: 150 LBS | SYSTOLIC BLOOD PRESSURE: 116 MMHG | RESPIRATION RATE: 16 BRPM | DIASTOLIC BLOOD PRESSURE: 71 MMHG | HEIGHT: 59 IN | HEART RATE: 80 BPM | TEMPERATURE: 97.7 F

## 2022-10-02 LAB — HGB BLD-MCNC: 11.9 G/DL (ref 11.7–15.7)

## 2022-10-02 PROCEDURE — 2894A VOIDCORRECT: CPT | Performed by: FAMILY MEDICINE

## 2022-10-02 PROCEDURE — 36415 COLL VENOUS BLD VENIPUNCTURE: CPT | Performed by: FAMILY MEDICINE

## 2022-10-02 PROCEDURE — 85018 HEMOGLOBIN: CPT | Performed by: FAMILY MEDICINE

## 2022-10-02 PROCEDURE — 250N000013 HC RX MED GY IP 250 OP 250 PS 637: Performed by: FAMILY MEDICINE

## 2022-10-02 RX ADMIN — IBUPROFEN 800 MG: 800 TABLET ORAL at 08:10

## 2022-10-02 RX ADMIN — DOCUSATE SODIUM 100 MG: 100 CAPSULE, LIQUID FILLED ORAL at 08:10

## 2022-10-02 ASSESSMENT — ACTIVITIES OF DAILY LIVING (ADL)
ADLS_ACUITY_SCORE: 18

## 2022-10-02 NOTE — PLAN OF CARE
Discharge paperwork and teaching with patient has been completed. RN gave adequate time to answer questions and patient verbally stated they understand information.    Zoila Mcrae RN

## 2022-10-02 NOTE — DISCHARGE SUMMARY
[facility]    Discharge Summary  Obstetrics    Date of Admission:  2022  Date of Discharge:  10/2/2022  Discharging Provider: Margo Mike MD    Discharge Diagnoses       History of Present Illness   Juan J Burgess is a 27 year old female who presented with induction for post dates    Hospital Course   The patient's hospital course was unremarkable.  She recovered as anticipated and experienced no post-delivery complications.  On discharge, her pain was well controlled. Hgb stable.  Vaginal bleeding is similar to peak menstrual flow.  Voiding without difficulty.  Ambulating well and tolerating a normal diet.  No fevers.  Breastfeeding well.  Infant is stable.  She was discharged on post-partum day #1.    Post-partum hemoglobin:   Hemoglobin   Date Value Ref Range Status   10/02/2022 11.9 11.7 - 15.7 g/dL Final       [place] Depression Scale  Thoughts of Harming Self: Never  Total Score: 1      Margo Mike MD    Discharge Disposition   Discharged to home   Condition at discharge: Stable    Primary Care Physician   Margo Courtney    Consultations This Hospital Stay   CARE MANAGEMENT / SOCIAL WORK IP CONSULT  LACTATION IP CONSULT    Discharge Orders      Breast pump - Manual/Electric    Breast Pump Documentation:  Manual/Electric Pump: To support adequate breast milk production and nutrition for infant.     I, the undersigned, certify that the above prescribed supplies are medically necessary for this patient and is both reasonable and necessary in reference to accepted standards of medical and necessary in reference to accepted standards of medical practice in the treatment of this patient's condition and is not prescribed as a convenience.     Discharge Medications   Current Discharge Medication List      CONTINUE these medications which have NOT CHANGED    Details   levothyroxine (SYNTHROID/LEVOTHROID) 150 MCG tablet Take 1 tablet (150 mcg) by mouth  daily  Qty: 30 tablet, Refills: 3    Associated Diagnoses: Acquired hypothyroidism      prenatal vitamin iron-folic acid 27mg-0.8mg (PRENATAL S) 27 mg iron- 800 mcg Tab tablet [PRENATAL VITAMIN IRON-FOLIC ACID 27MG-0.8MG (PRENATAL S) 27 MG IRON- 800 MCG TAB TABLET] Take 1 tablet by mouth daily.           Allergies   No Known Allergies

## 2022-10-06 ENCOUNTER — MEDICAL CORRESPONDENCE (OUTPATIENT)
Dept: FAMILY MEDICINE | Facility: CLINIC | Age: 27
End: 2022-10-06

## 2022-10-08 ENCOUNTER — HOSPITAL ENCOUNTER (EMERGENCY)
Facility: HOSPITAL | Age: 27
Discharge: HOME | End: 2022-10-08
Attending: EMERGENCY MEDICINE | Admitting: EMERGENCY MEDICINE
Payer: COMMERCIAL

## 2022-10-08 ENCOUNTER — APPOINTMENT (OUTPATIENT)
Dept: ULTRASOUND IMAGING | Facility: HOSPITAL | Age: 27
End: 2022-10-08
Attending: EMERGENCY MEDICINE
Payer: COMMERCIAL

## 2022-10-08 ENCOUNTER — NURSE TRIAGE (OUTPATIENT)
Dept: NURSING | Facility: CLINIC | Age: 27
End: 2022-10-08

## 2022-10-08 VITALS
DIASTOLIC BLOOD PRESSURE: 73 MMHG | WEIGHT: 150 LBS | BODY MASS INDEX: 30.24 KG/M2 | RESPIRATION RATE: 16 BRPM | SYSTOLIC BLOOD PRESSURE: 116 MMHG | HEIGHT: 59 IN | OXYGEN SATURATION: 99 % | TEMPERATURE: 97.1 F | HEART RATE: 71 BPM

## 2022-10-08 DIAGNOSIS — R10.30 LOWER ABDOMINAL PAIN: ICD-10-CM

## 2022-10-08 LAB
ALBUMIN UR-MCNC: NEGATIVE MG/DL
APPEARANCE UR: CLEAR
BASOPHILS # BLD AUTO: 0 10E3/UL (ref 0–0.2)
BASOPHILS NFR BLD AUTO: 0 %
BILIRUB UR QL STRIP: NEGATIVE
COLOR UR AUTO: COLORLESS
EOSINOPHIL # BLD AUTO: 0.6 10E3/UL (ref 0–0.7)
EOSINOPHIL NFR BLD AUTO: 5 %
ERYTHROCYTE [DISTWIDTH] IN BLOOD BY AUTOMATED COUNT: 12.8 % (ref 10–15)
GLUCOSE UR STRIP-MCNC: NEGATIVE MG/DL
HCT VFR BLD AUTO: 40.2 % (ref 35–47)
HGB BLD-MCNC: 13.5 G/DL (ref 11.7–15.7)
HGB UR QL STRIP: ABNORMAL
IMM GRANULOCYTES # BLD: 0.1 10E3/UL
IMM GRANULOCYTES NFR BLD: 0 %
KETONES UR STRIP-MCNC: NEGATIVE MG/DL
LEUKOCYTE ESTERASE UR QL STRIP: ABNORMAL
LYMPHOCYTES # BLD AUTO: 2.9 10E3/UL (ref 0.8–5.3)
LYMPHOCYTES NFR BLD AUTO: 25 %
MCH RBC QN AUTO: 29.3 PG (ref 26.5–33)
MCHC RBC AUTO-ENTMCNC: 33.6 G/DL (ref 31.5–36.5)
MCV RBC AUTO: 87 FL (ref 78–100)
MONOCYTES # BLD AUTO: 0.6 10E3/UL (ref 0–1.3)
MONOCYTES NFR BLD AUTO: 6 %
NEUTROPHILS # BLD AUTO: 7.3 10E3/UL (ref 1.6–8.3)
NEUTROPHILS NFR BLD AUTO: 64 %
NITRATE UR QL: NEGATIVE
NRBC # BLD AUTO: 0 10E3/UL
NRBC BLD AUTO-RTO: 0 /100
PH UR STRIP: 6.5 [PH] (ref 5–7)
PLATELET # BLD AUTO: 327 10E3/UL (ref 150–450)
RBC # BLD AUTO: 4.61 10E6/UL (ref 3.8–5.2)
RBC URINE: 10 /HPF
SP GR UR STRIP: 1.01 (ref 1–1.03)
SQUAMOUS EPITHELIAL: 1 /HPF
UROBILINOGEN UR STRIP-MCNC: <2 MG/DL
WBC # BLD AUTO: 11.5 10E3/UL (ref 4–11)
WBC URINE: 8 /HPF

## 2022-10-08 PROCEDURE — 76856 US EXAM PELVIC COMPLETE: CPT

## 2022-10-08 PROCEDURE — 99284 EMERGENCY DEPT VISIT MOD MDM: CPT | Mod: 25

## 2022-10-08 PROCEDURE — 36415 COLL VENOUS BLD VENIPUNCTURE: CPT | Performed by: EMERGENCY MEDICINE

## 2022-10-08 PROCEDURE — 87086 URINE CULTURE/COLONY COUNT: CPT | Performed by: EMERGENCY MEDICINE

## 2022-10-08 PROCEDURE — 85025 COMPLETE CBC W/AUTO DIFF WBC: CPT | Performed by: EMERGENCY MEDICINE

## 2022-10-08 PROCEDURE — 81001 URINALYSIS AUTO W/SCOPE: CPT | Performed by: EMERGENCY MEDICINE

## 2022-10-08 RX ORDER — KETOROLAC TROMETHAMINE 15 MG/ML
15 INJECTION, SOLUTION INTRAMUSCULAR; INTRAVENOUS ONCE
Status: DISCONTINUED | OUTPATIENT
Start: 2022-10-08 | End: 2022-10-08 | Stop reason: HOSPADM

## 2022-10-08 ASSESSMENT — ACTIVITIES OF DAILY LIVING (ADL)
ADLS_ACUITY_SCORE: 35
ADLS_ACUITY_SCORE: 35

## 2022-10-08 NOTE — TELEPHONE ENCOUNTER
"S: Abdominal pain.  B: 10/1 vaginal birth no complications.  Discharged to home on 10/2.    Around 4:20 after walking up steps from doing laundry had    Severe constant pain in RLQ    \"feels like labor contractions\"    Still have a small amount of bleeding since delivery    Taken Tylenol    Crying while talking to writer.    A: Per protocol to see provider in ED now.  Patient is alone with children.  She will call someone to watch children and bring her into the ED.  R: Protocol and care advice reviewed. Patient verbalized understanding, in agreement with plan, and voiced no further questions. Call back with any new or worsening symptoms, concerns, or questions.    Genna Nevarez RN, MA  [facility] Triage Nurse Advisor    Reason for Disposition    [1] SEVERE pain (e.g., excruciating) AND [2] present > 1 hour    Additional Information    Negative: Shock suspected (e.g., cold/pale/clammy skin, too weak to stand, low BP, rapid pulse)    Negative: Difficult to awaken or acting confused (e.g., disoriented, slurred speech)    Negative: Passed out (i.e., lost consciousness, collapsed and was not responding)    Negative: Sounds like a life-threatening emergency to the triager    Protocols used: ABDOMINAL PAIN - FEMALE-A-AH      "

## 2022-10-08 NOTE — ED TRIAGE NOTES
"     Triage Assessment     Row Name 10/08/22 0229       Triage Assessment (Adult)    Airway WDL WDL       Respiratory WDL    Respiratory WDL WDL       Skin Circulation/Temperature WDL    Skin Circulation/Temperature WDL WDL       Cardiac WDL    Cardiac WDL WDL       Peripheral/Neurovascular WDL    Peripheral Neurovascular WDL WDL       Cognitive/Neuro/Behavioral WDL    Cognitive/Neuro/Behavioral WDL WDL            Patient reports increased abdominal cramping that started at 1620, took Tylenol, now \"hardly any pain\".  Pt denies any other c/o's  "

## 2022-10-09 NOTE — DISCHARGE INSTRUCTIONS
If you have recurrent abdominal pain that persists and has not improved after Tylenol ibuprofen or have other symptoms including fever or vaginal bleeding greater than 1 pad per hour return to the emergency department.  See your primary care physician Dr. Johnston or [facility] in follow-up early next week otherwise.

## 2022-10-09 NOTE — ED NOTES
Attempted to start IV on patient and patient declines. Explained to patient she may have to additional pokes if additional blood work is ordered, patient is aware. Patient declines pain medication at this time, states pain is controlled.

## 2022-10-09 NOTE — ED PROVIDER NOTES
EMERGENCY DEPARTMENT NOTE     Name: Juan J Burgess    Age/Sex: 27 year old female   MRN: [id number]   Evaluation Date & Time:  10/8/2022  5:58 PM    PCP:    Margo Courtney   ED Provider: Kashmir Corbin D.O.       CHIEF COMPLAINT    Abdominal Pain       DIAGNOSIS & DISPOSITION     1. Lower abdominal pain      DISPOSITION: Home    At the conclusion of the encounter I discussed the results of all of the tests and the disposition. The questions were answered. The patient or family acknowledged understanding and was agreeable with the care plan.    TOTAL CRITICAL CARE TIME (EXCLUDING PROCEDURES): Not applicable    PROCEDURES:   None    EMERGENCY DEPARTMENT COURSE/MEDICAL DECISION MAKING   6:50 PM I met with the patient to gather history and to perform my initial exam.  We discussed treatment options and the plan for care while in the Emergency Department.  9:33 PM I rechecked the patient and updated them on laboratory and imaging results.  9:36 PM Spoke with Dr. Pagan, OB/Gyn.  9:42 PM Rechecked and updated the patient. We discussed the plan for discharge and the patient is agreeable. Reviewed supportive cares, symptomatic treatment, outpatient follow up, and reasons to return to the Emergency Department. Patient to be discharged by ED RN.     Juan J Burgess is a 27 year old female with relevant past history of Hashimoto's, polycystic ovaries, and 6 days postpartum vaginal delivery, who presents to the emergency department for evaluation of abdominal pain. The patient is 6 days postpartum, today she was walking up the stairs when she had sudden onset of cramping lower abdominal pain which lasted for 30 minutes.     Triage note reviewed:       Triage Assessment     Row Name 10/08/22 4484       Triage Assessment (Adult)    Airway WDL WDL       Respiratory WDL    Respiratory WDL WDL       Skin Circulation/Temperature WDL    Skin Circulation/Temperature WDL WDL       Cardiac WDL    Cardiac WDL WDL        "Peripheral/Neurovascular WDL    Peripheral Neurovascular WDL WDL       Cognitive/Neuro/Behavioral WDL    Cognitive/Neuro/Behavioral WDL WDL            Patient reports increased abdominal cramping that started at 1620, took Tylenol, now \"hardly any pain\".  Pt denies any other c/o's    Differential  diagnosis  considered included but not limited to retained products of conception, endometritis, appendicitis, urinary tract infection  Vital signs:/73   Pulse 71   Temp 97.1  F (36.2  C) (Temporal)   Resp 16   Ht 1.499 m (4' 11\")   Wt 68 kg (150 lb)   LMP 12/18/2021 (Within Days)   SpO2 99%   BMI 30.30 kg/m    Pertinent physical exam findings:  Abdomen: Soft nontender, positive bowel sounds.  No organomegaly or mass  Diagnostic studies:  Imaging:  US Pelvis Complete without Transvaginal   Final Result   IMPRESSION:   1.  Focal endometrial thickening suspicious for retained products of conception.                  Lab:  Labs Ordered and Resulted from Time of ED Arrival to Time of ED Departure   ROUTINE UA WITH MICROSCOPIC REFLEX TO CULTURE - Abnormal       Result Value    Color Urine Colorless      Appearance Urine Clear      Glucose Urine Negative      Bilirubin Urine Negative      Ketones Urine Negative      Specific Gravity Urine 1.015      Blood Urine >1.0 mg/dL (*)     pH Urine 6.5      Protein Albumin Urine Negative      Urobilinogen Urine <2.0      Nitrite Urine Negative      Leukocyte Esterase Urine 250 Thomas/uL (*)     RBC Urine 10 (*)     WBC Urine 8 (*)     Squamous Epithelials Urine 1     CBC WITH PLATELETS AND DIFFERENTIAL - Abnormal    WBC Count 11.5 (*)     RBC Count 4.61      Hemoglobin 13.5      Hematocrit 40.2      MCV 87      MCH 29.3      MCHC 33.6      RDW 12.8      Platelet Count 327      % Neutrophils 64      % Lymphocytes 25      % Monocytes 6      % Eosinophils 5      % Basophils 0      % Immature Granulocytes 0      NRBCs per 100 WBC 0      Absolute Neutrophils 7.3      Absolute " Lymphocytes 2.9      Absolute Monocytes 0.6      Absolute Eosinophils 0.6      Absolute Basophils 0.0      Absolute Immature Granulocytes 0.1      Absolute NRBCs 0.0     URINE CULTURE      Interventions:  Medical decision making:  Patient's abdominal pain has resolved after ketorolac.  Serial abdominal exams remain nontender.  Urinalysis with out significant bacteria to suggest urinary tract infection.  Abdominal exam not concerning for other process including appendicitis.  Discussed ultrasound findings with Dr. Pagan.there is a 1 cm area and no acute intervention recommended.    Current findings discussed with the patient.  She will continue Tylenol ibuprofen for mild pain.  She is given referral to OB/GYN for follow-up.  Return criteria discussed and if she were to have recurrent abdominal pain not improved with Tylenol ibuprofen, develop fever or have heavy vaginal bleeding will return to the emergency department.    Supplemental history from: N/A    External Record(s) Reviewed: Inpatient Record: Hospital Admission 9/30/22-10/2/22 (see HPI)    Differential Diagnosis: See MDM charting for differential considered.     I performed an independent interpretation of the: N/A    Discussed with radiology regarding test interpretation: N/A    Discussion of management with another provider: OB-Gyn    The following testing was considered but ultimately not selected: CT of the abdomen and pelvis    I considered prescription management with:antibiotics    The patient's care impacted: NA    Consideration of Admission/Observation:Yes    Care significantly affected by Social Determinants of Health including: N/A  ED INTERVENTIONS     Medications   ketorolac (TORADOL) injection 15 mg (0 mg Intravenous Hold 10/8/22 1952)       DISCHARGE MEDICATIONS        Review of your medicines      UNREVIEWED medicines. Ask your doctor about these medicines      Dose / Directions   benzocaine-menthol 20-0.5 % Aero  Commonly known as:  DERMOPLAST  Used for: Normal vaginal delivery      Topical, 4 TIMES DAILY PRN, perineal pain  Quantity: 78 g  Refills: 1     levothyroxine 150 MCG tablet  Commonly known as: SYNTHROID/LEVOTHROID  Used for: Acquired hypothyroidism      Dose: 150 mcg  Take 1 tablet (150 mcg) by mouth daily  Quantity: 30 tablet  Refills: 3     prenatal multivitamin  with iron 28-0.8 MG Tabs      Dose: 1 tablet  [PRENATAL VITAMIN IRON-FOLIC ACID 27MG-0.8MG (PRENATAL S) 27 MG IRON- 800 MCG TAB TABLET] Take 1 tablet by mouth daily.  Refills: 0              INFORMATION SOURCE AND LIMITATIONS    History/Exam limitations: None  Patient information was obtained from: Patient  Use of : N/A    HISTORY OF PRESENT ILLNESS   Juan J Burgess is a 27 year old year old female with a relevant past history of Hashimoto's, polycystic ovaries, and 6 days postpartum vaginal delivery, who presents to this ED by walk in for evaluation of abdominal pain.    Per chart review, the patient was admitted to the OB/Gyn service at [facility] from 22 to 10/2/22 (3 days) for  on 10/1/22. She recovered as anticipated, no post-delivery complications. Pain controlled, hemoglobin stable at discharge. Voiding without difficulty. Vaginal bleeding is similar to peak menstrual flow. She was discharged on post-partum day #1.    The patient is 6 days postpartum. This was her second child. It was a normal vaginal delivery.    The patient reports she was walking up the stairs at home today when she had sudden onset of cramping lower abdominal pain which lasted for about 30 minutes. She had not had any cramps yesterday or the day before. She has not had any abnormal vaginal bleeding or vaginal discharge. She reports feeling chilled last night, but did not have a fever. She denies nausea, vomiting, chest pain, shortness of breath, and any other complaints at this time.        REVIEW OF SYSTEMS:   Constitutional: Negative for  fever. Positive for  "chills.  HENT: Negative for URI symptoms or sore throat.    Cardiac: Negative for  chest pain,palpitations, near syncope or syncope  Respiratory: Negative for cough and shortness of breath.    Gastrointestinal: Negative for nausea, vomiting, constipation, diarrhea, rectal bleeding or melena. Positive for lower abdominal pain.  Genitourinary: Negative for dysuria, flank pain, hematuria, vaginal bleeding, vaginal discharge.   Musculoskeletal: Negative for back pain.   Skin: Negative for  rash  Neurological: Negative for dizziness, headache, syncope, speech difficulty, unilateral weakness or imbalance with walking.   Hematological: Negative for adenopathy. Does not bruise/bleed easily.   Psychiatric/Behavioral: Negative for confusion.       PATIENT HISTORY     Past Medical History:   Diagnosis Date     Migraines      Palpitation      Patient Active Problem List   Diagnosis     Acquired hypothyroidism     Hashimoto's disease     History of gestational diabetes mellitus     Irregular periods/menstrual cycles     Polycystic ovaries     Subchorionic hemorrhage     Vitamin D deficiency     Encounter for supervision of other normal pregnancy in third trimester     Pregnancy     Past Surgical History:   Procedure Laterality Date     WISDOM TOOTH EXTRACTION  2020?     Social Histrory  Smoking:  Alcohol Use:  No Known Allergies      OUTPATIENT MEDICATIONS     New Prescriptions    No medications on file      Vitals:    10/08/22 1753   BP: 116/73   Pulse: 71   Resp: 16   Temp: 97.1  F (36.2  C)   TempSrc: Temporal   SpO2: 99%   Weight: 68 kg (150 lb)   Height: 1.499 m (4' 11\")       Physical Exam   Constitutional: Oriented to person, place, and time. Appears well-developed and well-nourished.   HEENT:    Head: Atraumatic.   Neck: Normal range of motion. Neck supple.   Cardiovascular: Normal rate, regular rhythm and normal heart sounds.    Pulmonary/Chest: Normal effort  and breath sounds normal.   Abdominal: Soft. Bowel sounds " are normal.   Musculoskeletal: Normal range of motion.   Neurological: Alert and oriented to person, place, and time. Normal strength.No sensory deficit. No cranial nerve deficit . Skin: Skin is warm and dry.   Psychiatric: Normal mood and affect. Behavior is normal. Thought content normal.       DIAGNOSTICS    LABORATORY FINDINGS (REVIEWED AND INTERPRETED):  Labs Ordered and Resulted from Time of ED Arrival to Time of ED Departure   ROUTINE UA WITH MICROSCOPIC REFLEX TO CULTURE - Abnormal       Result Value    Color Urine Colorless      Appearance Urine Clear      Glucose Urine Negative      Bilirubin Urine Negative      Ketones Urine Negative      Specific Gravity Urine 1.015      Blood Urine >1.0 mg/dL (*)     pH Urine 6.5      Protein Albumin Urine Negative      Urobilinogen Urine <2.0      Nitrite Urine Negative      Leukocyte Esterase Urine 250 Thomas/uL (*)     RBC Urine 10 (*)     WBC Urine 8 (*)     Squamous Epithelials Urine 1     CBC WITH PLATELETS AND DIFFERENTIAL - Abnormal    WBC Count 11.5 (*)     RBC Count 4.61      Hemoglobin 13.5      Hematocrit 40.2      MCV 87      MCH 29.3      MCHC 33.6      RDW 12.8      Platelet Count 327      % Neutrophils 64      % Lymphocytes 25      % Monocytes 6      % Eosinophils 5      % Basophils 0      % Immature Granulocytes 0      NRBCs per 100 WBC 0      Absolute Neutrophils 7.3      Absolute Lymphocytes 2.9      Absolute Monocytes 0.6      Absolute Eosinophils 0.6      Absolute Basophils 0.0      Absolute Immature Granulocytes 0.1      Absolute NRBCs 0.0     URINE CULTURE         IMAGING (REVIEWED AND INTERPRETED):  US Pelvis Complete without Transvaginal   Final Result   IMPRESSION:   1.  Focal endometrial thickening suspicious for retained products of conception.                       IThierry, am serving as a scribe to document services personally performed by Kashmir Corbin D.O., based on my observation and the provider s statements to me.    I,  Kashmir Corbin D.O., attest that Thierry JARRED Aleman is acting in a scribe capacity, has observed my performance of the services and has documented them in accordance with my direction.    Kashmir Corbin D.O.  EMERGENCY MEDICINE   10/08/22  [facility] EMERGENCY DEPARTMENT  [address] [place] [id number]  [phone]  Dept: [phone]       Kashmir Corbin DO  10/10/22 0036

## 2022-10-10 ENCOUNTER — OFFICE VISIT (OUTPATIENT)
Dept: FAMILY MEDICINE | Facility: CLINIC | Age: 27
End: 2022-10-10
Payer: COMMERCIAL

## 2022-10-10 VITALS
HEART RATE: 84 BPM | OXYGEN SATURATION: 98 % | WEIGHT: 139.1 LBS | BODY MASS INDEX: 28.09 KG/M2 | SYSTOLIC BLOOD PRESSURE: 90 MMHG | DIASTOLIC BLOOD PRESSURE: 56 MMHG | TEMPERATURE: 98.3 F

## 2022-10-10 LAB — BACTERIA UR CULT: NORMAL

## 2022-10-10 PROCEDURE — 99213 OFFICE O/P EST LOW 20 MIN: CPT | Mod: 24 | Performed by: FAMILY MEDICINE

## 2022-10-10 NOTE — PROGRESS NOTES
"  Assessment & Plan     Retained products of conception after delivery without hemorrhage  Vital signs stable.  Afebrile.  Discussed need to follow-up with OB/GYN.  We will assist her in scheduling appointment.               BMI:   Estimated body mass index is 28.09 kg/m  as calculated from the following:    Height as of 10/8/22: 1.499 m (4' 11\").    Weight as of this encounter: 63.1 kg (139 lb 1.6 oz).           No follow-ups on file.    Dottie Wang MD  [facility] ZOHAIB Arita is a 27 year old, presenting for the following health issues:  Hospital F/U (Pain has improved)  She comes in today accompanied by her  for emergency room follow-up.  She is 8 days postpartum.  Had spontaneous vaginal delivery.  Presented to the emergency department for evaluation of abdominal pain on 10/8/2022.  Pain occurred while she was walking up the stairs.  Had sudden onset of cramping lower abdominal pain which lasted for 30 minutes.  Evaluation in the emergency department included CBC which showed slightly elevated white blood cell count.  UA was negative for acute UTI.  Pelvic ultrasound showed findings concerning for retained products of conception.  She was discharged home with instructions to follow-up with her gynecologist and PCP early this week.  She reports that she was not able to get into her PCP.  Scheduled appointment with this provider because this is the only day that her  is available to drive her to appointments.  She reports that she is feeling better.  She is not having any further cramping.  Her bleeding has slowed down and she is spotting.  She does have body aches.  She is breast-feeding.  She is not having dizziness or lightheadedness.  No fevers or chills.  No nausea or vomiting.  She is eating and drinking okay.  No other concerns or questions.    HPI           Review of Systems         Objective    BP 90/56 (BP Location: Right arm, Cuff Size: Adult Regular)  "  Pulse 84   Temp 98.3  F (36.8  C) (Temporal)   Wt 63.1 kg (139 lb 1.6 oz)   LMP 12/18/2021 (Within Days)   SpO2 98%   BMI 28.09 kg/m    Body mass index is 28.09 kg/m .  Physical Exam    GENERAL: healthy, alert and no distress  RESP: lungs clear to auscultation - no rales, rhonchi or wheezes  CV: regular rate and rhythm, normal S1 S2, no S3 or S4, no murmur, click or rub, no peripheral edema and peripheral pulses strong  ABDOMEN: soft, nontender, no hepatosplenomegaly, no masses and bowel sounds normal  PSYCH: mentation appears normal, affect normal/bright

## 2022-10-11 ENCOUNTER — TELEPHONE (OUTPATIENT)
Dept: OBGYN | Facility: CLINIC | Age: 27
End: 2022-10-11

## 2022-10-11 NOTE — TELEPHONE ENCOUNTER
Just called pt and she stated that she no longer needs this appointment.  [facility]  Thanked us for reaching out, but declined appointment at this time.        Maine Heller   Ob/Gyn Clinic  RN

## 2022-10-11 NOTE — TELEPHONE ENCOUNTER
Pt is to follow up with OBGYN re possible retained products of conception on a post partum U/S. Dr Herron is the only [facility] OBGYN in the [facility] and she is booked until 11/15. Can someone from your clinics call pt and get her in to see an OBGYN soon? Pt will be waiting for a call. This message sent to several locations. OK to leave a message. Thank you

## 2022-10-13 ENCOUNTER — ANESTHESIA EVENT (OUTPATIENT)
Dept: SURGERY | Facility: CLINIC | Age: 27
End: 2022-10-13
Payer: COMMERCIAL

## 2022-10-14 ENCOUNTER — ANESTHESIA (OUTPATIENT)
Dept: SURGERY | Facility: CLINIC | Age: 27
End: 2022-10-14
Payer: COMMERCIAL

## 2022-10-14 ENCOUNTER — HOSPITAL ENCOUNTER (OUTPATIENT)
Facility: CLINIC | Age: 27
Discharge: HOME | End: 2022-10-14
Attending: OBSTETRICS & GYNECOLOGY | Admitting: OBSTETRICS & GYNECOLOGY
Payer: COMMERCIAL

## 2022-10-14 VITALS
BODY MASS INDEX: 28.02 KG/M2 | RESPIRATION RATE: 12 BRPM | SYSTOLIC BLOOD PRESSURE: 107 MMHG | OXYGEN SATURATION: 96 % | DIASTOLIC BLOOD PRESSURE: 60 MMHG | WEIGHT: 139 LBS | HEART RATE: 73 BPM | HEIGHT: 59 IN | TEMPERATURE: 99.9 F

## 2022-10-14 PROCEDURE — 272N000001 HC OR GENERAL SUPPLY STERILE: Performed by: OBSTETRICS & GYNECOLOGY

## 2022-10-14 PROCEDURE — 250N000011 HC RX IP 250 OP 636: Performed by: ANESTHESIOLOGY

## 2022-10-14 PROCEDURE — 250N000011 HC RX IP 250 OP 636: Performed by: NURSE ANESTHETIST, CERTIFIED REGISTERED

## 2022-10-14 PROCEDURE — 250N000011 HC RX IP 250 OP 636: Performed by: OBSTETRICS & GYNECOLOGY

## 2022-10-14 PROCEDURE — 999N000141 HC STATISTIC PRE-PROCEDURE NURSING ASSESSMENT: Performed by: OBSTETRICS & GYNECOLOGY

## 2022-10-14 PROCEDURE — 258N000003 HC RX IP 258 OP 636: Performed by: ANESTHESIOLOGY

## 2022-10-14 PROCEDURE — 250N000013 HC RX MED GY IP 250 OP 250 PS 637: Performed by: PHYSICIAN ASSISTANT

## 2022-10-14 PROCEDURE — 710N000012 HC RECOVERY PHASE 2, PER MINUTE: Performed by: OBSTETRICS & GYNECOLOGY

## 2022-10-14 PROCEDURE — 370N000017 HC ANESTHESIA TECHNICAL FEE, PER MIN: Performed by: OBSTETRICS & GYNECOLOGY

## 2022-10-14 PROCEDURE — 250N000009 HC RX 250: Performed by: NURSE ANESTHETIST, CERTIFIED REGISTERED

## 2022-10-14 PROCEDURE — 88305 TISSUE EXAM BY PATHOLOGIST: CPT | Mod: 26 | Performed by: PATHOLOGY

## 2022-10-14 PROCEDURE — 360N000075 HC SURGERY LEVEL 2, PER MIN: Performed by: OBSTETRICS & GYNECOLOGY

## 2022-10-14 PROCEDURE — 88305 TISSUE EXAM BY PATHOLOGIST: CPT | Mod: TC | Performed by: OBSTETRICS & GYNECOLOGY

## 2022-10-14 PROCEDURE — 250N000009 HC RX 250: Performed by: OBSTETRICS & GYNECOLOGY

## 2022-10-14 RX ORDER — DOXYCYCLINE 100 MG/1
100 CAPSULE ORAL ONCE
Status: DISCONTINUED | OUTPATIENT
Start: 2022-10-14 | End: 2022-10-14

## 2022-10-14 RX ORDER — KETOROLAC TROMETHAMINE 30 MG/ML
INJECTION, SOLUTION INTRAMUSCULAR; INTRAVENOUS PRN
Status: DISCONTINUED | OUTPATIENT
Start: 2022-10-14 | End: 2022-10-14

## 2022-10-14 RX ORDER — ACETAMINOPHEN 325 MG/1
975 TABLET ORAL ONCE
Status: COMPLETED | OUTPATIENT
Start: 2022-10-14 | End: 2022-10-14

## 2022-10-14 RX ORDER — OXYCODONE HYDROCHLORIDE 5 MG/1
5-10 TABLET ORAL EVERY 4 HOURS PRN
Qty: 10 TABLET | Refills: 0 | Status: SHIPPED | OUTPATIENT
Start: 2022-10-14 | End: 2022-11-16

## 2022-10-14 RX ORDER — FENTANYL CITRATE 50 UG/ML
INJECTION, SOLUTION INTRAMUSCULAR; INTRAVENOUS PRN
Status: DISCONTINUED | OUTPATIENT
Start: 2022-10-14 | End: 2022-10-14

## 2022-10-14 RX ORDER — ONDANSETRON 2 MG/ML
INJECTION INTRAMUSCULAR; INTRAVENOUS PRN
Status: DISCONTINUED | OUTPATIENT
Start: 2022-10-14 | End: 2022-10-14

## 2022-10-14 RX ORDER — ONDANSETRON 4 MG/1
4 TABLET, ORALLY DISINTEGRATING ORAL EVERY 30 MIN PRN
Status: DISCONTINUED | OUTPATIENT
Start: 2022-10-14 | End: 2022-10-14 | Stop reason: HOSPADM

## 2022-10-14 RX ORDER — FENTANYL CITRATE 50 UG/ML
100 INJECTION, SOLUTION INTRAMUSCULAR; INTRAVENOUS ONCE
Status: DISCONTINUED | OUTPATIENT
Start: 2022-10-14 | End: 2022-10-14 | Stop reason: HOSPADM

## 2022-10-14 RX ORDER — FENTANYL CITRATE 50 UG/ML
25 INJECTION, SOLUTION INTRAMUSCULAR; INTRAVENOUS EVERY 5 MIN PRN
Status: DISCONTINUED | OUTPATIENT
Start: 2022-10-14 | End: 2022-10-14 | Stop reason: HOSPADM

## 2022-10-14 RX ORDER — LIDOCAINE HYDROCHLORIDE 10 MG/ML
INJECTION, SOLUTION INFILTRATION; PERINEURAL PRN
Status: DISCONTINUED | OUTPATIENT
Start: 2022-10-14 | End: 2022-10-14

## 2022-10-14 RX ORDER — CEFAZOLIN SODIUM/WATER 2 G/20 ML
2 SYRINGE (ML) INTRAVENOUS ONCE
Status: COMPLETED | OUTPATIENT
Start: 2022-10-14 | End: 2022-10-14

## 2022-10-14 RX ORDER — MAGNESIUM HYDROXIDE 1200 MG/15ML
LIQUID ORAL PRN
Status: DISCONTINUED | OUTPATIENT
Start: 2022-10-14 | End: 2022-10-14 | Stop reason: HOSPADM

## 2022-10-14 RX ORDER — LIDOCAINE HYDROCHLORIDE 10 MG/ML
INJECTION, SOLUTION EPIDURAL; INFILTRATION; INTRACAUDAL; PERINEURAL PRN
Status: DISCONTINUED | OUTPATIENT
Start: 2022-10-14 | End: 2022-10-14 | Stop reason: HOSPADM

## 2022-10-14 RX ORDER — FENTANYL CITRATE 50 UG/ML
25 INJECTION, SOLUTION INTRAMUSCULAR; INTRAVENOUS
Status: DISCONTINUED | OUTPATIENT
Start: 2022-10-14 | End: 2022-10-14 | Stop reason: HOSPADM

## 2022-10-14 RX ORDER — LIDOCAINE 40 MG/G
CREAM TOPICAL
Status: DISCONTINUED | OUTPATIENT
Start: 2022-10-14 | End: 2022-10-14 | Stop reason: HOSPADM

## 2022-10-14 RX ORDER — PROPOFOL 10 MG/ML
INJECTION, EMULSION INTRAVENOUS CONTINUOUS PRN
Status: DISCONTINUED | OUTPATIENT
Start: 2022-10-14 | End: 2022-10-14

## 2022-10-14 RX ORDER — IBUPROFEN 400 MG/1
800 TABLET, FILM COATED ORAL ONCE
Status: DISCONTINUED | OUTPATIENT
Start: 2022-10-14 | End: 2022-10-14 | Stop reason: HOSPADM

## 2022-10-14 RX ORDER — ONDANSETRON 2 MG/ML
4 INJECTION INTRAMUSCULAR; INTRAVENOUS EVERY 30 MIN PRN
Status: DISCONTINUED | OUTPATIENT
Start: 2022-10-14 | End: 2022-10-14 | Stop reason: HOSPADM

## 2022-10-14 RX ORDER — ACETAMINOPHEN 325 MG/1
975 TABLET ORAL ONCE
Status: DISCONTINUED | OUTPATIENT
Start: 2022-10-14 | End: 2022-10-14 | Stop reason: HOSPADM

## 2022-10-14 RX ORDER — SODIUM CHLORIDE, SODIUM LACTATE, POTASSIUM CHLORIDE, CALCIUM CHLORIDE 600; 310; 30; 20 MG/100ML; MG/100ML; MG/100ML; MG/100ML
INJECTION, SOLUTION INTRAVENOUS CONTINUOUS
Status: DISCONTINUED | OUTPATIENT
Start: 2022-10-14 | End: 2022-10-14 | Stop reason: HOSPADM

## 2022-10-14 RX ORDER — OXYCODONE HYDROCHLORIDE 5 MG/1
5 TABLET ORAL EVERY 4 HOURS PRN
Status: DISCONTINUED | OUTPATIENT
Start: 2022-10-14 | End: 2022-10-14 | Stop reason: HOSPADM

## 2022-10-14 RX ORDER — DEXAMETHASONE SODIUM PHOSPHATE 10 MG/ML
INJECTION, SOLUTION INTRAMUSCULAR; INTRAVENOUS PRN
Status: DISCONTINUED | OUTPATIENT
Start: 2022-10-14 | End: 2022-10-14

## 2022-10-14 RX ORDER — FENTANYL CITRATE 50 UG/ML
100 INJECTION, SOLUTION INTRAMUSCULAR; INTRAVENOUS
Status: DISCONTINUED | OUTPATIENT
Start: 2022-10-14 | End: 2022-10-14 | Stop reason: HOSPADM

## 2022-10-14 RX ORDER — HYDROMORPHONE HCL IN WATER/PF 6 MG/30 ML
0.2 PATIENT CONTROLLED ANALGESIA SYRINGE INTRAVENOUS EVERY 5 MIN PRN
Status: DISCONTINUED | OUTPATIENT
Start: 2022-10-14 | End: 2022-10-14 | Stop reason: HOSPADM

## 2022-10-14 RX ORDER — MEPERIDINE HYDROCHLORIDE 25 MG/ML
12.5 INJECTION INTRAMUSCULAR; INTRAVENOUS; SUBCUTANEOUS
Status: DISCONTINUED | OUTPATIENT
Start: 2022-10-14 | End: 2022-10-14 | Stop reason: HOSPADM

## 2022-10-14 RX ADMIN — PROPOFOL 150 MCG/KG/MIN: 10 INJECTION, EMULSION INTRAVENOUS at 07:43

## 2022-10-14 RX ADMIN — KETOROLAC TROMETHAMINE 15 MG: 30 INJECTION, SOLUTION INTRAMUSCULAR at 08:03

## 2022-10-14 RX ADMIN — FENTANYL CITRATE 50 MCG: 50 INJECTION, SOLUTION INTRAMUSCULAR; INTRAVENOUS at 07:48

## 2022-10-14 RX ADMIN — ACETAMINOPHEN 975 MG: 325 TABLET, FILM COATED ORAL at 06:54

## 2022-10-14 RX ADMIN — MIDAZOLAM 2 MG: 1 INJECTION INTRAMUSCULAR; INTRAVENOUS at 07:41

## 2022-10-14 RX ADMIN — Medication 2 G: at 07:40

## 2022-10-14 RX ADMIN — LIDOCAINE HYDROCHLORIDE 4 ML: 10 INJECTION, SOLUTION INFILTRATION; PERINEURAL at 07:43

## 2022-10-14 RX ADMIN — FENTANYL CITRATE 50 MCG: 50 INJECTION, SOLUTION INTRAMUSCULAR; INTRAVENOUS at 07:43

## 2022-10-14 RX ADMIN — DEXAMETHASONE SODIUM PHOSPHATE 10 MG: 10 INJECTION, SOLUTION INTRAMUSCULAR; INTRAVENOUS at 07:55

## 2022-10-14 RX ADMIN — ONDANSETRON 4 MG: 2 INJECTION INTRAMUSCULAR; INTRAVENOUS at 07:43

## 2022-10-14 RX ADMIN — SODIUM CHLORIDE, POTASSIUM CHLORIDE, SODIUM LACTATE AND CALCIUM CHLORIDE: 600; 310; 30; 20 INJECTION, SOLUTION INTRAVENOUS at 06:50

## 2022-10-14 ASSESSMENT — ACTIVITIES OF DAILY LIVING (ADL)
ADLS_ACUITY_SCORE: 35
ADLS_ACUITY_SCORE: 35

## 2022-10-14 NOTE — ANESTHESIA CARE TRANSFER NOTE
Patient: Juan J Burgess    Procedure: Procedure(s):  SUCTION DILATION AND CURETTAGE       Diagnosis: Delayed and secondary postpartum hemorrhage [O72.2]  Diagnosis Additional Information: No value filed.    Anesthesia Type:   MAC     Note:    Oropharynx: oropharynx clear of all foreign objects  Level of Consciousness: awake  Oxygen Supplementation: room air    Independent Airway: airway patency satisfactory and stable  Dentition: dentition unchanged  Vital Signs Stable: post-procedure vital signs reviewed and stable  Report to RN Given: handoff report given  Patient transferred to: Phase II    Handoff Report: Identifed the Patient, Identified the Reponsible Provider, Reviewed the pertinent medical history, Discussed the surgical course, Reviewed Intra-OP anesthesia mangement and issues during anesthesia, Set expectations for post-procedure period and Allowed opportunity for questions and acknowledgement of understanding      Vitals:  Vitals Value Taken Time   /60 10/14/22 0816   Temp 37.7  C (99.9  F) 10/14/22 0814   Pulse 75 10/14/22 0816   Resp 12 10/14/22 0814   SpO2 97 % 10/14/22 0816   Vitals shown include unvalidated device data.    Electronically Signed By: BLANCA Serna CRNA  October 14, 2022  8:18 AM

## 2022-10-14 NOTE — ANESTHESIA POSTPROCEDURE EVALUATION
Patient: Juan J Burgess    Procedure: Procedure(s):  SUCTION DILATION AND CURETTAGE       Anesthesia Type:  MAC    Note:  Disposition: Outpatient   Postop Pain Control: Uneventful            Sign Out: Well controlled pain   PONV: No   Neuro/Psych: Uneventful            Sign Out: Acceptable/Baseline neuro status   Airway/Respiratory: Uneventful            Sign Out: Acceptable/Baseline resp. status   CV/Hemodynamics: Uneventful            Sign Out: Acceptable CV status; No obvious hypovolemia; No obvious fluid overload   Other NRE: NONE   DID A NON-ROUTINE EVENT OCCUR? No           Last vitals:  Vitals Value Taken Time   /60 10/14/22 0830   Temp 37.7  C (99.9  F) 10/14/22 0814   Pulse 65 10/14/22 0854   Resp 12 10/14/22 0830   SpO2 96 % 10/14/22 0921   Vitals shown include unvalidated device data.    Electronically Signed By: Rohan Xiong MD  October 14, 2022  11:02 AM

## 2022-10-14 NOTE — ANESTHESIA PREPROCEDURE EVALUATION
Anesthesia Pre-Procedure Evaluation    Patient: Juan J Burgess   MRN: [id number] : 1995        Procedure : Procedure(s):  SUCTION DILATION AND CURETTAGE          Past Medical History:   Diagnosis Date     Migraines      Palpitation     has felt some palpitations over the last 3 weeks      Past Surgical History:   Procedure Laterality Date     WISDOM TOOTH EXTRACTION  ?      No Known Allergies   Social History     Tobacco Use     Smoking status: Never     Smokeless tobacco: Never   Substance Use Topics     Alcohol use: Yes     Comment: 2 glasses of wine      Wt Readings from Last 1 Encounters:   10/14/22 63 kg (139 lb)        Anesthesia Evaluation            ROS/MED HX  ENT/Pulmonary:  - neg pulmonary ROS     Neurologic:  - neg neurologic ROS     Cardiovascular:  - neg cardiovascular ROS     METS/Exercise Tolerance:     Hematologic:  - neg hematologic  ROS     Musculoskeletal:  - neg musculoskeletal ROS     GI/Hepatic:  - neg GI/hepatic ROS     Renal/Genitourinary:  - neg Renal ROS     Endo:     (+) thyroid problem,     Psychiatric/Substance Use:  - neg psychiatric ROS     Infectious Disease:  - neg infectious disease ROS     Malignancy:  - neg malignancy ROS     Other:  - neg other ROS          Physical Exam    Airway  airway exam normal      Mallampati: II       Respiratory Devices and Support         Dental  no notable dental history         Cardiovascular   cardiovascular exam normal       Rhythm and rate: regular and normal     Pulmonary   pulmonary exam normal        breath sounds clear to auscultation           OUTSIDE LABS:  CBC:   Lab Results   Component Value Date    WBC 11.5 (H) 10/08/2022    WBC 11.8 (H) 2022    HGB 13.5 10/08/2022    HGB 11.9 10/02/2022    HCT 40.2 10/08/2022    HCT 35.6 2022     10/08/2022     10/01/2022     BMP: No results found for: NA, POTASSIUM, CHLORIDE, CO2, BUN, CR, GLC  COAGS: No results found for: PTT, INR, FIBR  POC:   Lab Results    Component Value Date    HCG Positive (A) 01/24/2022     HEPATIC: No results found for: ALBUMIN, PROTTOTAL, ALT, AST, GGT, ALKPHOS, BILITOTAL, BILIDIRECT, CAITLYN  OTHER:   Lab Results   Component Value Date    A1C 5.1 07/06/2022    TSH 5.24 (H) 09/26/2022    T4 0.87 (L) 09/26/2022       Anesthesia Plan    ASA Status:  2      Anesthesia Type: MAC.   Induction: Intravenous, Propofol.   Maintenance: TIVA.        Consents    Anesthesia Plan(s) and associated risks, benefits, and realistic alternatives discussed. Questions answered and patient/representative(s) expressed understanding.    - Discussed:     - Discussed with:  Patient      - Extended Intubation/Ventilatory Support Discussed: No.      - Patient is DNR/DNI Status: No    Use of blood products discussed: No .     Postoperative Care    Pain management: IV analgesics.   PONV prophylaxis: Ondansetron (or other 5HT-3), Dexamethasone or Solumedrol     Comments:    Other Comments: The patient understands and accepts the risks of MAC anesthesia including (but not limited to) nausea, vomiting, dizziness, and chipped teeth. I also discussed the possibility of conversion to GAETT/GALMA anesthesia which include hoarse voice, sore throat, and pinched lip or chipped teeth.  Versed/fent  propofol ggt  Decadron/zofran            Rohan Xiong MD

## 2022-10-14 NOTE — H&P
HISTORY and PHYSICAL    NAME:Juan J Burgess  : 1995  MRN: [id number]  GESTATIONAL AGE: 41w1d     [facility]    ADMISSION DATE: 10/14/2022    PCP:  Margo Courtney     CHIEF COMPLAINT: Retained products of conception    HPI:  Juan J Burgess delivered on 10/1/22 at [facility] but was then presented to the ER with heavy bleeding. Imaging suggested POC.     DIAGNOSIS COMPLICATING PREGNANCY:    Retained POC    OBSTETRICAL HISTORY:       PAST MEDICAL HISTORY:  Past Medical History:   Diagnosis Date     Migraines      Palpitation     has felt some palpitations over the last 3 weeks       PAST SURGICAL HISTORY:  Past Surgical History:   Procedure Laterality Date     WISDOM TOOTH EXTRACTION  ?       SOCIAL HISTORY:  Social History     Socioeconomic History     Marital status:      Spouse name: Not on file     Number of children: Not on file     Years of education: Not on file     Highest education level: Not on file   Occupational History     Not on file   Tobacco Use     Smoking status: Never     Smokeless tobacco: Never   Vaping Use     Vaping Use: Never used   Substance and Sexual Activity     Alcohol use: Yes     Comment: 2 glasses of wine     Drug use: No     Sexual activity: Yes     Partners: Male     Birth control/protection: None   Other Topics Concern     Not on file   Social History Narrative     Not on file     Social Determinants of Health     Financial Resource Strain: Not on file   Food Insecurity: Not on file   Transportation Needs: Not on file   Physical Activity: Not on file   Stress: Not on file   Social Connections: Not on file   Intimate Partner Violence: Not on file   Housing Stability: Not on file       MEDICATIONS:  Current Facility-Administered Medications   Medication     ceFAZolin Sodium (ANCEF) injection 2 g     fentaNYL (PF) (SUBLIMAZE) injection 100 mcg     fentaNYL (PF) (SUBLIMAZE) injection 100 mcg     lactated ringers infusion     lidocaine  "(LMX4) cream     lidocaine 1 % 0.1-1 mL     midazolam (VERSED) injection 2 mg     midazolam (VERSED) injection 2 mg     Provider ordered ALTERNATE pre op antibiotic.     sodium chloride (PF) 0.9% PF flush 3 mL     sodium chloride (PF) 0.9% PF flush 3 mL       ALLERGIES:  No Known Allergies    REVIEW OF SYSTEMS:  Negative except what is stated in the HPI    PHYSICAL EXAM :  /61   Pulse 82   Temp 98.2  F (36.8  C) (Temporal)   Resp 16   Ht 1.499 m (4' 11\")   Wt 63 kg (139 lb)   LMP 12/18/2021 (Within Days)   SpO2 99%   Breastfeeding Yes   BMI 28.07 kg/m     General Appearance: Alert, appropriate appearance for age. No acute distress,   HEENT: Grossly normal.,   Chest/Respiratory: Normal chest wall and respirations. Clear to auscultation.  Cardiovascular: Regular rate and rhythm  Gastrointestinal: Soft,NT  Fetal Heart Tones: no fetal heart tones noted  Vaginal Exam: Deferred  Musculoskeletal: moving all four extremities without difficulty  Skin: no rash or abnormalities,   Neurologic: Normal gait and speech,    Psychiatric: Alert and oriented, appropriate affect.      LABS:  Lab Results   Component Value Date    HGB 13.5 10/08/2022     @LABRSLTOB(ABORH EXT,LN-ABORH,HML ABO/RH,HGB EXT,HGB,RUBELLA EXT,LN-RUBELLA IGG ANTIBODY:Last:1)@   Lab Results: personally reviewed.     IMAGING:  I have reviewed the radiologists interpretation.    IMPRESSION:  41w1d miscarriage       RECOMMENDATIONS:    Treatment methods were discussed with the patient, expectant management vs dilation and curettage, including risks verses benefits of both   Patient has elected proceeding with dilation and curettage  None      Jerrod Bliss MD       CC: Margo Courtney, Jerrod Bliss MD    "

## 2022-10-14 NOTE — DISCHARGE INSTRUCTIONS
Anesthesia: Monitored Anesthesia Care (MAC)    You re due to have surgery. During surgery, you ll be given medicine called anesthesia. This will keep you comfortable and pain-free. Your surgeon will use monitored anesthesia care (MAC). This sheet tells you more about this type of anesthesia.   What is monitored anesthesia care?  MAC keeps you very drowsy during surgery. You may be awake, but you will likely not remember much. And you won t feel pain. With MAC, medicines are given through an IV line into a vein in your arm or hand. A local anesthetic will usually be injected into the skin and muscle around the surgical site to numb it. The anesthesia provider monitors you during the procedure. He or she checks your heart rate and rhythm, blood pressure, and blood oxygen level.   Anesthesia tools and medicines that may be near you during your procedure  You will likely have:  A pulse oximeter on the end of your finger. This measures your blood oxygen level.  Electrocardiography leads (electrodes) on your chest. These record your heart rate and rhythm.  Medicines given through an IV. These relax you and prevent pain. You may be awake or sleep lightly. If you have local anesthetic, it's injected directly into your skin.  A face mask to give you oxygen, if needed.  Possible risks  MAC has some risks. These include:  Breathing problems  Nausea and vomiting  Allergic reaction to the anesthetic   Anesthesia safety  Tips for anesthesia safety include the following:   Follow all instructions you are given for how long not to eat or drink before your procedure.  Be sure your healthcare provider knows what medicines you take, especially any anti-inflammatory medicine or blood thinners. This includes aspirin and any other over-the-counter medicines, herbs, and supplements.  Have an adult family member or friend drive you home after the procedure.  For the first 24 hours after your surgery:  Don't drive or use heavy  equipment.  Don't make important decisions or sign documents.  Don't drink alcohol.  Have someone stay with you, if possible. They can watch for problems and help keep you safe.  [employer] last reviewed this educational content on 10/1/2019    4430-3869 [employer]. All rights reserved. This information is not intended as a substitute for professional medical care. Always follow your healthcare professional's instructions.

## 2022-10-14 NOTE — OP NOTE
PROCEDURE NOTE - SUCTION D & C    NAME: Juan J Burgess  : 1995  MRN: [id number]     DATE OF SERVICE: 10/14/2022     PREOPERATIVE DIAGNOSIS: Retained Products of conception    POSTOPERATIVE DIAGNOSIS: Same    PROCEDURE: Suction dilatation and curettage    SURGEON: Jerrod Bliss MD    ANESTHESIA: MAC    ESTIMATED BLOOD LOSS: 30cc    SPECIMENS: Uterine contents, sent to pathology    COMPLICATIONS: None.     HISTORY OF PRESENT ILLNESS:  This is a 27 year old female with a known missed . The risks, benefits and alternatives to the procedure were discussed with her at length.  She expressed understanding and wished to proceed.     PROCEDURE NOTE:  Patient was brought to the operating room and after induction of anesthesia was prepped and draped in the dorsal lithotomy position.  A time out was called and the patient and the procedure were verified.  A bimanual exam was done.  Uterus was noted to be 12 weeks gestation. The bladder was drained of urine.  A sterile speculum was placed.  The anterior lip of the cervix was grasped with a single tooth tenaculum.  Mary cervical dilators were used to dilate the cervix.  A # 10 suction curette was induced and rotated to clear the uterus of all products of conception.  A sharp curette was then introduced. Once it was felt that all tissue was removed from all quadrants a decision was made to terminate the procedure. Sponge and instrument counts were correct. Patient tolerated the procedure well and was taken to the recovery room in good condition.      Jerrod Bliss MD      CC: Margo Courtney, Jerrod Bliss MD

## 2022-10-17 LAB
PATH REPORT.COMMENTS IMP SPEC: NORMAL
PATH REPORT.FINAL DX SPEC: NORMAL
PATH REPORT.GROSS SPEC: NORMAL
PATH REPORT.MICROSCOPIC SPEC OTHER STN: NORMAL
PATH REPORT.RELEVANT HX SPEC: NORMAL
PHOTO IMAGE: NORMAL

## 2022-11-16 ENCOUNTER — PRENATAL OFFICE VISIT (OUTPATIENT)
Dept: FAMILY MEDICINE | Facility: CLINIC | Age: 27
End: 2022-11-16
Payer: COMMERCIAL

## 2022-11-16 VITALS
BODY MASS INDEX: 28.14 KG/M2 | HEART RATE: 73 BPM | SYSTOLIC BLOOD PRESSURE: 105 MMHG | DIASTOLIC BLOOD PRESSURE: 60 MMHG | RESPIRATION RATE: 16 BRPM | HEIGHT: 58 IN | WEIGHT: 134.06 LBS | TEMPERATURE: 97.2 F | OXYGEN SATURATION: 99 %

## 2022-11-16 DIAGNOSIS — N76.0 VAGINITIS AND VULVOVAGINITIS: ICD-10-CM

## 2022-11-16 DIAGNOSIS — E55.9 VITAMIN D DEFICIENCY: ICD-10-CM

## 2022-11-16 DIAGNOSIS — E03.9 ACQUIRED HYPOTHYROIDISM: ICD-10-CM

## 2022-11-16 LAB
CLUE CELLS: NORMAL
HGB BLD-MCNC: 12.8 G/DL (ref 11.7–15.7)
T4 FREE SERPL-MCNC: 2.33 NG/DL (ref 0.9–1.7)
TRICHOMONAS, WET PREP: NORMAL
TSH SERPL DL<=0.005 MIU/L-ACNC: 0.02 UIU/ML (ref 0.3–4.2)
WBC'S/HIGH POWER FIELD, WET PREP: NORMAL
YEAST, WET PREP: NORMAL

## 2022-11-16 PROCEDURE — 36415 COLL VENOUS BLD VENIPUNCTURE: CPT | Performed by: FAMILY MEDICINE

## 2022-11-16 PROCEDURE — G0145 SCR C/V CYTO,THINLAYER,RESCR: HCPCS | Performed by: FAMILY MEDICINE

## 2022-11-16 PROCEDURE — 84439 ASSAY OF FREE THYROXINE: CPT | Performed by: FAMILY MEDICINE

## 2022-11-16 PROCEDURE — 84443 ASSAY THYROID STIM HORMONE: CPT | Performed by: FAMILY MEDICINE

## 2022-11-16 PROCEDURE — 99207 PR POST PARTUM EXAM: CPT | Performed by: FAMILY MEDICINE

## 2022-11-16 PROCEDURE — 87210 SMEAR WET MOUNT SALINE/INK: CPT | Performed by: FAMILY MEDICINE

## 2022-11-16 PROCEDURE — 82306 VITAMIN D 25 HYDROXY: CPT | Performed by: FAMILY MEDICINE

## 2022-11-16 PROCEDURE — 85018 HEMOGLOBIN: CPT | Performed by: FAMILY MEDICINE

## 2022-11-16 NOTE — PROGRESS NOTES
Juan J is here for a 6-week postpartum checkup.    Delivery date was 10-1-22. She had a  of a viable girl, weight 7 pounds 1 oz., with none complications.  Since delivery, she has been breast feeding.  She has no signs of infection, bleeding or other complications.  She is not pregnant.  We discussed contraceptions and she has chosen none.  She  has not had intercourse since delivery and complains of No discomfort.  Had bilataeral labila alaceration and heled well.    Hypothyroid: Replacement and due for blood work today.    Retained placenta: Patient end up having a retained placenta and had a D&C on 10/14/2022.  There is no signs of heavy bleeding after delivery and this was diagnosed about 2 weeks after delivery.    Constipation:  Since D and C.  She only took 1 pill of oxycodone.    Vitamin D deficiency: Agreeable to blood work.    Vaginal itching: Agreeable to a wet prep.    EXAM:  ABDOMEN: soft, nontender, without hepatosplenomegaly or masses  BREAST: normal without masses, tenderness or nipple discharge and no palpable axillary masses or adenopathy  PELVIC: external genitalia normal, cervix clean, vagina clean and well supported, fundus involuted, anterior normal, adenxa without masses or tenderness, rectal vaginal exam normal    ASSESSMENT:   Normal postpartum exam after .    ICD-10-CM    1. Routine postpartum follow-up  Z39.2 Pap screen reflex to HPV if ASCUS - recommend age 25 - 29     Hemoglobin     Hemoglobin     CANCELED: TSH with free T4 reflex     CANCELED: Wet prep - lab collect      2. Vitamin D deficiency  E55.9 Vitamin D Deficiency     Vitamin D Deficiency      3. Acquired hypothyroidism  E03.9 TSH with free T4 reflex     TSH with free T4 reflex     TSH with free T4 reflex      4. Vaginitis and vulvovaginitis  N76.0 Wet prep - lab collect     Wet prep - lab collect            PLAN:  Return as needed or at time of next expected pap, pelvic, or breast exam.  Checking thyroid function and  hemoglobin today.    If you wish birth control I can prescribe the progesterone only birth control pill.    If you are not on birth control recommend condoms for birth control.    Continue your prenatal vitamin daily.    Recommend up to 30 g of fiber per day.  Could add on Colace 100-400 mg daily as needed for constipation.  Could add MiraLAX nightly as directed if needed.    Options for fiber can be Benefiber as it mixes clear liquid or you can use fiber Gummies but you can get more fiber in the Benefiber.  Yes you can take the Benefiber and the Colace and the MiraLAX together but you may want to start with Beneiber then add Colace and then  MiraLAX.      The generic form of Colace is docusate sodium.  There is also generic for MiraLAX I believe it is polyethylene glycol.    If you are on all 3 of the above products and you becoming regular with your bowel movements you could back off the MiraLAX if still regular then can back off the Colace.  A lot of people like to continue Benefiber daily as we do not usually get enough fiber in her diet.    If it anytime you are having loose stool then back off all the products.      I am entering a standing order for thyroid labs.  You can set a lab appointment at 3 months at any of our clinics.  If it is stable at 3 months you could check it in 3 to 6 months.  We can check it as often as we need.    I can see you in 1 year for physical.    If you do decide to have another pregnancy I am happy to follow you for that.

## 2022-11-16 NOTE — PATIENT INSTRUCTIONS
Checking thyroid function and hemoglobin today.    If you wish birth control I can prescribe the progesterone only birth control pill.    If you are not on birth control recommend condoms for birth control.    Continue your prenatal vitamin daily.    Recommend up to 30 g of fiber per day.  Could add on Colace 100-400 mg daily as needed for constipation.  Could add MiraLAX nightly as directed if needed.    Options for fiber can be Benefiber as it mixes clear liquid or you can use fiber Gummies but you can get more fiber in the Benefiber.  Yes you can take the Benefiber and the Colace and the MiraLAX together but you may want to start with Beneiber then add Colace and then  MiraLAX.      The generic form of Colace is docusate sodium.  There is also generic for MiraLAX I believe it is polyethylene glycol.    If you are on all 3 of the above products and you becoming regular with your bowel movements you could back off the MiraLAX if still regular then can back off the Colace.  A lot of people like to continue Benefiber daily as we do not usually get enough fiber in her diet.    If it anytime you are having loose stool then back off all the products.      I am entering a standing order for thyroid labs.  You can set a lab appointment at 3 months at any of our clinics.  If it is stable at 3 months you could check it in 3 to 6 months.  We can check it as often as we need.    I can see you in 1 year for physical.    If you do decide to have another pregnancy I am happy to follow you for that.

## 2022-11-17 DIAGNOSIS — E03.9 ACQUIRED HYPOTHYROIDISM: Primary | ICD-10-CM

## 2022-11-17 LAB — DEPRECATED CALCIDIOL+CALCIFEROL SERPL-MC: 38 UG/L (ref 20–75)

## 2022-11-17 RX ORDER — LEVOTHYROXINE SODIUM 125 UG/1
125 TABLET ORAL DAILY
Qty: 90 TABLET | Refills: 3 | Status: SHIPPED | OUTPATIENT
Start: 2022-11-17 | End: 2023-12-11

## 2022-11-21 LAB
BKR LAB AP GYN ADEQUACY: NORMAL
BKR LAB AP GYN INTERPRETATION: NORMAL
BKR LAB AP HPV REFLEX: NORMAL
BKR LAB AP PREVIOUS ABNORMAL: NORMAL
PATH REPORT.COMMENTS IMP SPEC: NORMAL
PATH REPORT.COMMENTS IMP SPEC: NORMAL
PATH REPORT.RELEVANT HX SPEC: NORMAL

## 2022-12-07 ENCOUNTER — MEDICAL CORRESPONDENCE (OUTPATIENT)
Dept: HEALTH INFORMATION MANAGEMENT | Facility: CLINIC | Age: 27
End: 2022-12-07

## 2023-06-22 ENCOUNTER — LAB (OUTPATIENT)
Dept: LAB | Facility: CLINIC | Age: 28
End: 2023-06-22
Payer: COMMERCIAL

## 2023-06-22 DIAGNOSIS — E03.9 ACQUIRED HYPOTHYROIDISM: ICD-10-CM

## 2023-06-22 LAB — TSH SERPL DL<=0.005 MIU/L-ACNC: 3.3 UIU/ML (ref 0.3–4.2)

## 2023-06-22 PROCEDURE — 36415 COLL VENOUS BLD VENIPUNCTURE: CPT

## 2023-06-22 PROCEDURE — 84443 ASSAY THYROID STIM HORMONE: CPT

## 2023-08-06 ENCOUNTER — HEALTH MAINTENANCE LETTER (OUTPATIENT)
Age: 28
End: 2023-08-06

## 2023-08-09 NOTE — DISCHARGE INSTRUCTIONS
Postpartum Vaginal Delivery Instructions    Activity     Ask family and friends for help when you need it.  Do not place anything in your vagina for 6 weeks.  You are not restricted on other activities, but take it easy for a few weeks to allow your body to recover from delivery.  You are able to do any activities you feel up to that point.  No driving until you have stopped taking your pain medications (usually two weeks after delivery).     Call your health care provider if you have any of these symptoms:     Increased pain, swelling, redness, or fluid around your stiches from an episiotomy or perineal tear.  A fever above 100.4 F (38 C) with or without chills when placing a thermometer under your tongue.  You soak a sanitary pad with blood within 1 hour, or you see blood clots larger than a golf ball.  Bleeding that lasts more than 6 weeks.  Vaginal discharge that smells bad.  Severe pain, cramping or tenderness in your lower belly area.  A need to urinate more frequently (use the toilet more often), more urgently (use the toilet very quickly), or it burns when you urinate.  Nausea and vomiting.  Redness, swelling or pain around a vein in your leg.  Problems breastfeeding or a red or painful area on your breast.  Chest pain and cough or are gasping for air.  Problems coping with sadness, anxiety, or depression.  If you have any concerns about hurting yourself or the baby, call your provider immediately.   You have questions or concerns after you return home.     Keep your hands clean:  Always wash your hands before touching your perineal area and stitches.  This helps reduce your risk of infection.  If your hands aren't dirty, you may use an alcohol hand-rub to clean your hands. Keep your nails clean and short.       Patch Test To Be Applied: TRUE test Counseling: I discussed the timing of the procedure and ensured the patient understands that this test requires multiple visits. No topical steroids applied should be applied to the patch testing location and no oral prednisone for two week prior to the test. While the patches are in place they should be kept dry which will limit bathing, swimming an exercise. I also explained that it is common for testing to be negative and this doesn't mean there isn't a allergic reaction occurring. During the testing itching is common. Location Patches Should Be Applied: Back Detail Level: Simple Patch Test Reading Schedule: First Reading at 48 hours and Second Reading at 72 hours

## 2023-08-17 ENCOUNTER — MYC MEDICAL ADVICE (OUTPATIENT)
Dept: FAMILY MEDICINE | Facility: CLINIC | Age: 28
End: 2023-08-17
Payer: COMMERCIAL

## 2023-09-01 NOTE — PROGRESS NOTES
[facility]    Post-Partum Progress Note    Assessment & Plan   Assessment:  Post-partum day #1  Normal spontaneous vaginal delivery    Doing well.    Plan:  Ambulation encouraged  Breast feeding strategies discussed  Uterine massage    Margo Mike MD     Interval History   Doing well.  Pain is adequately controlled.  No fevers.  No history of foul-smelling vaginal discharge.  Good appetite.  Denies chest pain, shortness of breath, nausea or vomiting.  Vaginal bleeding is similar to a heavy menstrual flow.  Breastfeeding well.    Medications     - MEDICATION INSTRUCTIONS -       - MEDICATION INSTRUCTIONS -       oxytocin in 0.9% NaCl       oxytocin in 0.9% NaCl       no pre procedure antibiotic needed         docusate sodium  100 mg Oral Daily       Physical Exam   Temp: 97.7  F (36.5  C) Temp src: Oral BP: 116/71 Pulse: 80   Resp: 16 SpO2: 98 % O2 Device: None (Room air)    Vitals:    09/30/22 1625   Weight: 68 kg (150 lb)     Vital Signs with Ranges  Temp:  [97.7  F (36.5  C)-99.1  F (37.3  C)] 97.7  F (36.5  C)  Pulse:  [80-88] 80  Resp:  [14-16] 16  BP: ()/(50-71) 116/71  SpO2:  [98 %] 98 %  No intake/output data recorded.    Uterine fundus is firm, non-tender and at the level of the umbilicus  Extremities Non-tender  Heart is regular rate and rhythm and lungs clear to auscultation    Data   Recent Labs   Lab Test 09/30/22  1713   AS Negative     Recent Labs   Lab Test 10/02/22  0713 10/01/22  0839   HGB 11.9 12.1     Recent Labs   Lab Test 03/08/22  1605   RUQIGG Positive     
Declines

## 2023-12-11 DIAGNOSIS — E03.9 ACQUIRED HYPOTHYROIDISM: ICD-10-CM

## 2023-12-11 RX ORDER — LEVOTHYROXINE SODIUM 125 UG/1
125 TABLET ORAL DAILY
Qty: 90 TABLET | Refills: 3 | Status: SHIPPED | OUTPATIENT
Start: 2023-12-11 | End: 2024-05-14

## 2024-01-29 ENCOUNTER — LAB (OUTPATIENT)
Dept: LAB | Facility: CLINIC | Age: 29
End: 2024-01-29
Payer: COMMERCIAL

## 2024-01-29 DIAGNOSIS — E03.9 ACQUIRED HYPOTHYROIDISM: Primary | ICD-10-CM

## 2024-01-29 DIAGNOSIS — E03.9 ACQUIRED HYPOTHYROIDISM: ICD-10-CM

## 2024-01-29 PROCEDURE — 36415 COLL VENOUS BLD VENIPUNCTURE: CPT

## 2024-01-29 PROCEDURE — 84443 ASSAY THYROID STIM HORMONE: CPT

## 2024-01-30 LAB — TSH SERPL DL<=0.005 MIU/L-ACNC: 2.4 UIU/ML (ref 0.3–4.2)

## 2024-05-06 DIAGNOSIS — N92.6 MISSED MENSES: Primary | ICD-10-CM

## 2024-05-11 ENCOUNTER — HOSPITAL ENCOUNTER (OUTPATIENT)
Dept: ULTRASOUND IMAGING | Facility: HOSPITAL | Age: 29
Discharge: HOME | End: 2024-05-11
Attending: FAMILY MEDICINE | Admitting: FAMILY MEDICINE
Payer: COMMERCIAL

## 2024-05-11 DIAGNOSIS — N92.6 MISSED MENSES: ICD-10-CM

## 2024-05-11 PROCEDURE — 76801 OB US < 14 WKS SINGLE FETUS: CPT

## 2024-05-13 ENCOUNTER — OFFICE VISIT (OUTPATIENT)
Dept: FAMILY MEDICINE | Facility: CLINIC | Age: 29
End: 2024-05-13
Payer: COMMERCIAL

## 2024-05-13 VITALS
DIASTOLIC BLOOD PRESSURE: 64 MMHG | BODY MASS INDEX: 32.44 KG/M2 | SYSTOLIC BLOOD PRESSURE: 104 MMHG | WEIGHT: 155.2 LBS | HEART RATE: 89 BPM | OXYGEN SATURATION: 97 % | TEMPERATURE: 98.5 F | RESPIRATION RATE: 18 BRPM

## 2024-05-13 DIAGNOSIS — E55.9 VITAMIN D DEFICIENCY: Primary | ICD-10-CM

## 2024-05-13 DIAGNOSIS — O36.8390 NON-REASSURING FETAL HEART TONES COMPLICATING PREGNANCY, ANTEPARTUM: ICD-10-CM

## 2024-05-13 DIAGNOSIS — E16.2 HYPOGLYCEMIA: ICD-10-CM

## 2024-05-13 DIAGNOSIS — E03.9 ACQUIRED HYPOTHYROIDISM: ICD-10-CM

## 2024-05-13 DIAGNOSIS — J30.2 SEASONAL ALLERGIC RHINITIS, UNSPECIFIED TRIGGER: ICD-10-CM

## 2024-05-13 DIAGNOSIS — N92.6 MISSED MENSES: ICD-10-CM

## 2024-05-13 LAB — HBA1C MFR BLD: 5.8 % (ref 0–5.6)

## 2024-05-13 PROCEDURE — 82947 ASSAY GLUCOSE BLOOD QUANT: CPT | Performed by: FAMILY MEDICINE

## 2024-05-13 PROCEDURE — 83036 HEMOGLOBIN GLYCOSYLATED A1C: CPT | Performed by: FAMILY MEDICINE

## 2024-05-13 PROCEDURE — 99214 OFFICE O/P EST MOD 30 MIN: CPT | Performed by: FAMILY MEDICINE

## 2024-05-13 PROCEDURE — 36415 COLL VENOUS BLD VENIPUNCTURE: CPT | Performed by: FAMILY MEDICINE

## 2024-05-13 PROCEDURE — 84439 ASSAY OF FREE THYROXINE: CPT | Performed by: FAMILY MEDICINE

## 2024-05-13 PROCEDURE — 84443 ASSAY THYROID STIM HORMONE: CPT | Performed by: FAMILY MEDICINE

## 2024-05-13 PROCEDURE — 82306 VITAMIN D 25 HYDROXY: CPT | Performed by: FAMILY MEDICINE

## 2024-05-13 RX ORDER — TRIAMCINOLONE ACETONIDE 55 UG/1
2 SPRAY, METERED NASAL DAILY
Qty: 16 G | Refills: 3 | Status: SHIPPED | OUTPATIENT
Start: 2024-05-13 | End: 2024-08-29

## 2024-05-13 NOTE — PATIENT INSTRUCTIONS
Nasacort AQ, 2 sprays each nostril daily.    Then if you need to add on Claritin, Allegra or Zyrtec daily that is safe for allergies.    We want you on a prenatal vitamin daily and please ensure that it has iron, DHA and folic acid.    If you need a prescription for nausea or vomiting let me know.    We will help set a 12-week first OB appointment.    We have a new clinic called the acute diagnostic clinic at the [facility] which is a step up from urgent care but a stepdown from the emergency room.  If pregnant women are having persistent nausea and vomiting and need IV fluids that is a great place to go but you just need to call our clinic, talk to an RN triage nurse and they will set an appointment for you.    Robitussin products are okay in small doses.    Tylenol is safe in any form.    No ibuprofen or Aleve when you are pregnant.    Since you sound like you might be having some low blood sugar events or hypoglycemic events please eat 3 meals and 2 snacks a day each with some protein.      We will check thyroid function today and that should be done monthly.  When you are in the [facility] please have that checked monthly and send me the results.  ( There June 20-Aug 22)    OB folder given, please fill out the 4 pieces of paper before next visit thank you.

## 2024-05-13 NOTE — PROGRESS NOTES
Assessment:   Pregnancy confirmation    ICD-10-CM    1. Vitamin D deficiency  E55.9 Vitamin D Deficiency     Vitamin D Deficiency      2. Acquired hypothyroidism  E03.9 TSH with free T4 reflex     TSH with free T4 reflex      3. Missed menses  N92.6       4. Non-reassuring fetal heart tones complicating pregnancy, antepartum  O36.8390  OB < 14 Weeks Single      5. Seasonal allergic rhinitis, unspecified trigger  J30.2 triamcinolone (NASACORT) 55 MCG/ACT nasal aerosol      6. Hypoglycemia  E16.2 Glucose     Hemoglobin A1c     Glucose     Hemoglobin A1c          Plan:    Counseled patient on early pregnancy issues and plans for continued pregnancy:  - OTC meds safe in early pregnancy,  - importance of prenatal vitamins, and routine activities without restrictions.    - importance of ETOH abstinence and no other drug use  Informed of signs and symptoms of miscarriage and to call with questions of spotting/bleeding management and possible need to come in for evaluation before routine visit at 10-12 weeks.   Set 40 minute visit at 12 weeks pregnant.  Discussed information in the OB packet.  Will let us know if early genetic testing wanted.  Can call for script of Phenergan or Reglan if needed for nausea.  Early ultrasound ordered.  30 minutes spent on the day of encounter doing chart review, history and exam, documentation, and further activities as noted.     Nasacort AQ, 2 sprays each nostril daily.    Then if you need to add on Claritin, Allegra or Zyrtec daily that is safe for allergies.    We want you on a prenatal vitamin daily and please ensure that it has iron, DHA and folic acid.    If you need a prescription for nausea or vomiting let me know.    We will help set a 12-week first OB appointment.    We have a new clinic called the acute diagnostic clinic at the [facility] which is a step up from urgent care but a stepdown from the emergency room.  If pregnant women are having persistent nausea and  vomiting and need IV fluids that is a great place to go but you just need to call our clinic, talk to an RN triage nurse and they will set an appointment for you.    Robitussin products are okay in small doses.    Tylenol is safe in any form.    No ibuprofen or Aleve when you are pregnant.    Since you sound like you might be having some low blood sugar events or hypoglycemic events please eat 3 meals and 2 snacks a day each with some protein.      We will check thyroid function today and that should be done monthly.  When you are in the [facility] please have that checked monthly and send me the results.  ( There June 20-Aug 22)    OB folder given, please fill out the 4 pieces of paper before next visit thank you.    chief complaint     HPI: 28 year old year old female come in  today for a pregnancy confirmation. LMP was 3-27-24. This would make her 6 2/7 weeks pregnant with EDC 1-4-25. Not having any spotting. She is having nausea. Early US only showed .  3-27-24  Traveling to [facility] June 20 through August 22.  Sometime if she is hungry she will feel weak an shake.  Hypothyroid and on medication.    1st preg gest DM, diet controlled.  No gest HTN, no pretem labor  Both vaginal deliveries.    Objective:  /64 (BP Location: Left arm, Patient Position: Sitting, Cuff Size: Adult Large)   Pulse 89   Temp 98.5  F (36.9  C) (Oral)   Resp 18   Wt 70.4 kg (155 lb 3.2 oz)   SpO2 97%   BMI 32.44 kg/m     General: No apparent distress  UPT: Positive          Answers submitted by the patient for this visit:  General Questionnaire (Submitted on 5/13/2024)  Chief Complaint: Chronic problems general questions HPI Form  What is the reason for your visit today? : Pregnancy  How many servings of fruits and vegetables do you eat daily?: 0-1  On average, how many sweetened beverages do you drink each day (Examples: soda, juice, sweet tea, etc.  Do NOT count diet or artificially sweetened beverages)?: 1  How  many minutes a day do you exercise enough to make your heart beat faster?: 9 or less  How many days a week do you exercise enough to make your heart beat faster?: 3 or less  How many days per week do you miss taking your medication?: 0

## 2024-05-14 DIAGNOSIS — E03.9 ACQUIRED HYPOTHYROIDISM: Primary | ICD-10-CM

## 2024-05-14 LAB
FASTING STATUS PATIENT QL REPORTED: YES
GLUCOSE SERPL-MCNC: 75 MG/DL (ref 70–99)
T4 FREE SERPL-MCNC: 1.28 NG/DL (ref 0.9–1.7)
TSH SERPL DL<=0.005 MIU/L-ACNC: 5.91 UIU/ML (ref 0.3–4.2)
VIT D+METAB SERPL-MCNC: 22 NG/ML (ref 20–50)

## 2024-05-14 RX ORDER — LEVOTHYROXINE SODIUM 137 UG/1
137 TABLET ORAL DAILY
Qty: 90 TABLET | Refills: 1 | Status: SHIPPED | OUTPATIENT
Start: 2024-05-14 | End: 2024-08-30

## 2024-05-25 ENCOUNTER — NURSE TRIAGE (OUTPATIENT)
Dept: NURSING | Facility: CLINIC | Age: 29
End: 2024-05-25
Payer: COMMERCIAL

## 2024-05-25 NOTE — TELEPHONE ENCOUNTER
The patient reports she is eight weeks pregnant  Cold symptoms for two weeks now  She reports a sore throat that is painful with swallowing, and she rates the pain 5/10  She denies any Covid-19 testing  She denies any swollen glands in jaw or neck area  Dry cough  Nasal congestion is now yellowish color  Feels like phlegm stuck in her throat  She denies any shortness or breath or difficulty breathing  Triage guidelines recommend to see pcp within 3 days  Caller verbalized and understands directives      Reason for Disposition   [1] Sinus congestion (pressure, fullness) AND [2] present > 10 days    Additional Information   Negative: SEVERE difficulty breathing (e.g., struggling for each breath, speaks in single words)   Negative: Sounds like a life-threatening emergency to the triager   Negative: [1] Difficulty breathing AND [2] not from stuffy nose (e.g., not relieved by cleaning out the nose)   Negative: Runny nose is caused by pollen or other allergies   Negative: Cough is main symptom   Negative: Severe sore throat   Negative: Fever > 104 F (40 C)   Negative: Patient sounds very sick or weak to the triager   Negative: [1] Fever > 101 F (38.3 C) AND [2] age > 60 years   Negative: [1] Fever > 100.0 F (37.8 C) AND [2] bedridden (e.g., CVA, chronic illness, recovering from surgery)   Negative: [1] Fever > 100.0 F (37.8 C) AND [2] diabetes mellitus or weak immune system (e.g., HIV positive, cancer chemo, splenectomy, organ transplant, chronic steroids)   Negative: Fever present > 3 days (72 hours)   Negative: [1] Fever returns after gone for over 24 hours AND [2] symptoms worse or not improved   Negative: [1] Sinus pain (not just congestion) AND [2] fever   Negative: Earache   Negative: [1] SEVERE sore throat AND [2] present > 24 hours    Protocols used: Common Cold-A-AH

## 2024-05-27 ENCOUNTER — OFFICE VISIT (OUTPATIENT)
Dept: FAMILY MEDICINE | Facility: CLINIC | Age: 29
End: 2024-05-27
Payer: COMMERCIAL

## 2024-05-27 VITALS
TEMPERATURE: 99.2 F | DIASTOLIC BLOOD PRESSURE: 74 MMHG | HEART RATE: 93 BPM | OXYGEN SATURATION: 99 % | RESPIRATION RATE: 16 BRPM | SYSTOLIC BLOOD PRESSURE: 116 MMHG

## 2024-05-27 DIAGNOSIS — B96.89 BACTERIAL SINUSITIS: ICD-10-CM

## 2024-05-27 DIAGNOSIS — J30.2 SEASONAL ALLERGIC RHINITIS, UNSPECIFIED TRIGGER: ICD-10-CM

## 2024-05-27 DIAGNOSIS — N39.0 ACUTE UTI (URINARY TRACT INFECTION): ICD-10-CM

## 2024-05-27 DIAGNOSIS — R30.0 DYSURIA: Primary | ICD-10-CM

## 2024-05-27 DIAGNOSIS — J02.9 SORETHROAT: ICD-10-CM

## 2024-05-27 DIAGNOSIS — J32.9 BACTERIAL SINUSITIS: ICD-10-CM

## 2024-05-27 LAB
ALBUMIN UR-MCNC: NEGATIVE MG/DL
APPEARANCE UR: CLEAR
BACTERIA #/AREA URNS HPF: ABNORMAL /HPF
BILIRUB UR QL STRIP: NEGATIVE
COLOR UR AUTO: YELLOW
DEPRECATED S PYO AG THROAT QL EIA: NEGATIVE
GLUCOSE UR STRIP-MCNC: NEGATIVE MG/DL
GROUP A STREP BY PCR: NOT DETECTED
HGB UR QL STRIP: ABNORMAL
KETONES UR STRIP-MCNC: NEGATIVE MG/DL
LEUKOCYTE ESTERASE UR QL STRIP: ABNORMAL
NITRATE UR QL: NEGATIVE
PH UR STRIP: 6 [PH] (ref 5–8)
RBC #/AREA URNS AUTO: ABNORMAL /HPF
SP GR UR STRIP: 1.02 (ref 1–1.03)
SQUAMOUS #/AREA URNS AUTO: ABNORMAL /LPF
UROBILINOGEN UR STRIP-ACNC: 0.2 E.U./DL
WBC #/AREA URNS AUTO: >100 /HPF
WBC CLUMPS #/AREA URNS HPF: PRESENT /HPF

## 2024-05-27 PROCEDURE — 99213 OFFICE O/P EST LOW 20 MIN: CPT | Performed by: STUDENT IN AN ORGANIZED HEALTH CARE EDUCATION/TRAINING PROGRAM

## 2024-05-27 PROCEDURE — 87086 URINE CULTURE/COLONY COUNT: CPT | Performed by: STUDENT IN AN ORGANIZED HEALTH CARE EDUCATION/TRAINING PROGRAM

## 2024-05-27 PROCEDURE — 87186 SC STD MICRODIL/AGAR DIL: CPT | Performed by: STUDENT IN AN ORGANIZED HEALTH CARE EDUCATION/TRAINING PROGRAM

## 2024-05-27 PROCEDURE — 87651 STREP A DNA AMP PROBE: CPT | Performed by: STUDENT IN AN ORGANIZED HEALTH CARE EDUCATION/TRAINING PROGRAM

## 2024-05-27 PROCEDURE — 87088 URINE BACTERIA CULTURE: CPT | Performed by: STUDENT IN AN ORGANIZED HEALTH CARE EDUCATION/TRAINING PROGRAM

## 2024-05-27 PROCEDURE — 81001 URINALYSIS AUTO W/SCOPE: CPT

## 2024-05-27 RX ORDER — NITROFURANTOIN 25; 75 MG/1; MG/1
100 CAPSULE ORAL 2 TIMES DAILY
Qty: 10 CAPSULE | Refills: 0 | Status: SHIPPED | OUTPATIENT
Start: 2024-05-27 | End: 2024-05-27

## 2024-05-27 RX ORDER — FLUTICASONE PROPIONATE 50 MCG
1 SPRAY, SUSPENSION (ML) NASAL DAILY
Refills: 0 | Status: CANCELLED | OUTPATIENT
Start: 2024-05-27

## 2024-05-27 RX ORDER — CODEINE PHOSPHATE AND GUAIFENESIN 10; 100 MG/5ML; MG/5ML
1-2 SOLUTION ORAL EVERY 4 HOURS PRN
COMMUNITY
End: 2024-06-17

## 2024-05-27 RX ORDER — AMOXICILLIN 875 MG
875 TABLET ORAL 2 TIMES DAILY
Qty: 20 TABLET | Refills: 0 | Status: SHIPPED | OUTPATIENT
Start: 2024-05-27 | End: 2024-06-06

## 2024-05-27 NOTE — PATIENT INSTRUCTIONS
Urinary Tract Infection (UTI) in Women: Care Instructions  Overview     A urinary tract infection (UTI) is an infection caused by bacteria. It can happen anywhere in the urinary tract. A UTI can happen in the:  Kidneys.  Ureters, the tubes that connect the kidneys to the bladder.  Bladder.  Urethra, where the urine comes out.  Most UTIs are bladder infections. They often cause pain or burning when you urinate.  Most UTIs can be cured with antibiotics. If you are prescribed antibiotics, be sure to complete your treatment so that the infection does not get worse.  Follow-up care is a key part of your treatment and safety. Be sure to make and go to all appointments, and call your doctor if you are having problems. It's also a good idea to know your test results and keep a list of the medicines you take.  How can you care for yourself at home?  Take your antibiotics as directed. Do not stop taking them just because you feel better. You need to take the full course of antibiotics.  Drink extra water and other fluids for the next day or two. This will help make the urine less concentrated and help wash out the bacteria that are causing the infection. (If you have kidney, heart, or liver disease and have to limit fluids, talk with your doctor before you increase the amount of fluids you drink.)  Avoid drinks that are carbonated or have caffeine. They can irritate the bladder.  Urinate often. Try to empty your bladder each time.  To relieve pain, take a hot bath or lay a heating pad set on low over your lower belly or genital area. Never go to sleep with a heating pad in place.  To prevent UTIs  Drink plenty of water each day. This helps you urinate often, which clears bacteria from your system. (If you have kidney, heart, or liver disease and have to limit fluids, talk with your doctor before you increase the amount of fluids you drink.)  Urinate when you need to.  If you are sexually active, urinate right after you have  "sex.  Change sanitary pads often.  Avoid douches, bubble baths, feminine hygiene sprays, and other feminine hygiene products that have deodorants.  After going to the bathroom, wipe from front to back.  When should you call for help?   Call your doctor now or seek immediate medical care if:    You have new or worse fever, chills, nausea, or vomiting.     You have new pain in your back just below your rib cage. This is called flank pain.     There is new blood or pus in your urine.     You have any problems with your antibiotic medicine.   Watch closely for changes in your health, and be sure to contact your doctor if:    You are not getting better after taking an antibiotic for 2 days.     Your symptoms go away but then come back.   Where can you learn more?  Go to [email or web address]  Enter K848 in the search box to learn more about \"Urinary Tract Infection (UTI) in Women: Care Instructions.\"  Current as of: November 15, 2023               Content Version: 14.0    4792-1528 [employer].   Care instructions adapted under license by your healthcare professional. If you have questions about a medical condition or this instruction, always ask your healthcare professional. [employer] disclaims any warranty or liability for your use of this information.      "

## 2024-05-27 NOTE — PROGRESS NOTES
chief complaint: Sinus congestion    HPI:  Juan J Burgess is a 28 year old female who presents today complaining of cold symptoms. Has sinus pressure. Has nasal congestion and intense pressure on the head. Has yellowish and greenish nasal discharge. Also has mild to moderate cough with her symptoms. Her symptoms has been ongoing for more than 2 weeks now.     Also has urinary symptoms that started this morning. Noted burning sensation when she urinated this morning. Also noticed in creased urinary frequency as well. No fever    History obtained from the patient.    Problem List:  2024-05: Seasonal allergic rhinitis, unspecified trigger  2022-11: Vaginitis and vulvovaginitis  2022-09: Pregnancy  2022-07: Encounter for supervision of other normal pregnancy in third   trimester  2022-03: Subchorionic hemorrhage  2022-03: Vitamin D deficiency  2022-01: Pregnancy with fetus of unknown gestational age  2022-01: Hashimoto's disease  2022-01: History of gestational diabetes mellitus  2022-01: History of hypothyroidism  2022-01: Irregular periods/menstrual cycles  2022-01: Polycystic ovaries  2019-06: Acquired hypothyroidism  2017-11: Pregnant  2017-07: Pregnancy      Past Medical History:   Diagnosis Date    Migraines     Palpitation     has felt some palpitations over the last 3 weeks       Social History     Tobacco Use    Smoking status: Never     Passive exposure: Never    Smokeless tobacco: Never   Substance Use Topics    Alcohol use: Yes     Comment: 2 glasses of wine       Review of systems  ROS negative except for pertinent positives listed in HPI      Vitals:    05/27/24 1015   BP: 116/74   Pulse: 93   Resp: 16   Temp: 99.2  F (37.3  C)   TempSrc: Tympanic   SpO2: 99%       Physical Exam  Constitutional: healthy, alert, and no distress  Head: Normocephalic.   Neck: Neck supple. No adenopathy. T  ENT: Ethmoidal and maxillary sinus tenderness to palpation.  Flexion of the head causes increased pressure felt in  sinuses.   Cardiovascular:  RRR. No murmurs, clicks gallops or rub  Respiratory:unlabored respiratory effort  Gastrointestinal: non-tender, no CVA tenderness  Musculoskeletal: extremities normal- no gross deformities noted, gait normal  Psychiatric: mentation appears normal and affect normal/bright    Assessment & Plan     Juan J was seen today for cold symptoms, sinus problem, pharyngitis, uti, respiratory problems, chills and cough.    Diagnoses and all orders for this visit:    Acute UTI (urinary tract infection)  Dysuria  Treated for UTI with amoxicillin as this could also cover bacterial sinusitis, patient aware that cultures were resistant and she might require another antibiotics.  Discussed signs of pyelonephritis and worsening infection, advised patient to seek urgent medical care if the symptoms are noted.  -     UA Macroscopic with reflex to Microscopic and Culture - Clinic Collect  -     Urine Microscopic Exam  -     Urine Culture  -     amoxicillin (AMOXIL) 875 MG tablet; Take 1 tablet (875 mg) by mouth 2 times daily for 10 days    Sorethroat  Allergic rhinitis  Differential diagnosis of patients symptoms includes but not limited to postnasal drip, viral respiratory infection, streptococcal pharyngitis/tonsillitis, bronchiolitis, asthma exacerbation, e.t.c  Of note most likely cause is postnasal drip.  Discussed safety precautions return to the ER.  -Conservative measures such as warm water or tea with honey  -Using a steamer, okay to use vicks menthol in the steamer  -As needed tylenol for fever  -Push fluids and rest  -Patient has Nasacort at home, recommended to be used daily for at least 2 weeks until patient sees PCP.  -     Streptococcus A Rapid Screen w/Reflex to PCR - Clinic Collect  -     Group A Streptococcus PCR Throat Swab    Bacterial sinusitis  Likely the cause of ongoing nasal congestion and sinus pressure.    -     amoxicillin (AMOXIL) 875 MG tablet; Take 1 tablet (875 mg) by mouth 2  times daily for 10 days      At the end of the encounter, I discussed results, diagnosis, medications. Discussed red flags for immediate return to clinic/ER, as well as indications for follow up if no improvement. Patient understood and agreed to plan. Patient was stable for discharge.

## 2024-05-28 LAB — BACTERIA UR CULT: ABNORMAL

## 2024-06-11 ENCOUNTER — MYC MEDICAL ADVICE (OUTPATIENT)
Dept: FAMILY MEDICINE | Facility: CLINIC | Age: 29
End: 2024-06-11
Payer: COMMERCIAL

## 2024-06-11 DIAGNOSIS — R11.2 NAUSEA AND VOMITING, UNSPECIFIED VOMITING TYPE: Primary | ICD-10-CM

## 2024-06-12 RX ORDER — ONDANSETRON 4 MG/1
4 TABLET, ORALLY DISINTEGRATING ORAL EVERY 8 HOURS PRN
Qty: 45 TABLET | Refills: 1 | Status: SHIPPED | OUTPATIENT
Start: 2024-06-12 | End: 2024-08-29

## 2024-06-14 ENCOUNTER — HOSPITAL ENCOUNTER (OUTPATIENT)
Dept: ULTRASOUND IMAGING | Facility: HOSPITAL | Age: 29
Discharge: HOME | End: 2024-06-14
Attending: FAMILY MEDICINE | Admitting: FAMILY MEDICINE
Payer: COMMERCIAL

## 2024-06-14 DIAGNOSIS — O36.8390 NON-REASSURING FETAL HEART TONES COMPLICATING PREGNANCY, ANTEPARTUM: ICD-10-CM

## 2024-06-14 PROCEDURE — 76801 OB US < 14 WKS SINGLE FETUS: CPT

## 2024-06-16 LAB
ABO/RH(D): NORMAL
ANTIBODY SCREEN: NEGATIVE
SPECIMEN EXPIRATION DATE: NORMAL

## 2024-06-17 ENCOUNTER — PRENATAL OFFICE VISIT (OUTPATIENT)
Dept: FAMILY MEDICINE | Facility: CLINIC | Age: 29
End: 2024-06-17
Payer: COMMERCIAL

## 2024-06-17 VITALS
TEMPERATURE: 98.2 F | RESPIRATION RATE: 16 BRPM | DIASTOLIC BLOOD PRESSURE: 66 MMHG | SYSTOLIC BLOOD PRESSURE: 121 MMHG | OXYGEN SATURATION: 98 % | BODY MASS INDEX: 31.98 KG/M2 | HEART RATE: 90 BPM | WEIGHT: 153 LBS

## 2024-06-17 DIAGNOSIS — Z34.81 ENCOUNTER FOR SUPERVISION OF OTHER NORMAL PREGNANCY IN FIRST TRIMESTER: Primary | ICD-10-CM

## 2024-06-17 DIAGNOSIS — E66.09 CLASS 1 OBESITY DUE TO EXCESS CALORIES WITHOUT SERIOUS COMORBIDITY WITH BODY MASS INDEX (BMI) OF 32.0 TO 32.9 IN ADULT: ICD-10-CM

## 2024-06-17 DIAGNOSIS — E55.9 VITAMIN D DEFICIENCY: ICD-10-CM

## 2024-06-17 DIAGNOSIS — E66.811 CLASS 1 OBESITY DUE TO EXCESS CALORIES WITHOUT SERIOUS COMORBIDITY WITH BODY MASS INDEX (BMI) OF 32.0 TO 32.9 IN ADULT: ICD-10-CM

## 2024-06-17 DIAGNOSIS — E03.9 ACQUIRED HYPOTHYROIDISM: ICD-10-CM

## 2024-06-17 LAB
ALBUMIN UR-MCNC: NEGATIVE MG/DL
APPEARANCE UR: CLEAR
BACTERIA #/AREA URNS HPF: ABNORMAL /HPF
BILIRUB UR QL STRIP: NEGATIVE
CLUE CELLS: ABNORMAL
COLOR UR AUTO: YELLOW
ERYTHROCYTE [DISTWIDTH] IN BLOOD BY AUTOMATED COUNT: 13 % (ref 10–15)
GLUCOSE UR STRIP-MCNC: NEGATIVE MG/DL
HBA1C MFR BLD: 5.7 % (ref 0–5.6)
HCT VFR BLD AUTO: 34.3 % (ref 35–47)
HGB BLD-MCNC: 11.6 G/DL (ref 11.7–15.7)
HGB UR QL STRIP: NEGATIVE
KETONES UR STRIP-MCNC: NEGATIVE MG/DL
LEUKOCYTE ESTERASE UR QL STRIP: ABNORMAL
MCH RBC QN AUTO: 27.4 PG (ref 26.5–33)
MCHC RBC AUTO-ENTMCNC: 33.8 G/DL (ref 31.5–36.5)
MCV RBC AUTO: 81 FL (ref 78–100)
NITRATE UR QL: NEGATIVE
PH UR STRIP: 7 [PH] (ref 5–8)
PLATELET # BLD AUTO: 283 10E3/UL (ref 150–450)
RBC # BLD AUTO: 4.23 10E6/UL (ref 3.8–5.2)
RBC #/AREA URNS AUTO: ABNORMAL /HPF
SP GR UR STRIP: 1.02 (ref 1–1.03)
SQUAMOUS #/AREA URNS AUTO: ABNORMAL /LPF
TRICHOMONAS, WET PREP: ABNORMAL
UROBILINOGEN UR STRIP-ACNC: 1 E.U./DL
WBC # BLD AUTO: 11 10E3/UL (ref 4–11)
WBC #/AREA URNS AUTO: ABNORMAL /HPF
WBC'S/HIGH POWER FIELD, WET PREP: ABNORMAL
YEAST #/AREA URNS HPF: ABNORMAL /HPF
YEAST, WET PREP: ABNORMAL

## 2024-06-17 PROCEDURE — 99215 OFFICE O/P EST HI 40 MIN: CPT | Performed by: FAMILY MEDICINE

## 2024-06-17 PROCEDURE — 86780 TREPONEMA PALLIDUM: CPT | Performed by: FAMILY MEDICINE

## 2024-06-17 PROCEDURE — 85027 COMPLETE CBC AUTOMATED: CPT | Performed by: FAMILY MEDICINE

## 2024-06-17 PROCEDURE — 87210 SMEAR WET MOUNT SALINE/INK: CPT | Performed by: FAMILY MEDICINE

## 2024-06-17 PROCEDURE — 36415 COLL VENOUS BLD VENIPUNCTURE: CPT | Performed by: FAMILY MEDICINE

## 2024-06-17 PROCEDURE — 87389 HIV-1 AG W/HIV-1&-2 AB AG IA: CPT | Performed by: FAMILY MEDICINE

## 2024-06-17 PROCEDURE — 86762 RUBELLA ANTIBODY: CPT | Performed by: FAMILY MEDICINE

## 2024-06-17 PROCEDURE — 86850 RBC ANTIBODY SCREEN: CPT | Performed by: FAMILY MEDICINE

## 2024-06-17 PROCEDURE — 87340 HEPATITIS B SURFACE AG IA: CPT | Performed by: FAMILY MEDICINE

## 2024-06-17 PROCEDURE — 86900 BLOOD TYPING SEROLOGIC ABO: CPT | Performed by: FAMILY MEDICINE

## 2024-06-17 PROCEDURE — 84443 ASSAY THYROID STIM HORMONE: CPT | Performed by: FAMILY MEDICINE

## 2024-06-17 PROCEDURE — 82306 VITAMIN D 25 HYDROXY: CPT | Performed by: FAMILY MEDICINE

## 2024-06-17 PROCEDURE — 87591 N.GONORRHOEAE DNA AMP PROB: CPT | Performed by: FAMILY MEDICINE

## 2024-06-17 PROCEDURE — 87086 URINE CULTURE/COLONY COUNT: CPT | Performed by: FAMILY MEDICINE

## 2024-06-17 PROCEDURE — 83036 HEMOGLOBIN GLYCOSYLATED A1C: CPT | Performed by: FAMILY MEDICINE

## 2024-06-17 PROCEDURE — 81001 URINALYSIS AUTO W/SCOPE: CPT | Performed by: FAMILY MEDICINE

## 2024-06-17 PROCEDURE — 87491 CHLMYD TRACH DNA AMP PROBE: CPT | Performed by: FAMILY MEDICINE

## 2024-06-17 PROCEDURE — 86901 BLOOD TYPING SEROLOGIC RH(D): CPT | Performed by: FAMILY MEDICINE

## 2024-06-17 NOTE — PATIENT INSTRUCTIONS
For acid reflux, burping or stomach upset, you can take Tums max of 4 in a day if 500 mg tabs, or max of 2000 mg per day, and Pepcid 20 mg twice a day as needed. If that is not helping, Dr. Courtney can prescribe Protonix.     You can use the Zofran as needed for nausea or vomiting.    Since you will be in the [facility] from June 20, 2024 to August 19, 2024 I would like you to have an ultrasound there in about a month to recheck the small implantation bleed and then again in 2 months for the full 20-week fetal survey.  Please scan the results and send them via [employer] to Dr. Courtney.    In 1 month if you are able to have the 1 hour glucose test in the [facility] please do that if not please asked them to check an A1c and then send those results to us.    Then we will do the 1 hour glucose again at the 24-week visit.    You will want your TSH checked every month in the [facility] and then send that result to me.    Since your grandmother has tuberculosis if you are going to be near her I would mask and try not to have contact with her.  When you come back we can check this IGRA TB test and every 8 weeks.    With pregravid body mass index at 32, she will need extra monitoring at the end of pregnancy.  Will get a biophysical profile ultrasound at 36 weeks and start weekly nonstress test at 36 weeks.

## 2024-06-17 NOTE — PROGRESS NOTES
ASSESSMENT/PLAN:       ICD-10-CM    1. Encounter for supervision of other normal pregnancy in first trimester  Z34.81 ABO/Rh type and screen     Hepatitis B surface antigen     CBC with platelets     HIV Antigen Antibody Combo     Rubella Antibody IgG     Treponema Abs w Reflex to RPR and Titer     *UA reflex to Microscopic     *UA reflex to Microscopic     Urine Culture Aerobic Bacterial - lab collect     Urine Microscopic Exam     Hemoglobin A1c     Wet prep - Clinic Collect     Neisseria gonorrhoeae PCR     Chlamydia trachomatis PCR     Urine Culture Aerobic Bacterial - lab collect     ABO/Rh type and screen     Hepatitis B surface antigen     CBC with platelets     HIV Antigen Antibody Combo     Rubella Antibody IgG     Treponema Abs w Reflex to RPR and Titer     Hemoglobin A1c     Neisseria gonorrhoeae PCR     Chlamydia trachomatis PCR      2. Vitamin D deficiency  E55.9 Vitamin D Deficiency     Vitamin D Deficiency      3. Acquired hypothyroidism  E03.9 TSH with free T4 reflex     TSH with free T4 reflex          29 year old , 11w2d weeks of pregnancy with SIMONE of Not found.    Discussed as follows: Staying away from patients with tuberculosis and hypothyroidism    Counseling given:   - Follow up in 4-6 weeks for return OB visit.  - Recommended weight gain for pregnancy: 15-25 lbs.         PLAN/PATIENT INSTRUCTIONS:    For acid reflux, burping or stomach upset, you can take Tums max of 4 in a day if 500 mg tabs, or max of 2000 mg per day, and Pepcid 20 mg twice a day as needed. If that is not helping, Dr. Courtney can prescribe Protonix.     You can use the Zofran as needed for nausea or vomiting.    Since you will be in the [facility] from 2024 to 2024 I would like you to have an ultrasound there in about a month to recheck the small implantation bleed and then again in 2 months for the full 20-week fetal survey.  Please scan the results and send them via Medefy[employer] to Dr. Courtney.    In  1 month if you are able to have the 1 hour glucose test in the [facility] please do that if not please asked them to check an A1c and then send those results to us.    Then we will do the 1 hour glucose again at the 24-week visit.    You will want your TSH checked every month in the [facility] and then send that result to me.    Since your grandmother has tuberculosis if you are going to be near her I would mask and try not to have contact with her.  When you come back we can check this IGRA TB test and every 8 weeks.    With pregravid body mass index at 32, she will need extra monitoring at the end of pregnancy.  Will get a biophysical profile ultrasound at 36 weeks and start weekly nonstress test at 36 weeks.    40 minutes spent with this patient including chart review, patient visit and documentation.    The longitudinal plan of care for the diagnosis(es)/condition(s) as documented were addressed during this visit. Due to the added complexity in care, I will continue to support Juan J in the subsequent management and with ongoing continuity of care.  Managing hypothyroidism.    SUBJECTIVE:     HPI:    This is a 29 year old female patient,  who presents for her first obstetrical visit.    SIMONE: Not found..  She is 11w2d weeks.  Her cycles are regular.  Her last menstrual period was normal.   Since her LMP, she has experienced  nausea and emesis).   She denies vaginal bleeding.  She feels like her nausea and vomiting is improving.  She will  the prescription for Zofran to take with her on the trip if needed.  She does have access to Medicaid care in the [facility] when there.  She did have gestational diabetes with her first pregnancy.  She denies any bleeding.    Additional History: Will be traveling to the [facility]  through .  Her grandmother does have active tuberculosis.  She has been on her medication for a month.  Patient is wondering if she can be around her as it is the  last time they might be able to see her.    2 normal spontaneous vaginal deliveries.      First delivery on 11/7/2017 at 39-5/7 weeks without complication.  She was induced because of gestational diabetes and a nonreassuring nonstress test.  She had gestational diabetes, diet-controlled.  Hypothyroid, on levothyroxine    Second pregnancy normal spontaneous vaginal delivery without complication at 41-1/7 weeks on 10/1/2022. retained placenta and needed D&C on 10/14/2022.  Hypothyroid on levothyroxine.    Have you travelled during the pregnancy?No  Have your sexual partner(s) travelled during the pregnancy?No      HISTORY:   Planned Pregnancy: Yes  Marital Status:   Occupation:   Living in Household: Spouse and Children    Past History:  Her past medical history  she did have gestational diabetes with her first pregnancy .      She has a history of  gestational diabetes, diet controlled    Since her last LMP she denies use of alcohol, tobacco and street drugs.    Past medical, surgical, social and family history were reviewed and updated in [facility].        Current Outpatient Medications   Medication Sig Dispense Refill    levothyroxine (SYNTHROID/LEVOTHROID) 137 MCG tablet Take 1 tablet (137 mcg) by mouth daily 90 tablet 1    ondansetron (ZOFRAN ODT) 4 MG ODT tab Take 1 tablet (4 mg) by mouth every 8 hours as needed for nausea 45 tablet 1    prenatal vitamin iron-folic acid 27mg-0.8mg (PRENATAL S) 27 mg iron- 800 mcg Tab tablet [PRENATAL VITAMIN IRON-FOLIC ACID 27MG-0.8MG (PRENATAL S) 27 MG IRON- 800 MCG TAB TABLET] Take 1 tablet by mouth daily.      triamcinolone (NASACORT) 55 MCG/ACT nasal aerosol Spray 2 sprays into both nostrils daily 16 g 3     No current facility-administered medications for this visit.       ROS:   12 point review of systems negative other than symptoms noted below or in the HPI.      OBJECTIVE:     EXAM:  /66 (BP Location: Left arm, Patient Position: Sitting, Cuff Size: Adult  Regular)   Pulse 90   Temp 98.2  F (36.8  C) (Oral)   Resp 16   Wt 69.4 kg (153 lb)   SpO2 98%   BMI 31.98 kg/m   Body mass index is 31.98 kg/m .    GENERAL: alert and no distress  EYES: Eyes grossly normal to inspection, PERRL and conjunctivae and sclerae normal  HENT: ear canals and TM's normal, nose and mouth without ulcers or lesions  NECK: no adenopathy, no asymmetry, masses, or scars  RESP: lungs clear to auscultation - no rales, rhonchi or wheezes  CV: regular rate and rhythm, normal S1 S2, no S3 or S4, no murmur, click or rub, no peripheral edema  ABDOMEN: soft, nontender, no hepatosplenomegaly, no masses and bowel sounds normal  MS: no gross musculoskeletal defects noted, no edema  SKIN: no suspicious lesions or rashes, body skin exam not performed  NEURO: Normal strength and tone, mentation intact and speech normal  PSYCH: mentation appears normal, affect normal/bright        Margo Courtney MD

## 2024-06-18 LAB
BACTERIA UR CULT: NORMAL
C TRACH DNA SPEC QL NAA+PROBE: NEGATIVE
HBV SURFACE AG SERPL QL IA: NONREACTIVE
HIV 1+2 AB+HIV1 P24 AG SERPL QL IA: NONREACTIVE
N GONORRHOEA DNA SPEC QL NAA+PROBE: NEGATIVE
RUBV IGG SERPL QL IA: 7.01 INDEX
RUBV IGG SERPL QL IA: POSITIVE
T PALLIDUM AB SER QL: NONREACTIVE
TSH SERPL DL<=0.005 MIU/L-ACNC: 0.55 UIU/ML (ref 0.3–4.2)
VIT D+METAB SERPL-MCNC: 23 NG/ML (ref 20–50)

## 2024-08-23 ENCOUNTER — E-VISIT (OUTPATIENT)
Dept: FAMILY MEDICINE | Facility: CLINIC | Age: 29
End: 2024-08-23
Payer: COMMERCIAL

## 2024-08-23 DIAGNOSIS — L22 DIAPER RASH: Primary | ICD-10-CM

## 2024-08-23 PROCEDURE — 99421 OL DIG E/M SVC 5-10 MIN: CPT | Performed by: FAMILY MEDICINE

## 2024-08-25 RX ORDER — CLOTRIMAZOLE 1 %
CREAM (GRAM) TOPICAL 2 TIMES DAILY
Qty: 30 G | Refills: 1 | Status: SHIPPED | OUTPATIENT
Start: 2024-08-25 | End: 2024-08-29

## 2024-08-29 ENCOUNTER — PRENATAL OFFICE VISIT (OUTPATIENT)
Dept: FAMILY MEDICINE | Facility: CLINIC | Age: 29
End: 2024-08-29
Payer: COMMERCIAL

## 2024-08-29 VITALS
WEIGHT: 152 LBS | DIASTOLIC BLOOD PRESSURE: 62 MMHG | SYSTOLIC BLOOD PRESSURE: 121 MMHG | RESPIRATION RATE: 16 BRPM | BODY MASS INDEX: 31.77 KG/M2 | TEMPERATURE: 98.3 F | HEART RATE: 90 BPM

## 2024-08-29 DIAGNOSIS — Z34.82 ENCOUNTER FOR SUPERVISION OF OTHER NORMAL PREGNANCY IN SECOND TRIMESTER: ICD-10-CM

## 2024-08-29 DIAGNOSIS — E03.9 ACQUIRED HYPOTHYROIDISM: ICD-10-CM

## 2024-08-29 DIAGNOSIS — Z86.32 HISTORY OF GESTATIONAL DIABETES MELLITUS: ICD-10-CM

## 2024-08-29 DIAGNOSIS — B37.31 YEAST VAGINITIS: Primary | ICD-10-CM

## 2024-08-29 LAB
GLUCOSE 1H P 50 G GLC PO SERPL-MCNC: 185 MG/DL (ref 70–129)
HBA1C MFR BLD: 5 % (ref 0–5.6)
HGB BLD-MCNC: 11.4 G/DL (ref 11.7–15.7)

## 2024-08-29 PROCEDURE — 99207 PR PRENATAL VISIT: CPT | Performed by: FAMILY MEDICINE

## 2024-08-29 PROCEDURE — 84443 ASSAY THYROID STIM HORMONE: CPT | Performed by: FAMILY MEDICINE

## 2024-08-29 PROCEDURE — 83036 HEMOGLOBIN GLYCOSYLATED A1C: CPT | Performed by: FAMILY MEDICINE

## 2024-08-29 PROCEDURE — 36415 COLL VENOUS BLD VENIPUNCTURE: CPT | Performed by: FAMILY MEDICINE

## 2024-08-29 PROCEDURE — 85018 HEMOGLOBIN: CPT | Performed by: FAMILY MEDICINE

## 2024-08-29 PROCEDURE — 82950 GLUCOSE TEST: CPT | Performed by: FAMILY MEDICINE

## 2024-08-29 NOTE — PROGRESS NOTES
A/P: 29-year-old  3 para 2-0-0-2 female at 22-3/7 weeks gestation.  O+ blood type.  Past history of gestational diabetes in first pregnancy, not in second pregnancy.  Positive vaginal yeast infection.  Hypothyroid.  Obese.    Today we are doing the 1 hour glucose, A1c, TSH and hemoglobin.  For the vaginal yeast infection it is a 3 night treatment.  Please asked the pharmacist but I think it is a vaginal suppository for 3 nights and external cream external cream would be twice a day as needed.  Ordered OB ultrasound for full fetal survey.  They should call you if they do not call [phone].  With pregravid body mass index at 32, she will need extra monitoring at the end of pregnancy. Will get a biophysical profile ultrasound at 36 weeks and start weekly nonstress test at 36 weeks.   For acid reflux, burping or stomach upset, you can take Tums max of 4 in a day if 500 mg tabs, or max of 2000 mg per day, and Pepcid 20 mg twice a day as needed. If that is not helping, Dr. Courtney can prescribe Protonix.   Please ensure you are having 2300 calories a day.  We will monitor your weight.  In somebody with a body mass index over 30 prior to pregnancy would expect 15 to 20 pounds of total weight gain.  Follow-up in 1 month.    S: Patient feels fetal movement.  Denies regular contractions.  Denies lower extremity edema.  Denies vaginal bleeding.   She was in the [facility] the last couple of months.  She was not exposed to tuberculosis.   Came back from [facility] and was sweating a lot while there and always thirsty.  Eating 3 meals, but always thirsty, so was not eating as much because drinking more water.  Better now since home.  Feels she has a vaginal yeast infection with internal and external vaginal vulvar itching and white thick vaginal discharge.  While she was in the [facility] use some type of gynecological wipes but has not been treated with an antifungal.    GERD:  Using Tums.  Not started using Pepcid  yet.    While she was in the [facility] on July 3 she had labs to include a TSH at 1.31 and A1c at 5.9.      O:  DTR 2+, see OB flowsheet

## 2024-08-29 NOTE — PATIENT INSTRUCTIONS
Today we are doing the 1 hour glucose, A1c, TSH and hemoglobin.    For the vaginal yeast infection it is a 3 night treatment.  Please asked the pharmacist but I think it is a vaginal suppository for 3 nights and external cream external cream would be twice a day as needed.    Ordered OB ultrasound for full fetal survey.  They should call you if they do not call [phone].    With pregravid body mass index at 32, she will need extra monitoring at the end of pregnancy. Will get a biophysical profile ultrasound at 36 weeks and start weekly nonstress test at 36 weeks.     For acid reflux, burping or stomach upset, you can take Tums max of 4 in a day if 500 mg tabs, or max of 2000 mg per day, and Pepcid 20 mg twice a day as needed. If that is not helping, Dr. Courtney can prescribe Protonix.     Please ensure you are having 2300 calories a day.  We will monitor your weight.  In somebody with a body mass index over 30 prior to pregnancy would expect 15 to 20 pounds of total weight gain.    Follow-up in 1 month.

## 2024-08-30 DIAGNOSIS — E03.9 ACQUIRED HYPOTHYROIDISM: ICD-10-CM

## 2024-08-30 DIAGNOSIS — Z86.32 HISTORY OF GESTATIONAL DIABETES MELLITUS: Primary | ICD-10-CM

## 2024-08-30 LAB — TSH SERPL DL<=0.005 MIU/L-ACNC: 3.82 UIU/ML (ref 0.3–4.2)

## 2024-08-30 RX ORDER — LEVOTHYROXINE SODIUM 150 UG/1
150 TABLET ORAL DAILY
Qty: 90 TABLET | Refills: 1 | Status: SHIPPED | OUTPATIENT
Start: 2024-08-30

## 2024-09-03 ENCOUNTER — HOSPITAL ENCOUNTER (OUTPATIENT)
Dept: ULTRASOUND IMAGING | Facility: HOSPITAL | Age: 29
Discharge: HOME | End: 2024-09-03
Attending: FAMILY MEDICINE | Admitting: FAMILY MEDICINE
Payer: COMMERCIAL

## 2024-09-03 DIAGNOSIS — Z34.82 ENCOUNTER FOR SUPERVISION OF OTHER NORMAL PREGNANCY IN SECOND TRIMESTER: ICD-10-CM

## 2024-09-03 PROCEDURE — 76805 OB US >/= 14 WKS SNGL FETUS: CPT

## 2024-09-04 ENCOUNTER — LAB (OUTPATIENT)
Dept: LAB | Facility: CLINIC | Age: 29
End: 2024-09-04
Payer: COMMERCIAL

## 2024-09-04 DIAGNOSIS — Z86.32 HISTORY OF GESTATIONAL DIABETES MELLITUS: ICD-10-CM

## 2024-09-04 LAB
GESTATIONAL GTT 1 HR POST DOSE: 187 MG/DL (ref 60–179)
GESTATIONAL GTT 2 HR POST DOSE: 175 MG/DL (ref 60–154)
GESTATIONAL GTT 3 HR POST DOSE: 157 MG/DL (ref 60–139)
GLUCOSE P FAST SERPL-MCNC: 75 MG/DL (ref 60–94)

## 2024-09-04 PROCEDURE — 82951 GLUCOSE TOLERANCE TEST (GTT): CPT

## 2024-09-04 PROCEDURE — 82952 GTT-ADDED SAMPLES: CPT

## 2024-09-04 PROCEDURE — 36415 COLL VENOUS BLD VENIPUNCTURE: CPT

## 2024-09-05 DIAGNOSIS — O24.419 GESTATIONAL DIABETES MELLITUS (GDM) IN SECOND TRIMESTER, GESTATIONAL DIABETES METHOD OF CONTROL UNSPECIFIED: Primary | ICD-10-CM

## 2024-09-09 ENCOUNTER — TELEPHONE (OUTPATIENT)
Dept: EDUCATION SERVICES | Facility: CLINIC | Age: 29
End: 2024-09-09
Payer: COMMERCIAL

## 2024-09-09 NOTE — TELEPHONE ENCOUNTER
Left Voicemail (1st Attempt) and Sent Mychart (1st Attempt) for the patient to call back and schedule the following:    Appointment type: pre exist diabetes preg or diabetes ed   Provider: any CDE  Return date: SCHEDULE PRIOR TO 9/19   Specialty phone number: [phone]  Additional appointment(s) needed:   Additonal Notes:   Xiomara Mccoy, RN  [id number]   Pt will need CDE visit prior to Sept 19 visit with Yolis Ardon. Please and thank you.    OK TO USE USE RESOURCE SPOTS     Please note that the above appointment(s) will require manual scheduling as they are marked as ELIF and will not appear using auto search. Do not schedule the patient if another patient has already been scheduled in the requested appointment slot.       Kacie Callaway on 9/9/2024 at 3:08 PM

## 2024-09-10 ENCOUNTER — TELEPHONE (OUTPATIENT)
Dept: EDUCATION SERVICES | Facility: CLINIC | Age: 29
End: 2024-09-10
Payer: COMMERCIAL

## 2024-09-10 DIAGNOSIS — O24.419 GESTATIONAL DIABETES MELLITUS (GDM), ANTEPARTUM, GESTATIONAL DIABETES METHOD OF CONTROL UNSPECIFIED: Primary | ICD-10-CM

## 2024-09-10 RX ORDER — LANCETS
EACH MISCELLANEOUS
Qty: 100 EACH | Refills: 6 | Status: SHIPPED | OUTPATIENT
Start: 2024-09-10

## 2024-09-10 NOTE — TELEPHONE ENCOUNTER
Spoke with patient.  She is not checking glucose at this time.  Will send supplies to pharmacy.  Discussed goals and when to call before her appointment with teo.

## 2024-09-13 ENCOUNTER — TELEPHONE (OUTPATIENT)
Dept: EDUCATION SERVICES | Facility: CLINIC | Age: 29
End: 2024-09-13
Payer: COMMERCIAL

## 2024-09-13 NOTE — TELEPHONE ENCOUNTER
Date  Ketones FBG 1 hours post Breakfast 1 hour post lunch    1 hours post dinner   9/12  100 134 108 148   9/13  104 136 180/171/183      I returned patients call.  I reviewed with her different reasons why she might see elevated blood sugar levels.  She does note that today for lunch she said she had half of what she normally eats.  I reviewed with her how not eating enough carbohydrates can actually cause blood sugars to go high.  We also discussed washing hands before doing a blood sugar check and she noted that before her after lunch check she had just handled a candy bar for her daughter and not washed her hands.  She is not eating a bedtime snack, so we reviewed the importance of the bedtime snack and how it helps with those fasting blood sugar levels.  She plans to add this in.  No further questions at this time.  She has first appt in the pregnancy clinic next week.    Leidy Barrios RN, [facility]

## 2024-09-13 NOTE — TELEPHONE ENCOUNTER
[facility] Call Center    Phone Message    May a detailed message be left on voicemail: yes     Reason for Call: Other: patient stated that she is having weird readings on her accuchek and requesting a call back at [phone] thank you     Action Taken: Message routed to:  [facility] ([facility]):      Travel Screening: Not Applicable     Date of Service:

## 2024-09-13 NOTE — Clinical Note
Belen- janay ROSEN I am a little puzzled by this one. Not on insulin, but scheduled with you and Yolis in preg clinic next week. I'm guessing you have it all figured out since you scheduled it, but just wanted to send you this update.  Leidy Barrios RN, [facility]

## 2024-09-19 ENCOUNTER — APPOINTMENT (OUTPATIENT)
Dept: EDUCATION SERVICES | Facility: CLINIC | Age: 29
End: 2024-09-19
Payer: COMMERCIAL

## 2024-09-25 ENCOUNTER — TELEPHONE (OUTPATIENT)
Dept: EDUCATION SERVICES | Facility: CLINIC | Age: 29
End: 2024-09-25
Payer: COMMERCIAL

## 2024-09-25 DIAGNOSIS — O24.419 GESTATIONAL DIABETES MELLITUS (GDM), ANTEPARTUM, GESTATIONAL DIABETES METHOD OF CONTROL UNSPECIFIED: Primary | ICD-10-CM

## 2024-09-26 DIAGNOSIS — O24.419 GESTATIONAL DIABETES MELLITUS (GDM), ANTEPARTUM, GESTATIONAL DIABETES METHOD OF CONTROL UNSPECIFIED: ICD-10-CM

## 2024-09-26 RX ORDER — INSULIN DEGLUDEC 100 U/ML
INJECTION, SOLUTION SUBCUTANEOUS
Refills: 0 | OUTPATIENT
Start: 2024-09-26

## 2024-09-29 ENCOUNTER — HEALTH MAINTENANCE LETTER (OUTPATIENT)
Age: 29
End: 2024-09-29

## 2024-09-30 ENCOUNTER — PRENATAL OFFICE VISIT (OUTPATIENT)
Dept: FAMILY MEDICINE | Facility: CLINIC | Age: 29
End: 2024-09-30
Payer: COMMERCIAL

## 2024-09-30 VITALS
DIASTOLIC BLOOD PRESSURE: 58 MMHG | TEMPERATURE: 97.9 F | HEART RATE: 99 BPM | RESPIRATION RATE: 16 BRPM | SYSTOLIC BLOOD PRESSURE: 106 MMHG | WEIGHT: 153.8 LBS | BODY MASS INDEX: 32.14 KG/M2 | OXYGEN SATURATION: 97 %

## 2024-09-30 DIAGNOSIS — B96.89 BV (BACTERIAL VAGINOSIS): Primary | ICD-10-CM

## 2024-09-30 DIAGNOSIS — N76.0 BV (BACTERIAL VAGINOSIS): Primary | ICD-10-CM

## 2024-09-30 DIAGNOSIS — E03.9 ACQUIRED HYPOTHYROIDISM: ICD-10-CM

## 2024-09-30 DIAGNOSIS — O09.92 SUPERVISION OF HIGH RISK PREGNANCY IN SECOND TRIMESTER: Primary | ICD-10-CM

## 2024-09-30 DIAGNOSIS — O24.414 INSULIN CONTROLLED GESTATIONAL DIABETES MELLITUS (GDM) IN SECOND TRIMESTER: ICD-10-CM

## 2024-09-30 LAB
CLUE CELLS: PRESENT
EST. AVERAGE GLUCOSE BLD GHB EST-MCNC: 108 MG/DL
HBA1C MFR BLD: 5.4 % (ref 0–5.6)
HGB BLD-MCNC: 11.5 G/DL (ref 11.7–15.7)
TRICHOMONAS, WET PREP: ABNORMAL
TSH SERPL DL<=0.005 MIU/L-ACNC: 0.35 UIU/ML (ref 0.3–4.2)
WBC'S/HIGH POWER FIELD, WET PREP: ABNORMAL
YEAST, WET PREP: ABNORMAL

## 2024-09-30 PROCEDURE — 36415 COLL VENOUS BLD VENIPUNCTURE: CPT | Performed by: FAMILY MEDICINE

## 2024-09-30 PROCEDURE — 87210 SMEAR WET MOUNT SALINE/INK: CPT | Performed by: FAMILY MEDICINE

## 2024-09-30 PROCEDURE — 99207 PR PRENATAL VISIT: CPT | Performed by: FAMILY MEDICINE

## 2024-09-30 PROCEDURE — 84443 ASSAY THYROID STIM HORMONE: CPT | Performed by: FAMILY MEDICINE

## 2024-09-30 PROCEDURE — 83036 HEMOGLOBIN GLYCOSYLATED A1C: CPT | Performed by: FAMILY MEDICINE

## 2024-09-30 PROCEDURE — 85018 HEMOGLOBIN: CPT | Performed by: FAMILY MEDICINE

## 2024-09-30 RX ORDER — METRONIDAZOLE 7.5 MG/G
1 GEL VAGINAL DAILY
Qty: 70 G | Refills: 0 | Status: SHIPPED | OUTPATIENT
Start: 2024-09-30

## 2024-09-30 NOTE — PATIENT INSTRUCTIONS
Please check at your pharmacy for the insulin.    Following with endocrine,    With GDM on insulin, will start extra testing at 34 weeks.    We will add a 35 week apt.    Ordered ultrasound for growth.    Today, A1C, Hgb and thyroid and wet prep for infection.    Declined flu and Covid shots.    Follow-up in 2 weeks.

## 2024-09-30 NOTE — PROGRESS NOTES
A/P: 29-year-old  3 para 2-0-0-2 female at 27-0/7 weeks gestation.  O+ blood type.  Gestational diabetes on insulin.  Hypothyroid.  Mild anemia.    Please check at your pharmacy for the insulin.  Following with endocrine.  With GDM on insulin, will start extra testing at 34 weeks.  We will add a 35 week apt.  Ordered ultrasound for growth.  Today, A1C, Hgb and thyroid and wet prep for infection.  Declined flu and Covid shots.  Follow-up in 2 weeks.    S: Patient feels good fetal movement.  Denies regular contractions.  Denies lower extremity edema.  Denies vaginal bleeding.  Feeling internal vaginal summation.  Used 3 days Monistat and since then has noticed the swelling.  No vaginal discharge or itching.    GDM, saw endocrine 24 and started on Insulin and not at pharamcy on 24.  Looks like it was sent 24.  She will check with the pharmacy again.    O:  DTR 2+, see OB flowsheet       [facility]    Zoe History and Physical        Cesia Albarado Patient Status:  Zoe    10/9/2024 MRN [id number]   Location [facility]  3SE-N Attending Mana Hernandez MD   [facility] Day # 0 PCP    Consultant No primary care provider on file. Date of Admission:  10/9/2023  History of Pesent Illness:   Cesia Albarado is a(n) Weight: 2.95 kg (6 lb 8.1 oz) (Filed from Delivery Summary) female infant.     Date of Delivery: 10/9/2023  Time of Delivery: 6:43 AM  Delivery Type: Normal spontaneous vaginal delivery      Maternal History:   Maternal Information:  Information for the patient's mother: Eliot Mccullough [[id number]]  32year old  Information for the patient's mother: Eliot Mccullough [[id number]]      Pertinent Maternal Prenatal Labs:  Prenatal Results  Mother: Eliot Mccullough #[id number]     Start of Mother's Information      Prenatal Results      1st Trimester Labs ([facility] 5-19X)       Test Value Reference Range Date Time    ABO Grouping OB  O   10/08/23 1756    RH Factor OB  Negative   10/08/23 1756    Antibody Screen OB  Negative   23    HCT  30.3 % 35.0 - 48.0 23    HGB  10.6 g/dL 12.0 - 16.0 23    MCV  87.8 fL 80.0 - 100.0 23    Platelets  244.3 90(9).0 - 450.0 23    Rubella Titer OB  Positive  Positive 23    Serology (RPR) OB        TREP  Negative  Negative 23    Urine Culture  No Growth at 18-24 hrs.   23    Hep B Surf Ag OB  Nonreactive  Nonreactive  23    HIV Result OB        HIV Combo  Non-Reactive  Non-Reactive 23    5th Gen HIV - DMG        HCV (Hep C)  Nonreactive  Nonreactive  23          3rd Trimester Labs (GA 24-41w)       Test Value Reference Range Date Time    HCT  36.2 % 35.0 - 48.0 10/08/23 1756       33.6 % 35.0 - 48.0 08/10/23 1008    HGB  12.6 g/dL 12.0 - 16.0 10/08/23 1756       11.5 g/dL 12.0 - 16.0 08/10/23 1008    Platelets  712.3 93(5)RL 150.0 - 450.0 10/08/23 1756       372.0 10(3)uL 150.0 - 450.0 08/10/23 1008    TREP  Negative  Negative 08/10/23 1008    Group B Strep Culture        Group B Strep OB        GBS-DMG        HIV Result OB        HIV Combo Result  Non-Reactive  Non-Reactive 08/10/23 1008    5th Gen HIV - DMG        HCV (Hep C)        TSH        COVID19 Infection              Genetic Screening (0-45w)       Test Value Reference Range Date Time    1st Trimester Aneuploidy Risk Assessment        Quad - Down Screen Risk Estimate (Required questions in OE to answer)        Quad - Down Maternal Age Risk (Required questions in OE to answer)        Quad - Trisomy 18 screen Risk Estimate (Required questions in OE to answer)        AFP Spina Bifida (Required questions in OE to answer )        Genetic testing        Genetic testing        Genetic testing              Legend    ^: Historical                      End of Mother's Information  Mother: Ana Edwards #[id number]                      Delivery Information:     Pregnancy complications: none   complications: none    Reason for C/S:      Rupture Date: 10/8/2023  Rupture Time: 2:45 PM  Rupture Type: SROM  Fluid Color: Clear  Induction:    Augmentation: Oxytocin  Complications:      Apgars:  1 minute:   8                 5 minutes: 9                          10 minutes:     Resuscitation:     Blood Type  Lab Results   Component Value Date    ABO B 10/09/2023    RH Positive 10/09/2023         Physical Exam:   Birth Weight: Weight: 2.95 kg (6 lb 8.1 oz) (Filed from Delivery Summary)  Birth Length: Height: 19.29\" (Filed from Delivery Summary)  Birth Head Circumference: Head Circumference: 34 cm (Filed from Delivery Summary)  Current Weight: Weight: 2.95 kg (6 lb 8.1 oz) (Filed from Delivery Summary)  Weight Change Percentage Since Birth: 0%    General appearance: Alert, active in no distress  Head: Normocephalic and anterior fontanelle flat and soft   Eye: Pupils equal, round, reactive to light and Red reflex present bilaterally  Ear: Normal position and Canals patent bilaterally  Nose: Nares patent bilaterally  Mouth: Oral mucosa moist and palate intact  Neck:  supple, trachea midline  Respiratory: Normal respiratory rate and Clear to auscultation bilaterally  Cardiac: Regular rate and rhythm and no murmur, normal femoral pulses  Abdominal: soft, non distended, no hepatosplenomegaly, no masses, normal bowel sounds and anus patent  Genitourinary:normal infant female  Spine: spine intact and no sacral dimples, no hair lan   Extremities: no abnormalties  Musculoskeletal: spontaneous movement of all extremities bilaterally and full and symmetric abduction of hips bilaterally with negative Ortolani and Yancey maneuvers  Dermatologic: pink and no jaundice  Neurologic: normal tone, normal talya reflex, normal grasp and no focal deficits  Psychiatric: alert    Results:     No results found for: \"WBC\", \"HGB\", \"HCT\", \"PLT\", \"CREATSERUM\", \"BUN\", \"NA\", \"K\", \"CL\", \"CO2\", \"GLU\", \"CA\", \"ALB\", \"ALKPHO\", \"TP\", \"AST\", \"ALT\", \"PTT\", \"INR\", \"PTP\", \"T4F\", \"TSH\", \"TSHREFLEX\", \"ROBINA\", \"LIP\", \"GGT\", \"PSA\", \"DDIMER\", \"ESRML\", \"ESRPF\", \"CRP\", \"BNP\", \"MG\", \"PHOS\", \"TROP\", \"CK\", \"CKMB\", \"CLIFTON\", \"RPR\", \"B12\", \"ETOH\", \"POCGLU\"        Assessment and Plan:     Patient is a Gestational Age: 37w2d,  ,  female    Active Problems:         infant of 39 completed weeks of gestation      Plan:  Healthy appearing infant admitted to  nursery  Normal  care, encourage feeding every 2-3 hours. Vitamin K and EES given  Monitor jaundice pattern, Bili levels to be done per routine.  screen and hearing screen and CCHD to be done prior to discharge. Discussed anticipatory guidance and concerns with parent(s)      Tami Chaudhari.  Jessie Walters MD  10/09/23

## 2024-10-03 PROBLEM — J45.909 ASTHMA: Status: ACTIVE | Noted: 2022-10-11

## 2024-10-08 ENCOUNTER — HOSPITAL ENCOUNTER (OUTPATIENT)
Dept: ULTRASOUND IMAGING | Facility: HOSPITAL | Age: 29
Discharge: HOME | End: 2024-10-08
Attending: FAMILY MEDICINE | Admitting: FAMILY MEDICINE
Payer: COMMERCIAL

## 2024-10-08 DIAGNOSIS — O24.414 INSULIN CONTROLLED GESTATIONAL DIABETES MELLITUS (GDM) IN SECOND TRIMESTER: ICD-10-CM

## 2024-10-08 PROCEDURE — 76816 OB US FOLLOW-UP PER FETUS: CPT

## 2024-10-14 ENCOUNTER — PRENATAL OFFICE VISIT (OUTPATIENT)
Dept: FAMILY MEDICINE | Facility: CLINIC | Age: 29
End: 2024-10-14
Payer: COMMERCIAL

## 2024-10-14 VITALS
TEMPERATURE: 98.3 F | OXYGEN SATURATION: 98 % | DIASTOLIC BLOOD PRESSURE: 61 MMHG | WEIGHT: 155.8 LBS | BODY MASS INDEX: 32.56 KG/M2 | SYSTOLIC BLOOD PRESSURE: 101 MMHG | RESPIRATION RATE: 16 BRPM | HEART RATE: 101 BPM

## 2024-10-14 DIAGNOSIS — E03.9 ACQUIRED HYPOTHYROIDISM: ICD-10-CM

## 2024-10-14 DIAGNOSIS — O24.410 DIET CONTROLLED GESTATIONAL DIABETES MELLITUS (GDM) IN THIRD TRIMESTER: ICD-10-CM

## 2024-10-14 DIAGNOSIS — N76.0 BV (BACTERIAL VAGINOSIS): ICD-10-CM

## 2024-10-14 DIAGNOSIS — Z34.82 ENCOUNTER FOR SUPERVISION OF OTHER NORMAL PREGNANCY IN SECOND TRIMESTER: Primary | ICD-10-CM

## 2024-10-14 DIAGNOSIS — B96.89 BV (BACTERIAL VAGINOSIS): ICD-10-CM

## 2024-10-14 LAB
ALBUMIN UR-MCNC: NEGATIVE MG/DL
APPEARANCE UR: ABNORMAL
BILIRUB UR QL STRIP: NEGATIVE
COLOR UR AUTO: YELLOW
EST. AVERAGE GLUCOSE BLD GHB EST-MCNC: 105 MG/DL
GLUCOSE UR STRIP-MCNC: NEGATIVE MG/DL
HBA1C MFR BLD: 5.3 % (ref 0–5.6)
HGB BLD-MCNC: 11.4 G/DL (ref 11.7–15.7)
HGB UR QL STRIP: NEGATIVE
KETONES UR STRIP-MCNC: NEGATIVE MG/DL
LEUKOCYTE ESTERASE UR QL STRIP: ABNORMAL
NITRATE UR QL: NEGATIVE
PH UR STRIP: 7 [PH] (ref 5–8)
SP GR UR STRIP: 1.02 (ref 1–1.03)
UROBILINOGEN UR STRIP-ACNC: 0.2 E.U./DL

## 2024-10-14 PROCEDURE — 84443 ASSAY THYROID STIM HORMONE: CPT | Performed by: FAMILY MEDICINE

## 2024-10-14 PROCEDURE — 36415 COLL VENOUS BLD VENIPUNCTURE: CPT | Performed by: FAMILY MEDICINE

## 2024-10-14 PROCEDURE — 99207 PR PRENATAL VISIT: CPT | Performed by: FAMILY MEDICINE

## 2024-10-14 PROCEDURE — 85018 HEMOGLOBIN: CPT | Performed by: FAMILY MEDICINE

## 2024-10-14 PROCEDURE — 81003 URINALYSIS AUTO W/O SCOPE: CPT | Performed by: FAMILY MEDICINE

## 2024-10-14 PROCEDURE — 83036 HEMOGLOBIN GLYCOSYLATED A1C: CPT | Performed by: FAMILY MEDICINE

## 2024-10-14 RX ORDER — METRONIDAZOLE 7.5 MG/G
1 GEL VAGINAL AT BEDTIME
Qty: 70 G | Refills: 0 | Status: SHIPPED | OUTPATIENT
Start: 2024-10-14 | End: 2024-10-19

## 2024-10-14 NOTE — PATIENT INSTRUCTIONS
I am glad you are following with diabetic education and your blood sugars look good.      Today we will check a hemoglobin, thyroid and A1c.    Recommend 64-96 oz of non-caffenated fluid per day.    Last visit the wet prep your vaginal swab showed a vaginal infection called bacterial vaginosis.  If left untreated it is possible it could cause  labor would like to use the MetroGel vaginal for 5 nights.    Ordered OB ultrasound to be done about  or so.  We do this monthly for growth when someone has diabetes.    I am also glad you are following with endocrinology.    Follow-up every 2 weeks now and at 34 weeks it will be weekly as we will be doing weekly monitoring.    Declined flu and Covid shots.     Follow-up in 2 weeks.    Will offer RSV shot 32 to 36 weeks if interested.    Starting at 34 weeks we will do weekly MD visits, weekly nonstress tests.    If patient is needing insulin would up the  testing to twice a week.    OB ultrasound with biophysical profile and growth monthly.    Discuss induction at 39 weeks.

## 2024-10-14 NOTE — PROGRESS NOTES
A/P: 29-year-old  3 para 2-0-0-2 female at 29-0/7 weeks gestation.  O+ blood type.  Diet-controlled gestational diabetes.  Obesity.  Hypothyroid.    I am glad you are following with diabetic education and your blood sugars look good.      Today we will check a hemoglobin, thyroid and A1c.    Recommend 64-96 oz of non-caffenated fluid per day.    Last visit the wet prep your vaginal swab showed a vaginal infection called bacterial vaginosis.  If left untreated it is possible it could cause  labor would like to use the MetroGel vaginal for 5 nights.    Ordered OB ultrasound to be done about  or so.  We do this monthly for growth when someone has diabetes.    I am also glad you are following with endocrinology.    Follow-up every 2 weeks now and at 34 weeks it will be weekly as we will be doing weekly monitoring.    Declined flu and Covid shots.     Follow-up in 2 weeks.    Will offer RSV shot 32 to 36 weeks if interested.    Starting at 34 weeks we will do weekly MD visits, weekly nonstress tests.    If patient is needing insulin would up the  testing to twice a week.    OB ultrasound with biophysical profile and growth monthly.    Discuss induction at 39 weeks.    S: Patient feels good fetal movement.  Denies regular contractions.  Denies lower extremity edema.  Denies vaginal bleeding.  All fasting gluse under 95, pos pransial nder 140.  No insuling needednow.    O:  DTR 2+, UA-cloudy, Tr LE, Hgb 11.4, A1C 5.3, see OB flowsheet

## 2024-10-15 LAB — TSH SERPL DL<=0.005 MIU/L-ACNC: 0.39 UIU/ML (ref 0.3–4.2)

## 2024-10-28 ENCOUNTER — PRENATAL OFFICE VISIT (OUTPATIENT)
Dept: FAMILY MEDICINE | Facility: CLINIC | Age: 29
End: 2024-10-28
Payer: COMMERCIAL

## 2024-10-28 VITALS
BODY MASS INDEX: 32.73 KG/M2 | WEIGHT: 156.6 LBS | DIASTOLIC BLOOD PRESSURE: 58 MMHG | RESPIRATION RATE: 16 BRPM | OXYGEN SATURATION: 98 % | SYSTOLIC BLOOD PRESSURE: 106 MMHG | HEART RATE: 68 BPM | TEMPERATURE: 97.8 F

## 2024-10-28 DIAGNOSIS — Z34.82 ENCOUNTER FOR SUPERVISION OF OTHER NORMAL PREGNANCY IN SECOND TRIMESTER: Primary | ICD-10-CM

## 2024-10-28 DIAGNOSIS — N76.0 VAGINITIS AND VULVOVAGINITIS: ICD-10-CM

## 2024-10-28 LAB
ALBUMIN UR-MCNC: NEGATIVE MG/DL
APPEARANCE UR: CLEAR
BILIRUB UR QL STRIP: NEGATIVE
CLUE CELLS: ABNORMAL
COLOR UR AUTO: YELLOW
GLUCOSE UR STRIP-MCNC: NEGATIVE MG/DL
HGB UR QL STRIP: NEGATIVE
KETONES UR STRIP-MCNC: NEGATIVE MG/DL
LEUKOCYTE ESTERASE UR QL STRIP: ABNORMAL
NITRATE UR QL: NEGATIVE
PH UR STRIP: 7.5 [PH] (ref 5–8)
SP GR UR STRIP: 1.02 (ref 1–1.03)
TRICHOMONAS, WET PREP: ABNORMAL
UROBILINOGEN UR STRIP-ACNC: 1 E.U./DL
WBC'S/HIGH POWER FIELD, WET PREP: ABNORMAL
YEAST, WET PREP: PRESENT

## 2024-10-28 PROCEDURE — 87210 SMEAR WET MOUNT SALINE/INK: CPT | Performed by: FAMILY MEDICINE

## 2024-10-28 PROCEDURE — 81003 URINALYSIS AUTO W/O SCOPE: CPT | Performed by: FAMILY MEDICINE

## 2024-10-28 PROCEDURE — 99207 PR PRENATAL VISIT: CPT | Performed by: FAMILY MEDICINE

## 2024-10-28 NOTE — PATIENT INSTRUCTIONS
Next visit in about 2 weeks, at that visit we will recheck hemoglobin, A1c and thyroid.    OB ultrasound is ordered, if they do not call you you can call [phone] to set that up.    I am glad you are following with diabetic education and your blood sugars look good.      Wet prep today to check for vaginal infection.     I am also glad you are following with endocrinology.     Follow-up every 2 weeks now and at 34 weeks it will be weekly as we will be doing weekly monitoring.     Declined flu and Covid shots.     Will offer RSV shot 32 to 36 weeks if interested.     Starting at 34 weeks we will do weekly MD visits, weekly nonstress tests.     If patient is needing insulin would up the  testing to twice a week.     Discuss induction at 39 weeks.

## 2024-10-28 NOTE — PROGRESS NOTES
A/P: 29-year-old  3 para 2-0-0-2 female at 31-0/7 weeks gestation.  O+ blood type.  Gestational diabetes, diet-controlled.  Hypothyroid.  Vitamin D deficient.  Obese.    Next visit in about 2 weeks, at that visit we will recheck hemoglobin, A1c and thyroid.    OB ultrasound is ordered, if they do not call you you can call [phone] to set that up.    I am glad you are following with diabetic education and your blood sugars look good.      Wet prep today to check for vaginal infection.     I am also glad you are following with endocrinology.     Follow-up every 2 weeks now and at 34 weeks it will be weekly as we will be doing weekly monitoring.     Declined flu and Covid shots.     Will offer RSV shot 32 to 36 weeks if interested.     Starting at 34 weeks we will do weekly MD visits, weekly nonstress tests.     If patient is needing insulin would up the  testing to twice a week.     Discuss induction at 39 weeks.    S: Patient feels good fetal movement.  Denies regular contractions.  Denies lower extremity edema.  Denies vaginal bleeding.  She had an ultrasound  that showed both at the 34th percentile and breech.  She has another OB ultrasound ordered.  She had had a bacterial vaginosis infection  and did treat.  She is now having some white vaginal discharge and itching.    O:  DTR 2+, see OB flowsheet

## 2024-11-06 ENCOUNTER — E-VISIT (OUTPATIENT)
Dept: URGENT CARE | Facility: CLINIC | Age: 29
End: 2024-11-06
Payer: COMMERCIAL

## 2024-11-06 ENCOUNTER — LAB (OUTPATIENT)
Dept: LAB | Facility: CLINIC | Age: 29
End: 2024-11-06
Payer: COMMERCIAL

## 2024-11-06 DIAGNOSIS — R30.0 DYSURIA: Primary | ICD-10-CM

## 2024-11-06 DIAGNOSIS — N39.0 ACUTE UTI: Primary | ICD-10-CM

## 2024-11-06 DIAGNOSIS — R30.0 DYSURIA: ICD-10-CM

## 2024-11-06 LAB
ALBUMIN UR-MCNC: NEGATIVE MG/DL
APPEARANCE UR: CLEAR
BACTERIA #/AREA URNS HPF: ABNORMAL /HPF
BILIRUB UR QL STRIP: NEGATIVE
CLUE CELLS: NORMAL
COLOR UR AUTO: YELLOW
GLUCOSE UR STRIP-MCNC: NEGATIVE MG/DL
HGB UR QL STRIP: ABNORMAL
KETONES UR STRIP-MCNC: NEGATIVE MG/DL
LEUKOCYTE ESTERASE UR QL STRIP: ABNORMAL
MUCOUS THREADS #/AREA URNS LPF: PRESENT /LPF
NITRATE UR QL: NEGATIVE
PH UR STRIP: 7 [PH] (ref 5–8)
RBC #/AREA URNS AUTO: ABNORMAL /HPF
SP GR UR STRIP: 1.01 (ref 1–1.03)
SQUAMOUS #/AREA URNS AUTO: ABNORMAL /LPF
TRANS CELLS #/AREA URNS HPF: ABNORMAL /HPF
TRICHOMONAS, WET PREP: NORMAL
UROBILINOGEN UR STRIP-ACNC: 0.2 E.U./DL
WBC #/AREA URNS AUTO: ABNORMAL /HPF
WBC CLUMPS #/AREA URNS HPF: PRESENT /HPF
WBC'S/HIGH POWER FIELD, WET PREP: NORMAL
YEAST, WET PREP: NORMAL

## 2024-11-06 PROCEDURE — 87186 SC STD MICRODIL/AGAR DIL: CPT

## 2024-11-06 PROCEDURE — 87210 SMEAR WET MOUNT SALINE/INK: CPT

## 2024-11-06 PROCEDURE — 81001 URINALYSIS AUTO W/SCOPE: CPT

## 2024-11-06 PROCEDURE — 99421 OL DIG E/M SVC 5-10 MIN: CPT | Performed by: NURSE PRACTITIONER

## 2024-11-06 PROCEDURE — 87086 URINE CULTURE/COLONY COUNT: CPT

## 2024-11-06 PROCEDURE — 87088 URINE BACTERIA CULTURE: CPT

## 2024-11-06 RX ORDER — CEPHALEXIN 500 MG/1
500 CAPSULE ORAL 2 TIMES DAILY
Qty: 14 CAPSULE | Refills: 0 | Status: SHIPPED | OUTPATIENT
Start: 2024-11-06 | End: 2024-11-13

## 2024-11-06 NOTE — PATIENT INSTRUCTIONS
Dear Juan J Burgess,     After reviewing your responses, I would like you to come in for a urine test to make sure we treat you correctly. This urine test is to evaluate you for a possible urinary tract infection, and should be scheduled for today or tomorrow. Schedule a Lab Only appointment here.     Lab appointments are not available at most locations on the weekends. If no Lab Only appointment is available, you should be seen in any of our convenient Walk-in or Urgent Care Centers, which can be found on our website here.     You will receive instructions with your results in [facility] once they are available.     If your symptoms worsen, you develop pain in your back or stomach, develop fevers, or are not improving in 5 days, please contact your primary care provider for an appointment or visit a Walk-in or Urgent Care Center to be seen.     Thanks again for choosing us as your health care partner,     Jennifer Swain, CNP

## 2024-11-07 LAB — BACTERIA UR CULT: ABNORMAL

## 2024-11-08 ENCOUNTER — HOSPITAL ENCOUNTER (OUTPATIENT)
Dept: ULTRASOUND IMAGING | Facility: HOSPITAL | Age: 29
Discharge: HOME | End: 2024-11-08
Attending: FAMILY MEDICINE | Admitting: FAMILY MEDICINE
Payer: COMMERCIAL

## 2024-11-08 DIAGNOSIS — O24.410 DIET CONTROLLED GESTATIONAL DIABETES MELLITUS (GDM) IN THIRD TRIMESTER: ICD-10-CM

## 2024-11-08 PROCEDURE — 76820 UMBILICAL ARTERY ECHO: CPT

## 2024-11-11 ENCOUNTER — PRENATAL OFFICE VISIT (OUTPATIENT)
Dept: FAMILY MEDICINE | Facility: CLINIC | Age: 29
End: 2024-11-11
Payer: COMMERCIAL

## 2024-11-11 VITALS
HEART RATE: 85 BPM | WEIGHT: 155.6 LBS | SYSTOLIC BLOOD PRESSURE: 111 MMHG | BODY MASS INDEX: 32.52 KG/M2 | DIASTOLIC BLOOD PRESSURE: 60 MMHG | OXYGEN SATURATION: 99 % | RESPIRATION RATE: 16 BRPM

## 2024-11-11 DIAGNOSIS — O24.410 DIET CONTROLLED GESTATIONAL DIABETES MELLITUS (GDM) IN THIRD TRIMESTER: Primary | ICD-10-CM

## 2024-11-11 DIAGNOSIS — E03.9 ACQUIRED HYPOTHYROIDISM: ICD-10-CM

## 2024-11-11 DIAGNOSIS — Z34.82 ENCOUNTER FOR SUPERVISION OF OTHER NORMAL PREGNANCY IN SECOND TRIMESTER: ICD-10-CM

## 2024-11-11 LAB
ALBUMIN UR-MCNC: ABNORMAL MG/DL
APPEARANCE UR: CLEAR
BILIRUB UR QL STRIP: NEGATIVE
COLOR UR AUTO: YELLOW
EST. AVERAGE GLUCOSE BLD GHB EST-MCNC: 108 MG/DL
GLUCOSE UR STRIP-MCNC: NEGATIVE MG/DL
HBA1C MFR BLD: 5.4 % (ref 0–5.6)
HGB BLD-MCNC: 11.2 G/DL (ref 11.7–15.7)
HGB UR QL STRIP: NEGATIVE
KETONES UR STRIP-MCNC: NEGATIVE MG/DL
LEUKOCYTE ESTERASE UR QL STRIP: NEGATIVE
NITRATE UR QL: NEGATIVE
PH UR STRIP: 7.5 [PH] (ref 5–8)
SP GR UR STRIP: 1.02 (ref 1–1.03)
UROBILINOGEN UR STRIP-ACNC: 0.2 E.U./DL

## 2024-11-11 PROCEDURE — 84439 ASSAY OF FREE THYROXINE: CPT | Performed by: FAMILY MEDICINE

## 2024-11-11 PROCEDURE — 85018 HEMOGLOBIN: CPT | Performed by: FAMILY MEDICINE

## 2024-11-11 PROCEDURE — 99207 PR PRENATAL VISIT: CPT | Performed by: FAMILY MEDICINE

## 2024-11-11 PROCEDURE — 83036 HEMOGLOBIN GLYCOSYLATED A1C: CPT | Performed by: FAMILY MEDICINE

## 2024-11-11 PROCEDURE — 81003 URINALYSIS AUTO W/O SCOPE: CPT | Performed by: FAMILY MEDICINE

## 2024-11-11 PROCEDURE — 36415 COLL VENOUS BLD VENIPUNCTURE: CPT | Performed by: FAMILY MEDICINE

## 2024-11-11 PROCEDURE — 84443 ASSAY THYROID STIM HORMONE: CPT | Performed by: FAMILY MEDICINE

## 2024-11-11 NOTE — PATIENT INSTRUCTIONS
Please finish the antibiotics for the UTI.  Urine does look better today.    Next next visit in 2 weeks, then weekly.  At that visit and all subsequent visits we will be doing the cervical check.    Starting at 36 weeks we will be doing weekly nonstress tests at clinic.    I am ordering a biophysical profile ultrasound to be done in about 3 weeks or 36 weeks pregnant.  You can call [phone] to set that up.  That will also check growth.    A1c, hemoglobin and thyroid today.

## 2024-11-11 NOTE — PROGRESS NOTES
A/P: 29-year-old  3 para 2-0-0-2 female at 33-0/7 weeks gestation.  O+ blood type.  Gestational diabetes, diet-controlled.  Hypothyroid.    Please finish the antibiotics for the UTI.  Urine does look better today.    Next next visit in 2 weeks, then weekly.  At that visit and all subsequent visits we will be doing the cervical check.    Starting at 36 weeks we will be doing weekly nonstress tests at clinic.    I am ordering a biophysical profile ultrasound to be done in about 3 weeks or 36 weeks pregnant.  You can call [phone] to set that up.  That will also check growth.    A1c, hemoglobin and thyroid today.    We will recheck with patient next visit if she is interested in the Tdap, influenza, COVID and RSV vaccines.    S: Patient feels good fetal movement.  Denies regular contractions.  Denies lower extremity edema.  Denies vaginal bleeding.  UTI on Cephalexin 5-6 /7 days.  Blood sugars are good.  No medicaiotn needed yet.  US 44% and vtx.  Maternal ovarian cyst has resolved.    O:  DTR 2+, see OB flowsheet

## 2024-11-12 DIAGNOSIS — E03.9 ACQUIRED HYPOTHYROIDISM: Primary | ICD-10-CM

## 2024-11-12 LAB
T4 FREE SERPL-MCNC: 0.73 NG/DL (ref 0.9–1.7)
TSH SERPL DL<=0.005 MIU/L-ACNC: 5.54 UIU/ML (ref 0.3–4.2)

## 2024-11-12 RX ORDER — LEVOTHYROXINE SODIUM 175 UG/1
175 TABLET ORAL DAILY
Qty: 90 TABLET | Refills: 1 | Status: SHIPPED | OUTPATIENT
Start: 2024-11-12

## 2024-11-25 ENCOUNTER — PRENATAL OFFICE VISIT (OUTPATIENT)
Dept: FAMILY MEDICINE | Facility: CLINIC | Age: 29
End: 2024-11-25
Payer: COMMERCIAL

## 2024-11-25 VITALS
SYSTOLIC BLOOD PRESSURE: 111 MMHG | HEART RATE: 94 BPM | TEMPERATURE: 98 F | BODY MASS INDEX: 32.9 KG/M2 | WEIGHT: 157.4 LBS | OXYGEN SATURATION: 98 % | RESPIRATION RATE: 16 BRPM | DIASTOLIC BLOOD PRESSURE: 67 MMHG

## 2024-11-25 DIAGNOSIS — Z34.82 ENCOUNTER FOR SUPERVISION OF OTHER NORMAL PREGNANCY IN SECOND TRIMESTER: ICD-10-CM

## 2024-11-25 LAB
ALBUMIN UR-MCNC: NEGATIVE MG/DL
APPEARANCE UR: CLEAR
BILIRUB UR QL STRIP: NEGATIVE
COLOR UR AUTO: YELLOW
GLUCOSE UR STRIP-MCNC: 250 MG/DL
HGB UR QL STRIP: NEGATIVE
KETONES UR STRIP-MCNC: NEGATIVE MG/DL
LEUKOCYTE ESTERASE UR QL STRIP: NEGATIVE
NITRATE UR QL: NEGATIVE
PH UR STRIP: 7 [PH] (ref 5–8)
SP GR UR STRIP: 1.02 (ref 1–1.03)
UROBILINOGEN UR STRIP-ACNC: 0.2 E.U./DL

## 2024-11-25 PROCEDURE — 81003 URINALYSIS AUTO W/O SCOPE: CPT | Performed by: FAMILY MEDICINE

## 2024-11-25 PROCEDURE — 99207 PR PRENATAL VISIT: CPT | Performed by: FAMILY MEDICINE

## 2024-11-25 NOTE — PROGRESS NOTES
A/P: 29-year-old  3 para 2-0-0-2 female at 35-0/7 weeks gestation.  O+ blood type.  Gestational diabetes, diet-controlled.  Mild anemia, stable.  Obese.  Vitamin D deficiency.  Hypothyroid.    OB visits weekly.    Next week we will start the weekly nonstress tests.    Repeat A1c, TSH and hemoglobin in 2 weeks.    Set biophysical profile ultrasound in about a week or before your next appointment.    If you are before 37 weeks and you are having 3 or more contractions in an hour or your water breaks with a gush or slow constant trickle you want to give a call to [facility] labor and delivery at [phone].    When you are after 37 weeks we watch for contractions every 10 to 15 minutes or that would be every 4 to   6 in an hour or again if your water breaks with a gush or slow constant trickle call the hospital.    If your blood sugars continue to stay stable then we can plan induction starting the evening of .  Certainly if blood sugars are spiking would want to induce at the 39-week guero which would be .    Declined the Tdap, influenza, COVID and RSV vaccines.     Next visit we will do group B strep.    S: Patient feels good fetal movement.  Denies regular contractions.  Denies lower extremity edema.  Denies vaginal bleeding.  Average blood sugar 80.     O:  DTR 2+, UA-250 glucose, see OB flowsheet

## 2024-11-29 ENCOUNTER — HOSPITAL ENCOUNTER (OUTPATIENT)
Dept: ULTRASOUND IMAGING | Facility: HOSPITAL | Age: 29
Discharge: HOME | End: 2024-11-29
Attending: FAMILY MEDICINE | Admitting: FAMILY MEDICINE
Payer: COMMERCIAL

## 2024-11-29 DIAGNOSIS — O24.410 DIET CONTROLLED GESTATIONAL DIABETES MELLITUS (GDM) IN THIRD TRIMESTER: ICD-10-CM

## 2024-11-29 LAB — RADIOLOGIST FLAGS: ABNORMAL

## 2024-11-29 PROCEDURE — 76819 FETAL BIOPHYS PROFIL W/O NST: CPT

## 2024-11-29 PROCEDURE — 76816 OB US FOLLOW-UP PER FETUS: CPT

## 2024-11-30 DIAGNOSIS — O36.5931 POOR FETAL GROWTH AFFECTING MANAGEMENT OF MOTHER IN THIRD TRIMESTER, FETUS 1 OF MULTIPLE GESTATION: Primary | ICD-10-CM

## 2024-12-02 ENCOUNTER — PRE VISIT (OUTPATIENT)
Dept: MATERNAL FETAL MEDICINE | Facility: HOSPITAL | Age: 29
End: 2024-12-02

## 2024-12-02 ENCOUNTER — OFFICE VISIT (OUTPATIENT)
Dept: MATERNAL FETAL MEDICINE | Facility: HOSPITAL | Age: 29
End: 2024-12-02
Attending: STUDENT IN AN ORGANIZED HEALTH CARE EDUCATION/TRAINING PROGRAM
Payer: COMMERCIAL

## 2024-12-02 ENCOUNTER — PRENATAL OFFICE VISIT (OUTPATIENT)
Dept: FAMILY MEDICINE | Facility: CLINIC | Age: 29
End: 2024-12-02
Payer: COMMERCIAL

## 2024-12-02 ENCOUNTER — TRANSCRIBE ORDERS (OUTPATIENT)
Dept: MATERNAL FETAL MEDICINE | Facility: HOSPITAL | Age: 29
End: 2024-12-02

## 2024-12-02 ENCOUNTER — ANCILLARY PROCEDURE (OUTPATIENT)
Dept: ULTRASOUND IMAGING | Facility: HOSPITAL | Age: 29
End: 2024-12-02
Attending: STUDENT IN AN ORGANIZED HEALTH CARE EDUCATION/TRAINING PROGRAM
Payer: COMMERCIAL

## 2024-12-02 VITALS
WEIGHT: 159.8 LBS | BODY MASS INDEX: 33.4 KG/M2 | HEART RATE: 91 BPM | TEMPERATURE: 98 F | RESPIRATION RATE: 16 BRPM | SYSTOLIC BLOOD PRESSURE: 106 MMHG | DIASTOLIC BLOOD PRESSURE: 61 MMHG | OXYGEN SATURATION: 99 %

## 2024-12-02 VITALS — HEART RATE: 91 BPM | SYSTOLIC BLOOD PRESSURE: 104 MMHG | DIASTOLIC BLOOD PRESSURE: 67 MMHG

## 2024-12-02 DIAGNOSIS — O36.5990 FETAL GROWTH RESTRICTION ANTEPARTUM: Primary | ICD-10-CM

## 2024-12-02 DIAGNOSIS — O24.410 DIET CONTROLLED GESTATIONAL DIABETES MELLITUS (GDM) IN THIRD TRIMESTER: ICD-10-CM

## 2024-12-02 DIAGNOSIS — E55.9 VITAMIN D DEFICIENCY: ICD-10-CM

## 2024-12-02 DIAGNOSIS — O26.90 PREGNANCY RELATED CONDITION: Primary | ICD-10-CM

## 2024-12-02 DIAGNOSIS — E03.9 ACQUIRED HYPOTHYROIDISM: ICD-10-CM

## 2024-12-02 DIAGNOSIS — Z34.82 ENCOUNTER FOR SUPERVISION OF OTHER NORMAL PREGNANCY IN SECOND TRIMESTER: Primary | ICD-10-CM

## 2024-12-02 DIAGNOSIS — O26.90 PREGNANCY RELATED CONDITION: ICD-10-CM

## 2024-12-02 LAB
ALBUMIN UR-MCNC: NEGATIVE MG/DL
APPEARANCE UR: CLEAR
BILIRUB UR QL STRIP: NEGATIVE
COLOR UR AUTO: YELLOW
EST. AVERAGE GLUCOSE BLD GHB EST-MCNC: 111 MG/DL
GLUCOSE UR STRIP-MCNC: 100 MG/DL
HBA1C MFR BLD: 5.5 % (ref 0–5.6)
HGB BLD-MCNC: 11.7 G/DL (ref 11.7–15.7)
HGB UR QL STRIP: NEGATIVE
KETONES UR STRIP-MCNC: NEGATIVE MG/DL
LEUKOCYTE ESTERASE UR QL STRIP: ABNORMAL
NITRATE UR QL: NEGATIVE
PH UR STRIP: 7 [PH] (ref 5–8)
SP GR UR STRIP: 1.01 (ref 1–1.03)
TSH SERPL DL<=0.005 MIU/L-ACNC: 0.5 UIU/ML (ref 0.3–4.2)
UROBILINOGEN UR STRIP-ACNC: 0.2 E.U./DL
VIT D+METAB SERPL-MCNC: 28 NG/ML (ref 20–50)

## 2024-12-02 PROCEDURE — 81003 URINALYSIS AUTO W/O SCOPE: CPT | Performed by: FAMILY MEDICINE

## 2024-12-02 PROCEDURE — 59025 FETAL NON-STRESS TEST: CPT

## 2024-12-02 PROCEDURE — 99207 PR PRENATAL VISIT: CPT | Performed by: FAMILY MEDICINE

## 2024-12-02 PROCEDURE — 36415 COLL VENOUS BLD VENIPUNCTURE: CPT | Performed by: FAMILY MEDICINE

## 2024-12-02 PROCEDURE — 76811 OB US DETAILED SNGL FETUS: CPT | Mod: 26 | Performed by: STUDENT IN AN ORGANIZED HEALTH CARE EDUCATION/TRAINING PROGRAM

## 2024-12-02 PROCEDURE — 59025 FETAL NON-STRESS TEST: CPT | Performed by: FAMILY MEDICINE

## 2024-12-02 PROCEDURE — 76820 UMBILICAL ARTERY ECHO: CPT | Mod: 26 | Performed by: STUDENT IN AN ORGANIZED HEALTH CARE EDUCATION/TRAINING PROGRAM

## 2024-12-02 PROCEDURE — 87653 STREP B DNA AMP PROBE: CPT | Performed by: FAMILY MEDICINE

## 2024-12-02 PROCEDURE — 85018 HEMOGLOBIN: CPT | Performed by: FAMILY MEDICINE

## 2024-12-02 PROCEDURE — 82306 VITAMIN D 25 HYDROXY: CPT | Performed by: FAMILY MEDICINE

## 2024-12-02 PROCEDURE — 84443 ASSAY THYROID STIM HORMONE: CPT | Performed by: FAMILY MEDICINE

## 2024-12-02 PROCEDURE — 76811 OB US DETAILED SNGL FETUS: CPT

## 2024-12-02 PROCEDURE — 83036 HEMOGLOBIN GLYCOSYLATED A1C: CPT | Performed by: FAMILY MEDICINE

## 2024-12-02 PROCEDURE — 76820 UMBILICAL ARTERY ECHO: CPT

## 2024-12-02 PROCEDURE — 59025 FETAL NON-STRESS TEST: CPT | Mod: 26 | Performed by: STUDENT IN AN ORGANIZED HEALTH CARE EDUCATION/TRAINING PROGRAM

## 2024-12-02 PROCEDURE — 99203 OFFICE O/P NEW LOW 30 MIN: CPT | Mod: 25 | Performed by: STUDENT IN AN ORGANIZED HEALTH CARE EDUCATION/TRAINING PROGRAM

## 2024-12-02 NOTE — PROGRESS NOTES
A/P: 29-year-old  3 para 2-0-0-2 female at 36-0/7 weeks gestation.  Hypothyroid.  Gestational diabetes, diet-controlled.  Vitamin D deficiency.  Mild anemia.  IUGR on recent ultrasound.    S: Patient feels good fetal movement.  Occasional contractions, Denies regular contractions.  Denies lower extremity edema.  Denies vaginal bleeding.  Normal blood sugars.    The most recent ultrasound showed that baby was small or possible intrauterine growth retardation. Weight was less than 10th percentile.      I am glad you are going to see maternal-fetal medicine today for a detailed ultrasound.    If they feel we need to induce you around 37 weeks or sooner we are going to send you up to [facility] for steroids a steroid shot 2 days in a row to ensure baby's lungs are mature.    If you are feeling decreased fetal movement please give a call to labor and delivery at [facility] to be monitored.    GBS and labs today.    If you are before 37 weeks and you are having 3 or more contractions in an hour or your water breaks with a gush or slow constant trickle you want to give a call to [facility] labor and delivery at [phone].     When you are after 37 weeks we watch for contractions every 10 to 15 minutes or that would be every 4 to   6 in an hour or again if your water breaks with a gush or slow constant trickle call the hospital.    NST weekly for BMI over 30.    O:  DTR 2+, UA- 100 glu, Tr LE, see OB flowsheet  NST interpretation: reactive. Test duration 20 min. Baseline  bpm. Baseline variability: moderate. Accelerations: present. Decelerations: absent. Uterine activity: absent

## 2024-12-02 NOTE — PROGRESS NOTES
"Please see \"Imaging\" tab under \"Chart Review\" for details of today's visit.    Anushka Aragon    "

## 2024-12-02 NOTE — PATIENT INSTRUCTIONS
The most recent ultrasound showed that baby was small or possible intrauterine growth retardation weight.  Weight was less than 10th percentile.      I am glad you are going to see maternal-fetal medicine today for a detailed ultrasound.    If they feel we need to induce you around 37 weeks or sooner we are going to send you up to [facility] for steroids a steroid shot 2 days in a row to ensure baby's lungs are mature.    If you are feeling decreased fetal movement please give a call to labor and delivery at [facility] to be monitored.    GBS and labs today.    If you are before 37 weeks and you are having 3 or more contractions in an hour or your water breaks with a gush or slow constant trickle you want to give a call to [facility] labor and delivery at [phone].     When you are after 37 weeks we watch for contractions every 10 to 15 minutes or that would be every 4 to   6 in an hour or again if your water breaks with a gush or slow constant trickle call the hospital.

## 2024-12-02 NOTE — NURSING NOTE
Juan J Burgess is a  at 36w0d who presents to [facility] for a comprehensive ultrasound. Pt reports positive fetal movement. Pt denies bldg/lof/change in discharge, contractions, headache, vision changes, chest pain/SOB or edema. SBAR given to Dr. JAVED Aragon, see note in Epic.      NST Performed due to FGR.  Dr. JAVED Aragon reviewed efm tracing. See NST/BPP Doc Flowsheet tab.      Alba Kaur RN

## 2024-12-03 LAB — GP B STREP DNA SPEC QL NAA+PROBE: NEGATIVE

## 2024-12-09 ENCOUNTER — OFFICE VISIT (OUTPATIENT)
Dept: MATERNAL FETAL MEDICINE | Facility: CLINIC | Age: 29
End: 2024-12-09
Attending: STUDENT IN AN ORGANIZED HEALTH CARE EDUCATION/TRAINING PROGRAM
Payer: COMMERCIAL

## 2024-12-09 ENCOUNTER — HOSPITAL ENCOUNTER (OUTPATIENT)
Dept: ULTRASOUND IMAGING | Facility: CLINIC | Age: 29
Discharge: HOME | End: 2024-12-09
Attending: STUDENT IN AN ORGANIZED HEALTH CARE EDUCATION/TRAINING PROGRAM
Payer: COMMERCIAL

## 2024-12-09 ENCOUNTER — PRENATAL OFFICE VISIT (OUTPATIENT)
Dept: FAMILY MEDICINE | Facility: CLINIC | Age: 29
End: 2024-12-09
Payer: COMMERCIAL

## 2024-12-09 VITALS — DIASTOLIC BLOOD PRESSURE: 68 MMHG | SYSTOLIC BLOOD PRESSURE: 101 MMHG

## 2024-12-09 VITALS
RESPIRATION RATE: 16 BRPM | SYSTOLIC BLOOD PRESSURE: 105 MMHG | WEIGHT: 158.8 LBS | HEART RATE: 93 BPM | OXYGEN SATURATION: 99 % | DIASTOLIC BLOOD PRESSURE: 71 MMHG | BODY MASS INDEX: 33.19 KG/M2

## 2024-12-09 DIAGNOSIS — O24.410 DIET CONTROLLED GESTATIONAL DIABETES MELLITUS (GDM) IN THIRD TRIMESTER: ICD-10-CM

## 2024-12-09 DIAGNOSIS — Z34.82 ENCOUNTER FOR SUPERVISION OF OTHER NORMAL PREGNANCY IN SECOND TRIMESTER: ICD-10-CM

## 2024-12-09 DIAGNOSIS — O36.5990 FETAL GROWTH RESTRICTION ANTEPARTUM: ICD-10-CM

## 2024-12-09 DIAGNOSIS — O36.5990 FETAL GROWTH RESTRICTION ANTEPARTUM: Primary | ICD-10-CM

## 2024-12-09 LAB
ALBUMIN UR-MCNC: NEGATIVE MG/DL
APPEARANCE UR: CLEAR
BILIRUB UR QL STRIP: NEGATIVE
COLOR UR AUTO: YELLOW
GLUCOSE UR STRIP-MCNC: 250 MG/DL
HGB UR QL STRIP: NEGATIVE
KETONES UR STRIP-MCNC: NEGATIVE MG/DL
LEUKOCYTE ESTERASE UR QL STRIP: NEGATIVE
NITRATE UR QL: NEGATIVE
PH UR STRIP: 6.5 [PH] (ref 5–8)
SP GR UR STRIP: 1.02 (ref 1–1.03)
UROBILINOGEN UR STRIP-ACNC: 0.2 E.U./DL

## 2024-12-09 PROCEDURE — 81003 URINALYSIS AUTO W/O SCOPE: CPT | Performed by: FAMILY MEDICINE

## 2024-12-09 PROCEDURE — 76820 UMBILICAL ARTERY ECHO: CPT

## 2024-12-09 PROCEDURE — 99207 PR PRENATAL VISIT: CPT | Performed by: FAMILY MEDICINE

## 2024-12-09 PROCEDURE — 59025 FETAL NON-STRESS TEST: CPT | Mod: 26 | Performed by: STUDENT IN AN ORGANIZED HEALTH CARE EDUCATION/TRAINING PROGRAM

## 2024-12-09 PROCEDURE — 76820 UMBILICAL ARTERY ECHO: CPT | Mod: 26 | Performed by: STUDENT IN AN ORGANIZED HEALTH CARE EDUCATION/TRAINING PROGRAM

## 2024-12-09 PROCEDURE — 76815 OB US LIMITED FETUS(S): CPT | Mod: 26 | Performed by: STUDENT IN AN ORGANIZED HEALTH CARE EDUCATION/TRAINING PROGRAM

## 2024-12-09 PROCEDURE — 59025 FETAL NON-STRESS TEST: CPT

## 2024-12-09 NOTE — NURSING NOTE
Patient reports positive fetal movement, denies contractions, leaking of fluid, or bleeding.  Reports blood sugar values fasting and hr post prandial wnl.  Patient has cervical ripening scheduled on 12/17 with IOL on 12/18.  Education provided to patient on today's ultrasound and NST.  SBAR given to NÉSTOR RUIZ, see their note in Epic.

## 2024-12-09 NOTE — PATIENT INSTRUCTIONS
If juan every 15 minutes or more or your water breaks with a gush or slow constant trickle please give a call to [facility] labor and delivery at [phone].    With the fact you had a retained placenta and needed a D&C 2 weeks after your last delivery we will watch for any signs of retained placenta.    It is expected to have heavy bleeding for 2 days after delivery, moderate bleeding first up to 7 days after delivery and light bleeding for up to 6 weeks.  Every day bleeding should get better.  If you ever notice bleeding getting worse not better we need to know about that and get an ultrasound to look for retained placenta.  A small clot here and there is normal blood clot after clot is not normal and the we also want to look for retained placenta.     With the fetal growth restriction I am glad you are seeing maternal-fetal medicine for an ultrasound today.  Please ask them if they feel we need to induce any earlier than our planned date of December 17.    When you see maternal-fetal medicine ask if they want another nonstress test in 3 days.  If they do we can help set that up at [facility] or if they wanted to do it at their clinic that is fine as well.    Next office visit 1 week.    Next visit we will do a hemoglobin and thyroid.    Nonstress test reactive.

## 2024-12-09 NOTE — PROGRESS NOTES
A/P: 29-year-old  3 para 2-0-0-2 female at 37-0/7 weeks gestation.  O+ blood type.  Fetal growth restriction.  Gestational diabetes diet-controlled, hypothyroidism.  Negative group B strep.    If juan every 15 minutes or more or your water breaks with a gush or slow constant trickle please give a call to [facility] labor and delivery at [phone].    With the fact you had a retained placenta and needed a D&C 2 weeks after your last delivery we will watch for any signs of retained placenta.    It is expected to have heavy bleeding for 2 days after delivery, moderate bleeding first up to 7 days after delivery and light bleeding for up to 6 weeks.  Every day bleeding should get better.  If you ever notice bleeding getting worse not better we need to know about that and get an ultrasound to look for retained placenta.  A small clot here and there is normal blood clot after clot is not normal and the we also want to look for retained placenta.     With the fetal growth restriction I am glad you are seeing maternal-fetal medicine for an ultrasound today.  Please ask them if they feel we need to induce any earlier than our planned date of .    When you see maternal-fetal medicine ask if they want another nonstress test in 3 days.  If they do we can help set that up at [facility] or if they wanted to do it at their clinic that is fine as well.    Next office visit 1 week.    Next visit we will do a hemoglobin and thyroid.    Nonstress test reactive.    S: Patient feels good fetal movement.  Denies regular contractions.  Denies lower extremity edema.  Denies vaginal bleeding.  Blood sugars are good.    History:  Pain in abdomen 2 weeks after her last delivery and needed D and C 10-14-22, 2 weeks after delivery, For retained placenta.    O:  DTR 2+,  Glu, see OB flowsheet  NST interpretation: reactive. Test duration 12 min. Baseline  bpm. Baseline variability: moderate. Accelerations:  present. Decelerations: absent. Uterine activity: absent

## 2024-12-09 NOTE — PROGRESS NOTES
The patient was seen for an ultrasound in the [facility] today.  For a detailed report of the ultrasound examination, please see the ultrasound report which can be found under the imaging tab.    If you have questions regarding today's evaluation or if we can be of further service, please contact the [facility] Center.    Margo Harman MD  , OB/GYN  Maternal-Fetal Medicine

## 2024-12-16 ENCOUNTER — PRENATAL OFFICE VISIT (OUTPATIENT)
Dept: FAMILY MEDICINE | Facility: CLINIC | Age: 29
End: 2024-12-16
Payer: COMMERCIAL

## 2024-12-16 VITALS
OXYGEN SATURATION: 98 % | WEIGHT: 159 LBS | SYSTOLIC BLOOD PRESSURE: 113 MMHG | BODY MASS INDEX: 33.23 KG/M2 | HEART RATE: 90 BPM | RESPIRATION RATE: 16 BRPM | DIASTOLIC BLOOD PRESSURE: 65 MMHG

## 2024-12-16 DIAGNOSIS — O09.93 SUPERVISION OF HIGH RISK PREGNANCY IN THIRD TRIMESTER: ICD-10-CM

## 2024-12-16 DIAGNOSIS — Z34.82 ENCOUNTER FOR SUPERVISION OF OTHER NORMAL PREGNANCY IN SECOND TRIMESTER: ICD-10-CM

## 2024-12-16 DIAGNOSIS — E03.9 ACQUIRED HYPOTHYROIDISM: Primary | ICD-10-CM

## 2024-12-16 DIAGNOSIS — O36.5930 POOR FETAL GROWTH AFFECTING MANAGEMENT OF MOTHER IN THIRD TRIMESTER, SINGLE OR UNSPECIFIED FETUS: ICD-10-CM

## 2024-12-16 DIAGNOSIS — O24.410 DIET CONTROLLED GESTATIONAL DIABETES MELLITUS (GDM) IN THIRD TRIMESTER: ICD-10-CM

## 2024-12-16 DIAGNOSIS — E55.9 VITAMIN D DEFICIENCY: ICD-10-CM

## 2024-12-16 LAB
ALBUMIN UR-MCNC: NEGATIVE MG/DL
APPEARANCE UR: CLEAR
BILIRUB UR QL STRIP: NEGATIVE
COLOR UR AUTO: YELLOW
EST. AVERAGE GLUCOSE BLD GHB EST-MCNC: 114 MG/DL
GLUCOSE UR STRIP-MCNC: NEGATIVE MG/DL
HBA1C MFR BLD: 5.6 % (ref 0–5.6)
HGB BLD-MCNC: 11.7 G/DL (ref 11.7–15.7)
HGB UR QL STRIP: NEGATIVE
KETONES UR STRIP-MCNC: NEGATIVE MG/DL
LEUKOCYTE ESTERASE UR QL STRIP: ABNORMAL
NITRATE UR QL: NEGATIVE
PH UR STRIP: 6.5 [PH] (ref 5–8)
SP GR UR STRIP: 1.02 (ref 1–1.03)
UROBILINOGEN UR STRIP-ACNC: 0.2 E.U./DL

## 2024-12-16 PROCEDURE — 84443 ASSAY THYROID STIM HORMONE: CPT | Performed by: FAMILY MEDICINE

## 2024-12-16 PROCEDURE — 85018 HEMOGLOBIN: CPT | Performed by: FAMILY MEDICINE

## 2024-12-16 PROCEDURE — 99207 PR PRENATAL VISIT: CPT | Performed by: FAMILY MEDICINE

## 2024-12-16 PROCEDURE — 36415 COLL VENOUS BLD VENIPUNCTURE: CPT | Performed by: FAMILY MEDICINE

## 2024-12-16 PROCEDURE — 83036 HEMOGLOBIN GLYCOSYLATED A1C: CPT | Performed by: FAMILY MEDICINE

## 2024-12-16 PROCEDURE — 81003 URINALYSIS AUTO W/O SCOPE: CPT | Performed by: FAMILY MEDICINE

## 2024-12-16 PROCEDURE — 82306 VITAMIN D 25 HYDROXY: CPT | Performed by: FAMILY MEDICINE

## 2024-12-16 PROCEDURE — 59025 FETAL NON-STRESS TEST: CPT | Performed by: FAMILY MEDICINE

## 2024-12-16 ASSESSMENT — ASTHMA QUESTIONNAIRES
QUESTION_4 LAST FOUR WEEKS HOW OFTEN HAVE YOU USED YOUR RESCUE INHALER OR NEBULIZER MEDICATION (SUCH AS ALBUTEROL): NOT AT ALL
QUESTION_5 LAST FOUR WEEKS HOW WOULD YOU RATE YOUR ASTHMA CONTROL: COMPLETELY CONTROLLED
QUESTION_3 LAST FOUR WEEKS HOW OFTEN DID YOUR ASTHMA SYMPTOMS (WHEEZING, COUGHING, SHORTNESS OF BREATH, CHEST TIGHTNESS OR PAIN) WAKE YOU UP AT NIGHT OR EARLIER THAN USUAL IN THE MORNING: NOT AT ALL
QUESTION_2 LAST FOUR WEEKS HOW OFTEN HAVE YOU HAD SHORTNESS OF BREATH: NOT AT ALL
QUESTION_1 LAST FOUR WEEKS HOW MUCH OF THE TIME DID YOUR ASTHMA KEEP YOU FROM GETTING AS MUCH DONE AT WORK, SCHOOL OR AT HOME: NONE OF THE TIME
ACT_TOTALSCORE: 25
ACT_TOTALSCORE: 25

## 2024-12-16 NOTE — PATIENT INSTRUCTIONS
Induction set up at [facility] tomorrow Tuesday, December 17 at 9 AM.  Please call the hospital at 8 AM to ensure they have a bed and a nurse.  Phone #[phone].    Non stress test done today and reactive.    If juan every 15 minutes or more or your water breaks with a gush or slow constant trickle please give a call to [facility] labor and delivery at [phone].     With the fact you had a retained placenta and needed a D&C 2 weeks after your last delivery we will watch for any signs of retained placenta.    Today we will check a hemoglobin, thyroid and vitamin D and A1C.    Ordered a breast pump and we will give you one today.

## 2024-12-16 NOTE — PROGRESS NOTES
A/P: 29-year-old  3 para 2-0-0-2 female at 38-0/7 weeks gestation.  O+ blood type.  IUGR.  Gestational diabetes, diet-controlled.  Hypothyroid.  Obese.  Negative group B strep.    Induction set up at [facility] tomorrow  at 9 AM.  Please call the hospital at 8 AM to ensure they have a bed and a nurse.  Phone #[phone].    Non stress test done today and reactive.    If juan every 15 minutes or more or your water breaks with a gush or slow constant trickle please give a call to [facility] labor and delivery at [phone].     With the fact you had a retained placenta and needed a D&C 2 weeks after your last delivery we will watch for any signs of retained placenta.    Today we will check a hemoglobin, thyroid and vitamin D and A1C.    Ordered a breast pump and we will give you one today.    S: Patient feels good fetal movement.  Denies regular contractions.  Denies lower extremity edema.  Denies vaginal bleeding.  Blood sugars are good.    O:  DTR 2+, UA-Tr LE, see OB flowsheet    NST interpretation: reactive. Test duration 20 min. Baseline  bpm. Baseline variability: moderate. Accelerations: present. Decelerations: absent. Uterine activity: 1 contraction in 20 min.

## 2024-12-17 ENCOUNTER — HOSPITAL ENCOUNTER (INPATIENT)
Facility: CLINIC | Age: 29
End: 2024-12-17
Attending: FAMILY MEDICINE | Admitting: FAMILY MEDICINE
Payer: COMMERCIAL

## 2024-12-17 ENCOUNTER — ANESTHESIA EVENT (OUTPATIENT)
Dept: OBGYN | Facility: CLINIC | Age: 29
End: 2024-12-17
Payer: COMMERCIAL

## 2024-12-17 ENCOUNTER — ANESTHESIA (OUTPATIENT)
Dept: OBGYN | Facility: CLINIC | Age: 29
End: 2024-12-17
Payer: COMMERCIAL

## 2024-12-17 PROBLEM — O36.5990 PREGNANCY AFFECTED BY FETAL GROWTH RESTRICTION: Status: ACTIVE | Noted: 2024-12-17

## 2024-12-17 LAB
ABO + RH BLD: NORMAL
ALBUMIN UR-MCNC: NEGATIVE MG/DL
ANION GAP SERPL CALCULATED.3IONS-SCNC: 13 MMOL/L (ref 7–15)
APPEARANCE UR: CLEAR
BILIRUB UR QL STRIP: NEGATIVE
BLD GP AB SCN SERPL QL: NEGATIVE
BUN SERPL-MCNC: 12.4 MG/DL (ref 6–20)
CALCIUM SERPL-MCNC: 9.5 MG/DL (ref 8.8–10.4)
CHLORIDE SERPL-SCNC: 103 MMOL/L (ref 98–107)
COLOR UR AUTO: ABNORMAL
CREAT SERPL-MCNC: 0.79 MG/DL (ref 0.51–0.95)
EGFRCR SERPLBLD CKD-EPI 2021: >90 ML/MIN/1.73M2
GLUCOSE BLDC GLUCOMTR-MCNC: 107 MG/DL (ref 70–99)
GLUCOSE BLDC GLUCOMTR-MCNC: 127 MG/DL (ref 70–99)
GLUCOSE BLDC GLUCOMTR-MCNC: 92 MG/DL (ref 70–99)
GLUCOSE SERPL-MCNC: 101 MG/DL (ref 70–99)
GLUCOSE UR STRIP-MCNC: NEGATIVE MG/DL
HCO3 SERPL-SCNC: 20 MMOL/L (ref 22–29)
HGB BLD-MCNC: 11.7 G/DL (ref 11.7–15.7)
HGB UR QL STRIP: ABNORMAL
KETONES UR STRIP-MCNC: NEGATIVE MG/DL
LEUKOCYTE ESTERASE UR QL STRIP: NEGATIVE
MUCOUS THREADS #/AREA URNS LPF: PRESENT /LPF
NITRATE UR QL: NEGATIVE
PH UR STRIP: 6.5 [PH] (ref 5–7)
POTASSIUM SERPL-SCNC: 3.9 MMOL/L (ref 3.4–5.3)
RBC URINE: 2 /HPF
SODIUM SERPL-SCNC: 136 MMOL/L (ref 135–145)
SP GR UR STRIP: 1.02 (ref 1–1.03)
SPECIMEN EXP DATE BLD: NORMAL
SQUAMOUS EPITHELIAL: 3 /HPF
T PALLIDUM AB SER QL: NONREACTIVE
TSH SERPL DL<=0.005 MIU/L-ACNC: 0.3 UIU/ML (ref 0.3–4.2)
UROBILINOGEN UR STRIP-MCNC: <2 MG/DL
VIT D+METAB SERPL-MCNC: 33 NG/ML (ref 20–50)
WBC URINE: 1 /HPF

## 2024-12-17 PROCEDURE — 250N000011 HC RX IP 250 OP 636: Performed by: ANESTHESIOLOGY

## 2024-12-17 PROCEDURE — 258N000003 HC RX IP 258 OP 636: Performed by: FAMILY MEDICINE

## 2024-12-17 PROCEDURE — 86900 BLOOD TYPING SEROLOGIC ABO: CPT | Performed by: FAMILY MEDICINE

## 2024-12-17 PROCEDURE — 36415 COLL VENOUS BLD VENIPUNCTURE: CPT | Performed by: FAMILY MEDICINE

## 2024-12-17 PROCEDURE — 10907ZC DRAINAGE OF AMNIOTIC FLUID, THERAPEUTIC FROM PRODUCTS OF CONCEPTION, VIA NATURAL OR ARTIFICIAL OPENING: ICD-10-PCS | Performed by: FAMILY MEDICINE

## 2024-12-17 PROCEDURE — 81003 URINALYSIS AUTO W/O SCOPE: CPT | Performed by: FAMILY MEDICINE

## 2024-12-17 PROCEDURE — 86780 TREPONEMA PALLIDUM: CPT | Performed by: FAMILY MEDICINE

## 2024-12-17 PROCEDURE — 120N000001 HC R&B MED SURG/OB

## 2024-12-17 PROCEDURE — 250N000009 HC RX 250: Performed by: FAMILY MEDICINE

## 2024-12-17 PROCEDURE — 00HU33Z INSERTION OF INFUSION DEVICE INTO SPINAL CANAL, PERCUTANEOUS APPROACH: ICD-10-PCS | Performed by: ANESTHESIOLOGY

## 2024-12-17 PROCEDURE — 250N000013 HC RX MED GY IP 250 OP 250 PS 637: Performed by: FAMILY MEDICINE

## 2024-12-17 PROCEDURE — 3E0R3BZ INTRODUCTION OF ANESTHETIC AGENT INTO SPINAL CANAL, PERCUTANEOUS APPROACH: ICD-10-PCS | Performed by: ANESTHESIOLOGY

## 2024-12-17 PROCEDURE — 3E0P7VZ INTRODUCTION OF HORMONE INTO FEMALE REPRODUCTIVE, VIA NATURAL OR ARTIFICIAL OPENING: ICD-10-PCS | Performed by: FAMILY MEDICINE

## 2024-12-17 PROCEDURE — 370N000003 HC ANESTHESIA WARD SERVICE: Performed by: ANESTHESIOLOGY

## 2024-12-17 PROCEDURE — 85018 HEMOGLOBIN: CPT | Performed by: FAMILY MEDICINE

## 2024-12-17 PROCEDURE — 80048 BASIC METABOLIC PNL TOTAL CA: CPT | Performed by: FAMILY MEDICINE

## 2024-12-17 RX ORDER — LOPERAMIDE HYDROCHLORIDE 2 MG/1
4 CAPSULE ORAL
Status: DISCONTINUED | OUTPATIENT
Start: 2024-12-17 | End: 2024-12-18 | Stop reason: HOSPADM

## 2024-12-17 RX ORDER — OXYTOCIN/0.9 % SODIUM CHLORIDE 30/500 ML
340 PLASTIC BAG, INJECTION (ML) INTRAVENOUS CONTINUOUS PRN
Status: DISCONTINUED | OUTPATIENT
Start: 2024-12-17 | End: 2024-12-18 | Stop reason: HOSPADM

## 2024-12-17 RX ORDER — NALBUPHINE HYDROCHLORIDE 10 MG/ML
2.5-5 INJECTION INTRAMUSCULAR; INTRAVENOUS; SUBCUTANEOUS EVERY 6 HOURS PRN
Status: DISCONTINUED | OUTPATIENT
Start: 2024-12-17 | End: 2024-12-20 | Stop reason: HOSPADM

## 2024-12-17 RX ORDER — PROCHLORPERAZINE MALEATE 10 MG
10 TABLET ORAL EVERY 6 HOURS PRN
Status: DISCONTINUED | OUTPATIENT
Start: 2024-12-17 | End: 2024-12-18 | Stop reason: HOSPADM

## 2024-12-17 RX ORDER — OXYTOCIN/0.9 % SODIUM CHLORIDE 30/500 ML
1-24 PLASTIC BAG, INJECTION (ML) INTRAVENOUS CONTINUOUS
Status: DISCONTINUED | OUTPATIENT
Start: 2024-12-17 | End: 2024-12-18 | Stop reason: HOSPADM

## 2024-12-17 RX ORDER — LOPERAMIDE HYDROCHLORIDE 2 MG/1
2 CAPSULE ORAL
Status: DISCONTINUED | OUTPATIENT
Start: 2024-12-17 | End: 2024-12-18 | Stop reason: HOSPADM

## 2024-12-17 RX ORDER — KETOROLAC TROMETHAMINE 30 MG/ML
30 INJECTION, SOLUTION INTRAMUSCULAR; INTRAVENOUS
Status: COMPLETED | OUTPATIENT
Start: 2024-12-17 | End: 2024-12-18

## 2024-12-17 RX ORDER — CARBOPROST TROMETHAMINE 250 UG/ML
250 INJECTION, SOLUTION INTRAMUSCULAR
Status: DISCONTINUED | OUTPATIENT
Start: 2024-12-17 | End: 2024-12-18 | Stop reason: HOSPADM

## 2024-12-17 RX ORDER — DEXTROSE MONOHYDRATE 25 G/50ML
25-50 INJECTION, SOLUTION INTRAVENOUS
Status: DISCONTINUED | OUTPATIENT
Start: 2024-12-17 | End: 2024-12-18 | Stop reason: HOSPADM

## 2024-12-17 RX ORDER — CITRIC ACID/SODIUM CITRATE 334-500MG
30 SOLUTION, ORAL ORAL
Status: DISCONTINUED | OUTPATIENT
Start: 2024-12-17 | End: 2024-12-18 | Stop reason: HOSPADM

## 2024-12-17 RX ORDER — MISOPROSTOL 100 UG/1
25 TABLET ORAL
Status: DISCONTINUED | OUTPATIENT
Start: 2024-12-17 | End: 2024-12-18 | Stop reason: HOSPADM

## 2024-12-17 RX ORDER — ONDANSETRON 4 MG/1
4 TABLET, ORALLY DISINTEGRATING ORAL EVERY 6 HOURS PRN
Status: DISCONTINUED | OUTPATIENT
Start: 2024-12-17 | End: 2024-12-18 | Stop reason: HOSPADM

## 2024-12-17 RX ORDER — NALOXONE HYDROCHLORIDE 0.4 MG/ML
0.2 INJECTION, SOLUTION INTRAMUSCULAR; INTRAVENOUS; SUBCUTANEOUS
Status: DISCONTINUED | OUTPATIENT
Start: 2024-12-17 | End: 2024-12-18 | Stop reason: HOSPADM

## 2024-12-17 RX ORDER — NALOXONE HYDROCHLORIDE 0.4 MG/ML
0.4 INJECTION, SOLUTION INTRAMUSCULAR; INTRAVENOUS; SUBCUTANEOUS
Status: DISCONTINUED | OUTPATIENT
Start: 2024-12-17 | End: 2024-12-18 | Stop reason: HOSPADM

## 2024-12-17 RX ORDER — OXYTOCIN 10 [USP'U]/ML
10 INJECTION, SOLUTION INTRAMUSCULAR; INTRAVENOUS
Status: DISCONTINUED | OUTPATIENT
Start: 2024-12-17 | End: 2024-12-18 | Stop reason: HOSPADM

## 2024-12-17 RX ORDER — SODIUM CHLORIDE, SODIUM LACTATE, POTASSIUM CHLORIDE, CALCIUM CHLORIDE 600; 310; 30; 20 MG/100ML; MG/100ML; MG/100ML; MG/100ML
INJECTION, SOLUTION INTRAVENOUS CONTINUOUS PRN
Status: DISCONTINUED | OUTPATIENT
Start: 2024-12-17 | End: 2024-12-18 | Stop reason: HOSPADM

## 2024-12-17 RX ORDER — NICOTINE POLACRILEX 4 MG
15-30 LOZENGE BUCCAL
Status: DISCONTINUED | OUTPATIENT
Start: 2024-12-17 | End: 2024-12-18 | Stop reason: HOSPADM

## 2024-12-17 RX ORDER — IBUPROFEN 800 MG/1
800 TABLET, FILM COATED ORAL
Status: COMPLETED | OUTPATIENT
Start: 2024-12-17 | End: 2024-12-18

## 2024-12-17 RX ORDER — NICOTINE POLACRILEX 4 MG
15-30 LOZENGE BUCCAL
Status: DISCONTINUED | OUTPATIENT
Start: 2024-12-17 | End: 2024-12-20 | Stop reason: HOSPADM

## 2024-12-17 RX ORDER — LIDOCAINE 40 MG/G
CREAM TOPICAL
Status: DISCONTINUED | OUTPATIENT
Start: 2024-12-17 | End: 2024-12-18 | Stop reason: HOSPADM

## 2024-12-17 RX ORDER — FENTANYL/ROPIVACAINE/NS/PF 2MCG/ML-.1
PLASTIC BAG, INJECTION (ML) EPIDURAL
Status: DISCONTINUED | OUTPATIENT
Start: 2024-12-17 | End: 2024-12-18 | Stop reason: HOSPADM

## 2024-12-17 RX ORDER — HYDROXYZINE HYDROCHLORIDE 25 MG/1
50 TABLET, FILM COATED ORAL
Status: DISCONTINUED | OUTPATIENT
Start: 2024-12-17 | End: 2024-12-18 | Stop reason: HOSPADM

## 2024-12-17 RX ORDER — MISOPROSTOL 200 UG/1
400 TABLET ORAL
Status: DISCONTINUED | OUTPATIENT
Start: 2024-12-17 | End: 2024-12-18 | Stop reason: HOSPADM

## 2024-12-17 RX ORDER — BUPIVACAINE HYDROCHLORIDE 2.5 MG/ML
INJECTION, SOLUTION EPIDURAL; INFILTRATION; INTRACAUDAL
Status: COMPLETED | OUTPATIENT
Start: 2024-12-17 | End: 2024-12-17

## 2024-12-17 RX ORDER — MORPHINE SULFATE 10 MG/ML
10 INJECTION, SOLUTION INTRAMUSCULAR; INTRAVENOUS
Status: DISCONTINUED | OUTPATIENT
Start: 2024-12-17 | End: 2024-12-18 | Stop reason: HOSPADM

## 2024-12-17 RX ORDER — MISOPROSTOL 200 UG/1
800 TABLET ORAL
Status: DISCONTINUED | OUTPATIENT
Start: 2024-12-17 | End: 2024-12-18 | Stop reason: HOSPADM

## 2024-12-17 RX ORDER — ONDANSETRON 2 MG/ML
4 INJECTION INTRAMUSCULAR; INTRAVENOUS EVERY 6 HOURS PRN
Status: DISCONTINUED | OUTPATIENT
Start: 2024-12-17 | End: 2024-12-18 | Stop reason: HOSPADM

## 2024-12-17 RX ORDER — METOCLOPRAMIDE 10 MG/1
10 TABLET ORAL EVERY 6 HOURS PRN
Status: DISCONTINUED | OUTPATIENT
Start: 2024-12-17 | End: 2024-12-18 | Stop reason: HOSPADM

## 2024-12-17 RX ORDER — TRANEXAMIC ACID 10 MG/ML
1 INJECTION, SOLUTION INTRAVENOUS EVERY 30 MIN PRN
Status: DISCONTINUED | OUTPATIENT
Start: 2024-12-17 | End: 2024-12-18 | Stop reason: HOSPADM

## 2024-12-17 RX ORDER — OXYTOCIN/0.9 % SODIUM CHLORIDE 30/500 ML
100-340 PLASTIC BAG, INJECTION (ML) INTRAVENOUS CONTINUOUS PRN
Status: DISCONTINUED | OUTPATIENT
Start: 2024-12-17 | End: 2024-12-20 | Stop reason: HOSPADM

## 2024-12-17 RX ORDER — OXYTOCIN 10 [USP'U]/ML
10 INJECTION, SOLUTION INTRAMUSCULAR; INTRAVENOUS
Status: DISCONTINUED | OUTPATIENT
Start: 2024-12-17 | End: 2024-12-20 | Stop reason: HOSPADM

## 2024-12-17 RX ORDER — DEXTROSE MONOHYDRATE 25 G/50ML
25-50 INJECTION, SOLUTION INTRAVENOUS
Status: DISCONTINUED | OUTPATIENT
Start: 2024-12-17 | End: 2024-12-20 | Stop reason: HOSPADM

## 2024-12-17 RX ORDER — METOCLOPRAMIDE HYDROCHLORIDE 5 MG/ML
10 INJECTION INTRAMUSCULAR; INTRAVENOUS EVERY 6 HOURS PRN
Status: DISCONTINUED | OUTPATIENT
Start: 2024-12-17 | End: 2024-12-18 | Stop reason: HOSPADM

## 2024-12-17 RX ORDER — EPHEDRINE SULFATE 50 MG/ML
5 INJECTION, SOLUTION INTRAMUSCULAR; INTRAVENOUS; SUBCUTANEOUS
Status: DISCONTINUED | OUTPATIENT
Start: 2024-12-17 | End: 2024-12-18 | Stop reason: HOSPADM

## 2024-12-17 RX ORDER — METHYLERGONOVINE MALEATE 0.2 MG/ML
200 INJECTION INTRAVENOUS
Status: DISCONTINUED | OUTPATIENT
Start: 2024-12-17 | End: 2024-12-18 | Stop reason: HOSPADM

## 2024-12-17 RX ORDER — DEXTROSE MONOHYDRATE 100 MG/ML
INJECTION, SOLUTION INTRAVENOUS CONTINUOUS PRN
Status: DISCONTINUED | OUTPATIENT
Start: 2024-12-17 | End: 2024-12-20

## 2024-12-17 RX ORDER — TERBUTALINE SULFATE 1 MG/ML
0.25 INJECTION, SOLUTION SUBCUTANEOUS
Status: DISCONTINUED | OUTPATIENT
Start: 2024-12-17 | End: 2024-12-18 | Stop reason: HOSPADM

## 2024-12-17 RX ADMIN — MISOPROSTOL 25 MCG: 100 TABLET ORAL at 10:04

## 2024-12-17 RX ADMIN — Medication 2 MILLI-UNITS/MIN: at 21:31

## 2024-12-17 RX ADMIN — MISOPROSTOL 25 MCG: 100 TABLET ORAL at 15:56

## 2024-12-17 RX ADMIN — MISOPROSTOL 25 MCG: 100 TABLET ORAL at 12:01

## 2024-12-17 RX ADMIN — BUPIVACAINE HYDROCHLORIDE 5 ML: 2.5 INJECTION, SOLUTION EPIDURAL; INFILTRATION; INTRACAUDAL at 23:30

## 2024-12-17 RX ADMIN — MISOPROSTOL 25 MCG: 100 TABLET ORAL at 13:59

## 2024-12-17 RX ADMIN — Medication: at 23:29

## 2024-12-17 RX ADMIN — SODIUM CHLORIDE, POTASSIUM CHLORIDE, SODIUM LACTATE AND CALCIUM CHLORIDE: 600; 310; 30; 20 INJECTION, SOLUTION INTRAVENOUS at 21:16

## 2024-12-17 RX ADMIN — MISOPROSTOL 25 MCG: 100 TABLET ORAL at 18:04

## 2024-12-17 ASSESSMENT — ACTIVITIES OF DAILY LIVING (ADL)
ADLS_ACUITY_SCORE: 22
ADLS_ACUITY_SCORE: 48
ADLS_ACUITY_SCORE: 22

## 2024-12-17 NOTE — H&P
"[facility] Labor and Delivery History and Physical    Pedro Luis Burgess MRN# [id number]   Age: 29 year old YOB: 1995     Date of Admission:  2024    Primary care provider: Margo Courtney           Chief Complaint:   Pedro Luis Burgess is a 29 year old female who is 38w1d pregnant and being admitted for induction of labor, indication fetal growth restriction.    Also, GDM diet controlled, hypothyroid, obese, and declined vaccines.          Pregnancy history:     OBSTETRIC HISTORY:    OB History    Para Term  AB Living   3 2 2 0 0 2   SAB IAB Ectopic Multiple Live Births   0 0 0 0 2      # Outcome Date GA Lbr Thomas/2nd Weight Sex Type Anes PTL Lv   3 Current            2 Term 10/01/22 41w1d / 00:40 3.19 kg (7 lb 0.5 oz) F Vag-Spont EPI N NASIR      Name: SIMONAFEMALE-PEDRO LUIS      Apgar1: 8  Apgar5: 9   1 Term 17 39w5d / 01:33 2.722 kg (6 lb) M Vag-Spont EPI, Nitrous N NASIR      Name: SIMONAMALE-PEDRO LUIS      Apgar1: 5  Apgar5: 9       EDC: Estimated Date of Delivery: 24    Prenatal Labs:   Lab Results   Component Value Date    AS Negative 2024    HEPBANG Nonreactive 2024    CHPCRT Negative 2024    GCPCRT Negative 2024    HGB 11.7 2024       GBS Status:   No results found for: \"GBS\"    Active Problem List  Patient Active Problem List   Diagnosis    Acquired hypothyroidism    Hashimoto's disease    History of gestational diabetes mellitus    Irregular periods/menstrual cycles    Polycystic ovaries    Subchorionic hemorrhage    Vitamin D deficiency    Encounter for supervision of other normal pregnancy in third trimester    Pregnancy    Vaginitis and vulvovaginitis    Seasonal allergic rhinitis, unspecified trigger    Asthma    Diet controlled gestational diabetes mellitus (GDM) in third trimester    Pregnancy affected by fetal growth restriction       Medication Prior to Admission  Medications Prior to " "Admission   Medication Sig Dispense Refill Last Dose/Taking    alcohol swab prep pads Use to swab area of injection/thea as directed. 100 each 3     blood glucose (NO BRAND SPECIFIED) test strip Use to test blood sugar 4 times daily or as directed. To accompany: Blood Glucose Monitor Brands: per insurance. 100 strip 6 12/17/2024    Blood Glucose Monitoring Suppl (ACCU-CHEK GUIDE) w/Device KIT USE TO TEST BLOOD SUGAR 4 TIMES DAILY OR AS DIRECTED.   12/17/2024    levothyroxine (SYNTHROID/LEVOTHROID) 175 MCG tablet Take 1 tablet (175 mcg) by mouth daily. 90 tablet 1 12/17/2024    prenatal vitamin iron-folic acid 27mg-0.8mg (PRENATAL S) 27 mg iron- 800 mcg Tab tablet [PRENATAL VITAMIN IRON-FOLIC ACID 27MG-0.8MG (PRENATAL S) 27 MG IRON- 800 MCG TAB TABLET] Take 1 tablet by mouth daily.   12/17/2024    thin (NO BRAND SPECIFIED) lancets Use with lanceting device four times daily.  To accompany: Blood Glucose Monitor Brands: per insurance. 100 each 6 12/17/2024   .        Maternal Past Medical History:     Past Medical History:   Diagnosis Date    Diabetes (H)     Gestational when pregnant with the first kid    Migraines     Palpitation     has felt some palpitations over the last 3 weeks    Thyroid disease 2017    Uncomplicated asthma     When a i was a kid                       Family History:   This patient has no significant family history            Social History:   This patient has no significant social history         Review of Systems:   The Review of Systems is negative other than noted in the HPI          Physical Exam:   Vitals were reviewed  /70 (BP Location: Left arm, Patient Position: Semi-Faria's, Cuff Size: Adult Regular)   Pulse 112   Temp 98.2  F (36.8  C) (Oral)   Resp 16   Ht 1.499 m (4' 11\")   Wt 71.2 kg (157 lb)   Breastfeeding Yes   BMI 31.71 kg/m    Constitutional:   awake, alert, cooperative, no apparent distress, and appears stated age     Lungs:   No increased work of breathing, " good air exchange, clear to auscultation bilaterally, no crackles or wheezing     Cardiovascular:   Normal apical impulse, regular rate and rhythm, normal S1 and S2, no S3 or S4, and no murmur noted     Uterus:  Gravid        Musculoskeletal:   no lower extremity pitting edema present      Cervix:   Membranes: intact   Dilation: finger tip   Effacement: 50%   Station:-2   Consistency: average   Position: Mid  Presentation:Cephalic  Fetal Heart Rate Tracing: reactive and reassuring  Tocometer: external monitor                       Assessment:   Juan J Burgess is a 38w1d pregnant female admitted with induction of labor, indication Fetal growth restriction.          Plan:   cervical ripening with misoprostol  Labor induction with cytotec.  Labor epidural when needed.  Diabetic orders places.  On Levothyroxine.    Margo Courtney MD

## 2024-12-18 LAB
GLUCOSE BLDC GLUCOMTR-MCNC: 100 MG/DL (ref 70–99)
HGB BLD-MCNC: 10.9 G/DL (ref 11.7–15.7)

## 2024-12-18 PROCEDURE — 59400 OBSTETRICAL CARE: CPT | Performed by: FAMILY MEDICINE

## 2024-12-18 PROCEDURE — 722N000001 HC LABOR CARE VAGINAL DELIVERY SINGLE

## 2024-12-18 PROCEDURE — 999N000016 HC STATISTIC ATTENDANCE AT DELIVERY

## 2024-12-18 PROCEDURE — 120N000001 HC R&B MED SURG/OB

## 2024-12-18 PROCEDURE — 250N000011 HC RX IP 250 OP 636: Performed by: FAMILY MEDICINE

## 2024-12-18 PROCEDURE — 250N000013 HC RX MED GY IP 250 OP 250 PS 637: Performed by: FAMILY MEDICINE

## 2024-12-18 PROCEDURE — 0UQMXZZ REPAIR VULVA, EXTERNAL APPROACH: ICD-10-PCS | Performed by: FAMILY MEDICINE

## 2024-12-18 PROCEDURE — 36415 COLL VENOUS BLD VENIPUNCTURE: CPT | Performed by: FAMILY MEDICINE

## 2024-12-18 PROCEDURE — 85018 HEMOGLOBIN: CPT | Performed by: FAMILY MEDICINE

## 2024-12-18 PROCEDURE — 250N000009 HC RX 250: Performed by: FAMILY MEDICINE

## 2024-12-18 RX ORDER — LEVOTHYROXINE SODIUM 175 UG/1
175 TABLET ORAL DAILY
Status: DISCONTINUED | OUTPATIENT
Start: 2024-12-18 | End: 2024-12-20 | Stop reason: HOSPADM

## 2024-12-18 RX ORDER — FERROUS SULFATE 325(65) MG
325 TABLET ORAL DAILY
Status: DISCONTINUED | OUTPATIENT
Start: 2024-12-18 | End: 2024-12-20 | Stop reason: HOSPADM

## 2024-12-18 RX ORDER — MODIFIED LANOLIN
OINTMENT (GRAM) TOPICAL
Status: DISCONTINUED | OUTPATIENT
Start: 2024-12-18 | End: 2024-12-20 | Stop reason: HOSPADM

## 2024-12-18 RX ORDER — NICOTINE POLACRILEX 4 MG
15-30 LOZENGE BUCCAL
Status: DISCONTINUED | OUTPATIENT
Start: 2024-12-18 | End: 2024-12-20 | Stop reason: HOSPADM

## 2024-12-18 RX ORDER — ACETAMINOPHEN 325 MG/1
650 TABLET ORAL EVERY 4 HOURS PRN
Status: DISCONTINUED | OUTPATIENT
Start: 2024-12-18 | End: 2024-12-20 | Stop reason: HOSPADM

## 2024-12-18 RX ORDER — HYDROCORTISONE 25 MG/G
CREAM TOPICAL 3 TIMES DAILY PRN
Status: DISCONTINUED | OUTPATIENT
Start: 2024-12-18 | End: 2024-12-20 | Stop reason: HOSPADM

## 2024-12-18 RX ORDER — LOPERAMIDE HYDROCHLORIDE 2 MG/1
4 CAPSULE ORAL
Status: DISCONTINUED | OUTPATIENT
Start: 2024-12-18 | End: 2024-12-20 | Stop reason: HOSPADM

## 2024-12-18 RX ORDER — MISOPROSTOL 200 UG/1
800 TABLET ORAL
Status: DISCONTINUED | OUTPATIENT
Start: 2024-12-18 | End: 2024-12-20 | Stop reason: HOSPADM

## 2024-12-18 RX ORDER — LOPERAMIDE HYDROCHLORIDE 2 MG/1
2 CAPSULE ORAL
Status: DISCONTINUED | OUTPATIENT
Start: 2024-12-18 | End: 2024-12-20 | Stop reason: HOSPADM

## 2024-12-18 RX ORDER — DOCUSATE SODIUM 100 MG/1
100 CAPSULE, LIQUID FILLED ORAL DAILY
Status: DISCONTINUED | OUTPATIENT
Start: 2024-12-18 | End: 2024-12-20 | Stop reason: HOSPADM

## 2024-12-18 RX ORDER — MISOPROSTOL 200 UG/1
400 TABLET ORAL
Status: DISCONTINUED | OUTPATIENT
Start: 2024-12-18 | End: 2024-12-20 | Stop reason: HOSPADM

## 2024-12-18 RX ORDER — DEXTROSE MONOHYDRATE 25 G/50ML
25-50 INJECTION, SOLUTION INTRAVENOUS
Status: DISCONTINUED | OUTPATIENT
Start: 2024-12-18 | End: 2024-12-20 | Stop reason: HOSPADM

## 2024-12-18 RX ORDER — METHYLERGONOVINE MALEATE 0.2 MG/ML
200 INJECTION INTRAVENOUS
Status: DISCONTINUED | OUTPATIENT
Start: 2024-12-18 | End: 2024-12-20 | Stop reason: HOSPADM

## 2024-12-18 RX ORDER — BISACODYL 10 MG
10 SUPPOSITORY, RECTAL RECTAL DAILY PRN
Status: DISCONTINUED | OUTPATIENT
Start: 2024-12-18 | End: 2024-12-20 | Stop reason: HOSPADM

## 2024-12-18 RX ORDER — OXYTOCIN 10 [USP'U]/ML
10 INJECTION, SOLUTION INTRAMUSCULAR; INTRAVENOUS
Status: DISCONTINUED | OUTPATIENT
Start: 2024-12-18 | End: 2024-12-20 | Stop reason: HOSPADM

## 2024-12-18 RX ORDER — TRANEXAMIC ACID 10 MG/ML
1 INJECTION, SOLUTION INTRAVENOUS EVERY 30 MIN PRN
Status: DISCONTINUED | OUTPATIENT
Start: 2024-12-18 | End: 2024-12-20 | Stop reason: HOSPADM

## 2024-12-18 RX ORDER — OXYTOCIN/0.9 % SODIUM CHLORIDE 30/500 ML
340 PLASTIC BAG, INJECTION (ML) INTRAVENOUS CONTINUOUS PRN
Status: DISCONTINUED | OUTPATIENT
Start: 2024-12-18 | End: 2024-12-20 | Stop reason: HOSPADM

## 2024-12-18 RX ORDER — CARBOPROST TROMETHAMINE 250 UG/ML
250 INJECTION, SOLUTION INTRAMUSCULAR
Status: DISCONTINUED | OUTPATIENT
Start: 2024-12-18 | End: 2024-12-20 | Stop reason: HOSPADM

## 2024-12-18 RX ORDER — IBUPROFEN 800 MG/1
800 TABLET, FILM COATED ORAL EVERY 6 HOURS PRN
Status: DISCONTINUED | OUTPATIENT
Start: 2024-12-18 | End: 2024-12-20 | Stop reason: HOSPADM

## 2024-12-18 RX ADMIN — IBUPROFEN 800 MG: 800 TABLET, FILM COATED ORAL at 18:01

## 2024-12-18 RX ADMIN — DOCUSATE SODIUM 100 MG: 100 CAPSULE, LIQUID FILLED ORAL at 10:31

## 2024-12-18 RX ADMIN — LEVOTHYROXINE SODIUM 175 MCG: 175 TABLET ORAL at 10:31

## 2024-12-18 RX ADMIN — ACETAMINOPHEN 650 MG: 325 TABLET ORAL at 20:42

## 2024-12-18 RX ADMIN — LIDOCAINE HYDROCHLORIDE 20 ML: 10 INJECTION, SOLUTION EPIDURAL; INFILTRATION; INTRACAUDAL; PERINEURAL at 01:16

## 2024-12-18 RX ADMIN — ACETAMINOPHEN 650 MG: 325 TABLET ORAL at 10:31

## 2024-12-18 RX ADMIN — IBUPROFEN 800 MG: 800 TABLET, FILM COATED ORAL at 12:04

## 2024-12-18 RX ADMIN — KETOROLAC TROMETHAMINE 30 MG: 30 INJECTION, SOLUTION INTRAMUSCULAR at 06:04

## 2024-12-18 RX ADMIN — FERROUS SULFATE TAB 325 MG (65 MG ELEMENTAL FE) 325 MG: 325 (65 FE) TAB at 10:31

## 2024-12-18 ASSESSMENT — ACTIVITIES OF DAILY LIVING (ADL)
ADLS_ACUITY_SCORE: 26
ADLS_ACUITY_SCORE: 26
ADLS_ACUITY_SCORE: 22
ADLS_ACUITY_SCORE: 26
ADLS_ACUITY_SCORE: 27
ADLS_ACUITY_SCORE: 26
ADLS_ACUITY_SCORE: 26
ADLS_ACUITY_SCORE: 27
ADLS_ACUITY_SCORE: 26
ADLS_ACUITY_SCORE: 27
ADLS_ACUITY_SCORE: 26
ADLS_ACUITY_SCORE: 22
ADLS_ACUITY_SCORE: 26
ADLS_ACUITY_SCORE: 22
ADLS_ACUITY_SCORE: 26
ADLS_ACUITY_SCORE: 22
ADLS_ACUITY_SCORE: 27
ADLS_ACUITY_SCORE: 26
ADLS_ACUITY_SCORE: 26
ADLS_ACUITY_SCORE: 27

## 2024-12-18 NOTE — PROGRESS NOTES
Progress note:  Last does of Cytotec was 1800, was 4th dose.  AROM performed by this MD with clear fluid at about 1950.  Cervix 3 cm, 70%, -2 station and head well applied to cervix.  Zabrina every 1 1/2 to 2 min and rating about a 2/10.    Stop Cytotec.  Will start Pitocin augmentation.  Labor epidural when needed.  This provider in house.  Margo Courtney MD

## 2024-12-18 NOTE — CONSULTS
"[facility] Labor and Delivery In House OB Consult    Pedro Luis Burgess MRN# [id number]   Age: 29 year old YOB: 1995     Date of Admission:  2024    Primary care provider: Margo Courtney           Chief Complaint:   Pedro Luis Burgess is a 29 year old female who is 38w2d pregnant and being admitted for induction of labor due to GDM, hypothyroid and IUGR.  Patient admitted under care of Dr. Courtney and started with cytotec then AROM and pitocin.  I was called to bedside as patient passed large clot and followed with prolonged FHR deceleration.  On my arrival, Dr. Courtney at bedside and patient on hands and knees, mobilizing well with epidural and FHR back to baseline.  Per team, had progressed quickly from 5 cm and now complete          Pregnancy history:     OBSTETRIC HISTORY:    OB History    Para Term  AB Living   3 2 2 0 0 2   SAB IAB Ectopic Multiple Live Births   0 0 0 0 2      # Outcome Date GA Lbr Thomas/2nd Weight Sex Type Anes PTL Lv   3 Current            2 Term 10/01/22 41w1d / 00:40 3.19 kg (7 lb 0.5 oz) F Vag-Spont EPI N NASIR      Name: SIMONAFEMALE-PEDRO LUIS      Apgar1: 8  Apgar5: 9   1 Term 17 39w5d / 01:33 2.722 kg (6 lb) M Vag-Spont EPI, Nitrous N NASIR      Name: SIMONAMALE-PEDRO LUIS      Apgar1: 5  Apgar5: 9       EDC: Estimated Date of Delivery: 24    Prenatal Labs:   Lab Results   Component Value Date    AS Negative 2024    HEPBANG Nonreactive 2024    CHPCRT Negative 2024    GCPCRT Negative 2024    HGB 11.7 2024       GBS Status:   No results found for: \"GBS\"    Active Problem List  Patient Active Problem List   Diagnosis    Acquired hypothyroidism    Hashimoto's disease    History of gestational diabetes mellitus    Irregular periods/menstrual cycles    Polycystic ovaries    Subchorionic hemorrhage    Vitamin D deficiency    Encounter for supervision of other normal pregnancy in third " trimester    Pregnancy    Vaginitis and vulvovaginitis    Seasonal allergic rhinitis, unspecified trigger    Asthma    Diet controlled gestational diabetes mellitus (GDM) in third trimester    Pregnancy affected by fetal growth restriction       Medication Prior to Admission  Medications Prior to Admission   Medication Sig Dispense Refill Last Dose/Taking    blood glucose (NO BRAND SPECIFIED) test strip Use to test blood sugar 4 times daily or as directed. To accompany: Blood Glucose Monitor Brands: per insurance. 100 strip 6 12/17/2024    Blood Glucose Monitoring Suppl (ACCU-CHEK GUIDE) w/Device KIT USE TO TEST BLOOD SUGAR 4 TIMES DAILY OR AS DIRECTED.   12/17/2024    levothyroxine (SYNTHROID/LEVOTHROID) 175 MCG tablet Take 1 tablet (175 mcg) by mouth daily. 90 tablet 1 12/17/2024    prenatal vitamin iron-folic acid 27mg-0.8mg (PRENATAL S) 27 mg iron- 800 mcg Tab tablet [PRENATAL VITAMIN IRON-FOLIC ACID 27MG-0.8MG (PRENATAL S) 27 MG IRON- 800 MCG TAB TABLET] Take 1 tablet by mouth daily.   12/17/2024    thin (NO BRAND SPECIFIED) lancets Use with lanceting device four times daily.  To accompany: Blood Glucose Monitor Brands: per insurance. 100 each 6 12/17/2024    alcohol swab prep pads Use to swab area of injection/thea as directed. 100 each 3    .        Maternal Past Medical History:     Past Medical History:   Diagnosis Date    Diabetes (H)     Gestational when pregnant with the first kid    Migraines     Palpitation     has felt some palpitations over the last 3 weeks    Thyroid disease 2017    Uncomplicated asthma     When a i was a kid          Physical Exam:   Vitals were reviewed  Alert  Resp non labored  Ab gravid    I remained at bedisde, dorantes removed and patient began to push with marked variable decelerations to the 60s and recovery to 170 with decreased variability.  With three pushes, vertex was brought to near crown and delivery team called.  Dr. Courtney than completed delivery.                        Assessment:   Juan J Burgess is a 38w2d pregnant female  with category 2 FHR tracing and imminent delivery.          Plan:   See full delivery note from Dr. Courtney, I left the bedside after delivery, delivery team present to perform any resuscitative measures for infant.    I am available if any further assistance is needed.    Michelle Peters MD    Total time at bedside and in coordination of care is 25 minutes.

## 2024-12-18 NOTE — ANESTHESIA PROCEDURE NOTES
"Epidural catheter Procedure Note    Pre-Procedure   Staff -        Anesthesiologist:  Daniela Keen MD       Performed By: anesthesiologist       Location: OB       Procedure Start/Stop Times: 12/17/2024 11:15 PM and 12/17/2024 11:32 PM       Pre-Anesthestic Checklist: patient identified, IV checked, risks and benefits discussed, informed consent, monitors and equipment checked, pre-op evaluation, at physician/surgeon's request and post-op pain management  Timeout:       Correct Patient: Yes        Correct Procedure: Yes        Correct Site: Yes        Correct Position: Yes   Procedure Documentation  Procedure: epidural catheter       Patient Position: sitting       Patient Prep/Sterile Barriers: x2       Skin prep: Chloraprep       Local skin infiltrated with mL of 1% lidocaine.        Insertion Site: L3-4. (midline approach).       Technique: LORT saline and LORT air        AUGUSTIN at 6 cm.       Needle Type: Packetzoomy needle       Needle Gauge: 18.        Needle Length (Inches): 3.5        Catheter: 20 G.          Catheter threaded easily.           Threaded 11 cm at skin.         # of attempts: 2 and  # of redirects:  2    Assessment/Narrative         Paresthesias: No.       Test dose of 3 mL lidocaine 1.5% w/ 1:200,000 epinephrine at 23:27 CST.         Test dose negative, 3 minutes after injection, for signs of intravascular, subdural, or intrathecal injection.       Insertion/Infusion Method: LORT saline and LORT air       Aspiration negative for Heme or CSF via Epidural Catheter.    Medication(s) Administered   0.25% Bupivacaine PF (Epidural) - EPIDURAL   5 mL - 12/17/2024 11:30:00 PM  Medication Administration Time: 12/17/2024 11:15 PM      FOR [facility] ([facility]) ONLY:   Pain Team Contact information: please page the Pain Team Via [employer]. Search \"Pain\". During daytime hours, please page the attending first. At night please page the resident first.      "

## 2024-12-18 NOTE — ANESTHESIA PREPROCEDURE EVALUATION
Anesthesia Pre-Procedure Evaluation    Patient: Juan J Burgess   MRN: [id number] : 1995        Procedure :           Past Medical History:   Diagnosis Date    Diabetes (H)     Gestational when pregnant with the first kid    Migraines     Palpitation     has felt some palpitations over the last 3 weeks    Thyroid disease 2017    Uncomplicated asthma     When a i was a kid      Past Surgical History:   Procedure Laterality Date    DILATION AND CURETTAGE SUCTION N/A 10/14/2022    Procedure: SUCTION DILATION AND CURETTAGE;  Surgeon: Jerrod Bliss MD;  Location: [facility] Main OR    WISDOM TOOTH EXTRACTION  ?      No Known Allergies   Social History     Tobacco Use    Smoking status: Never     Passive exposure: Never    Smokeless tobacco: Never   Substance Use Topics    Alcohol use: Not Currently      Wt Readings from Last 1 Encounters:   24 71.2 kg (157 lb)        Anesthesia Evaluation   Pt has had prior anesthetic. Type: OB Labor Epidural.    No history of anesthetic complications       ROS/MED HX  ENT/Pulmonary:  - neg pulmonary ROS   (+)                      asthma               (-) sleep apnea   Neurologic:  - neg neurologic ROS     Cardiovascular:  - neg cardiovascular ROS  (-) hypertension, CAD and dyslipidemia   METS/Exercise Tolerance:     Hematologic:  - neg hematologic  ROS     Musculoskeletal:  - neg musculoskeletal ROS     GI/Hepatic:  - neg GI/hepatic ROS  (-) GERD and esophageal disease   Renal/Genitourinary:  - neg Renal ROS     Endo:  - neg endo ROS   (+)          thyroid problem,        gestational diabetes,    Psychiatric/Substance Use:  - neg psychiatric ROS     Infectious Disease:  - neg infectious disease ROS     Malignancy:       Other:            Physical Exam    Airway        Mallampati: II   TM distance: > 3 FB   Neck ROM: full   Mouth opening: > 3 cm    Respiratory Devices and Support         Dental  no notable dental history         Cardiovascular    "cardiovascular exam normal          Pulmonary   pulmonary exam normal                OUTSIDE LABS:  CBC:   Lab Results   Component Value Date    WBC 11.0 06/17/2024    WBC 11.5 (H) 10/08/2022    HGB 11.7 12/17/2024    HGB 11.7 12/16/2024    HCT 34.3 (L) 06/17/2024    HCT 40.2 10/08/2022     06/17/2024     10/08/2022     BMP:   Lab Results   Component Value Date     12/17/2024    POTASSIUM 3.9 12/17/2024    CHLORIDE 103 12/17/2024    CO2 20 (L) 12/17/2024    BUN 12.4 12/17/2024    CR 0.79 12/17/2024    GLC 92 12/17/2024     (H) 12/17/2024     COAGS: No results found for: \"PTT\", \"INR\", \"FIBR\"  POC:   Lab Results   Component Value Date    HCG Positive (A) 01/24/2022     HEPATIC: No results found for: \"ALBUMIN\", \"PROTTOTAL\", \"ALT\", \"AST\", \"GGT\", \"ALKPHOS\", \"BILITOTAL\", \"BILIDIRECT\", \"CAITLYN\"  OTHER:   Lab Results   Component Value Date    A1C 5.6 12/16/2024    ESTRELLA 9.5 12/17/2024    TSH 0.30 12/16/2024    T4 0.73 (L) 11/11/2024       Anesthesia Plan    ASA Status:  2       Anesthesia Type: Epidural.              Consents    Anesthesia Plan(s) and associated risks, benefits, and realistic alternatives discussed. Questions answered and patient/representative(s) expressed understanding.     - Discussed:     - Discussed with:  Patient            Postoperative Care            Comments:    Other Comments: RBA discussed including infection, bleeding, nerve damage, headache, unilateral block, and possible need to replace. Patient expressed understanding and desired to proceed.           neg OB ROS.      Daniela Keen MD    I have reviewed the pertinent notes and labs in the chart from the past 30 days and (re)examined the patient.  Any updates or changes from those notes are reflected in this note.                             # Asthma: noted on problem list       "

## 2024-12-18 NOTE — PLAN OF CARE
Goal Outcome Evaluation:      Plan of Care Reviewed With: patient    Overall Patient Progress: no changeOverall Patient Progress: no change    Pt doing well though not yet in active labor.  Will continue care plan as pt becomes active and delivers.  Inessa Lord RN

## 2024-12-18 NOTE — L&D DELIVERY NOTE
Delivery Summary    Juan J Burgess MRN# [id number]   Age: 29 year old YOB: 1995     ASSESSMENT & PLAN:  29 year old now , baby with fetal growth restriction and needed 38 week induction.  Hypothyroid, obese, GDM diet controlled.  Declined all vaccines. Vit D deficiency.  Prolonged Deceleration into the 60's and Passed a large clot just before becoming complete.  Patient placed on hands and knees and I Placed fetal scalp electrode, we called in house OBGYN and fetal heart tones recovered.  Did have deep variables with pushing. Patient was able to push baby out quickly, Apgars 7 and 9.  Dr. Courtney performed the delivery, thank you Dr. Peters.  Ordered hemoglobin for 0800, will ask rounder to check on that as they will not likely see patient until 24.  Patient Agreeable to stay until 24 since baby SGA and would like home nurse visit 24 or 24.    If Hemoglobin under 11, can start Iron daily.  Check blood sugar before breakfast in the am.  Continue Levothyroxine.       Taz Burgess-Juan J [[id number]]      Labor Events     labor?: No   steroids: None  Labor Type: Induction/Cervical ripening, Augmentation, AROM  Predominate monitoring during 1st stage: continuous electronic fetal monitoring     Antibiotics received during labor?: No       Rupture date/time: 24   Rupture type: Artificial Rupture of Membranes  Fluid color: Clear  Fluid odor: Normal     Induction: Misoprostol, AROM  Induction date/time:      Cervical ripening date/time:      Indications for induction: Other Pregnant Patient Indications (Comment to specify)     Augmentation: Oxytocin  Indications for augmentation: Ineffective Contraction Pattern       Delivery/Placenta Date and Time      Delivery Date: 24 Delivery Time:  1:05 AM   Placenta Date/Time: 2024  1:09 AM  Delivering clinician: Margo Courtney MD   Other personnel present at delivery:  Provider Role    Michelle Peters MD MD Beyer, Emily B RN    Janell Mccormick, RN    Devin, Jaziel MONTENEGRO, RN              Vaginal Counts              Needles Suture Needles Sponges (RETIRED) Instruments   Initial counts 0 0 0    Added to count 1 1 5    Relief counts       Final counts               Placed during labor Accounted for at the end of labor   FSE Yes Yes   IUPC No No   Cervidil No No                             Apgars    Living status: Living   1 Minute 5 Minute 10 Minute 15 Minute 20 Minute   Skin color: 0  1       Heart rate: 2  2       Reflex irritability: 2  2       Muscle tone: 1  2       Respiratory effort: 2  2       Total: 7  9       Apgars assigned by: MARILEE RIOS       Cord      Cord Complications: None               Stem cell collection?: No           Byars Resuscitation    Methods: Suctioning, Oximetry  Byars Care at Delivery: Requested by Dr. Courtney to attend the vaginal delivery of this term, female infant with a gestational age of 38 2/7 weeks secondary to IUGR, and decels.      Infant delivered at 0105 hours on 2024. The infant was stimulated, cried and had spontaneous respirations during delayed cord clamping. The infant was placed on a warmer, dried and stimulated. Infant was suctioned for large amount of bloody fluid.  Pulse ox placed on right wrist and O2 saturations at 5 min of age were noted to be 98%.  Lungs were clear and equal bilaterally on auscultation. Infant required no further resuscitation. Apgars were 7 at one minute and 9 at five minutes of age. Gross physical exam is WNL. Infant was left in delivery room with mother and father, handoff given to postpartum nurse.    This resuscitation and all procedures were performed by this author.    Edie RIOS, CNP 2024 1:24 AM   Advanced [facility] Providers - [facility]          Measurements      Weight: 5 lb 10.3 oz           Labor Events and Shoulder Dystocia    Fetal Tracing Prior to  Delivery: Category 2  Fetal Tracing Comments: Passed Large clot just before becoming complete, fetal deceleration to 60's, recovered with position change and in between pushing  Shoulder dystocia present?: Neg       Delivery (Maternal) (Provider to Complete) ([id number])    Episiotomy: None  Perineal lacerations: None      Labial laceration: bilateral Repaired?: Yes   Repair suture: 4-0 Vicryl  Number of repair packets: 1  Genital tract inspection done: Pos       Blood Loss  Mother: Juan J Burgess #[id number]     Start of Mother's Information      Delivery Blood Loss   Intrapartum & Postpartum: 12/17/24 1305 - 12/18/24 0209    Delivery Admission: 12/17/24 0852 - 12/18/24 0209         Intrapartum & Postpartum Delivery Admission    None                  End of Mother's Information  Mother: Juan J Burgess #[id number]                Delivery - Provider to Complete ([id number])    Delivering clinician: Margo Courtney MD  Delivery Type (Choose the 1 that will go to the Birth History): Vaginal, Spontaneous                         Other personnel:  Provider Role   Michelle Peters MD MD Beyer, Emily B RN    Janell Mccormick, RN    Devin, Jaziel MONTENEGRO RN                     Placenta    Date/Time: 12/18/2024  1:09 AM  Removal: Spontaneous  Disposition: Hospital disposal             Anesthesia    Method: Epidural                    Presentation and Position    Presentation: Vertex    Position: Right Occiput Anterior                     Margo Courtney MD

## 2024-12-18 NOTE — PLAN OF CARE
Problem: Adult Inpatient Plan of Care  Goal: Plan of Care Review  Description: The Plan of Care Review/Shift note should be completed every shift.  The Outcome Evaluation is a brief statement about your assessment that the patient is improving, declining, or no change.  This information will be displayed automatically on your shift  note.  Outcome: Progressing   Goal Outcome Evaluation:         Patient vital signs are stable. Pain is controlled with pain medications.Voiding without difficulty.

## 2024-12-18 NOTE — PLAN OF CARE
Problem: Adult Inpatient Plan of Care  Goal: Optimal Comfort and Wellbeing  2024 06 by Kavita Henderson RN  Outcome: Progressing  2024 by Kavita Henderson RN  Outcome: Progressing  Intervention: Provide Person-Centered Care  Recent Flowsheet Documentation  Taken 2024 by Kavita Henderson RN  Trust Relationship/Rapport:   care explained   choices provided   emotional support provided   empathic listening provided   questions answered   questions encouraged   reassurance provided   thoughts/feelings acknowledged     Problem: Postpartum (Vaginal Delivery)  Goal: Successful Parent Role Transition  Outcome: Progressing   Goal Outcome Evaluation:       Patients vitals WDL. Fundus firm and at midline. Bleeding is light. Pain controlled by Toradol. Ambulates and voids independently. Bonding well with .     Kavita Henderson RN

## 2024-12-19 VITALS
SYSTOLIC BLOOD PRESSURE: 125 MMHG | RESPIRATION RATE: 16 BRPM | HEART RATE: 79 BPM | DIASTOLIC BLOOD PRESSURE: 61 MMHG | OXYGEN SATURATION: 99 % | TEMPERATURE: 98.6 F | BODY MASS INDEX: 31.65 KG/M2 | WEIGHT: 157 LBS | HEIGHT: 59 IN

## 2024-12-19 LAB — GLUCOSE BLDC GLUCOMTR-MCNC: 92 MG/DL (ref 70–99)

## 2024-12-19 PROCEDURE — 120N000001 HC R&B MED SURG/OB

## 2024-12-19 PROCEDURE — 99231 SBSQ HOSP IP/OBS SF/LOW 25: CPT | Performed by: FAMILY MEDICINE

## 2024-12-19 PROCEDURE — 250N000013 HC RX MED GY IP 250 OP 250 PS 637: Performed by: FAMILY MEDICINE

## 2024-12-19 RX ADMIN — IBUPROFEN 800 MG: 800 TABLET, FILM COATED ORAL at 09:31

## 2024-12-19 RX ADMIN — FERROUS SULFATE TAB 325 MG (65 MG ELEMENTAL FE) 325 MG: 325 (65 FE) TAB at 09:31

## 2024-12-19 RX ADMIN — LEVOTHYROXINE SODIUM 175 MCG: 175 TABLET ORAL at 06:56

## 2024-12-19 RX ADMIN — DOCUSATE SODIUM 100 MG: 100 CAPSULE, LIQUID FILLED ORAL at 09:31

## 2024-12-19 RX ADMIN — ACETAMINOPHEN 650 MG: 325 TABLET ORAL at 14:12

## 2024-12-19 RX ADMIN — IBUPROFEN 800 MG: 800 TABLET, FILM COATED ORAL at 23:31

## 2024-12-19 RX ADMIN — IBUPROFEN 800 MG: 800 TABLET, FILM COATED ORAL at 16:16

## 2024-12-19 ASSESSMENT — ACTIVITIES OF DAILY LIVING (ADL)
ADLS_ACUITY_SCORE: 26

## 2024-12-19 NOTE — LACTATION NOTE
This note was copied from a baby's chart.  Lactation visit for mom Juan J and one day old term  girl Rebecca. Mom with gestational diabetes.  3rd baby for mom.  Exclusively pumped and bottle fed previous 2 babies.  Planning on the same for Rebecca.  Brought own Medela Pump n Style pump from home but forgot power cord so has been using hospital Medela Symphony pump.  Feels the pump is too strong and causes nipple pain.  Feels confident about 24 mm size flange she is using.  Father of baby to bring in power cord later today.      Discussed in length the relationship between supply and demand in regard to breastfeeding and the importance of lots of breast/nipple stimulation and skin to skin to help promote good milk production.  Suggested letting baby just play at breast.      Nipples are a little flat.  Given nipple shield and instructed on use in case Juan J decides to put baby to breast.  Encouraged to call for help if desired.

## 2024-12-19 NOTE — PLAN OF CARE
Problem: Adult Inpatient Plan of Care  Goal: Optimal Comfort and Wellbeing  Outcome: Progressing  Intervention: Monitor Pain and Promote Comfort  Recent Flowsheet Documentation  Taken 12/19/2024 1012 by Lesli Landrum RN  Pain Management Interventions: medication (see MAR)  Intervention: Provide Person-Centered Care  Recent Flowsheet Documentation  Taken 12/19/2024 1012 by Lesli Landrum RN  Trust Relationship/Rapport:   care explained   choices provided   emotional support provided   empathic listening provided   questions answered   questions encouraged   reassurance provided   thoughts/feelings acknowledged   Goal Outcome Evaluation:      Plan of Care Reviewed With: patient        Patient taking tylenol and ibuprofen for pain. States her pain is well controlled.Vaginal bleeding is scant. Voiding without difficulty.

## 2024-12-19 NOTE — PROGRESS NOTES
"Vaginal Delivery Postpartum Note    Patient Name:  Juan J Burgess  :      1995  MRN:      [id number]      Assessment:  Normal postpartum course.    Plan:    Continue current care.  Ok to discontinue glucose checks - unless clinical concerns  Mild anemia - on PO iron    Subjective:    The patient feels well:  Voiding without difficulty, lochia normal, tolerating normal diet, and passing flatus.  Pain is well controlled with current medications.     Objective:  /70 (BP Location: Left arm, Patient Position: Sitting, Cuff Size: Adult Regular)   Pulse 72   Temp 98.3  F (36.8  C) (Axillary)   Resp 16   Ht 1.499 m (4' 11\")   Wt 71.2 kg (157 lb)   SpO2 98%   Breastfeeding Yes   BMI 31.71 kg/m        Abdomen:  Soft, nontender, uterine fundus is firm.  Extremities: no edema bilaterally    Urinary output is adequate.     Admission on 2024   Component Date Value Ref Range Status    Hemoglobin 2024 11.7  11.7 - 15.7 g/dL Final    Treponema Antibody Total 2024 Nonreactive  Nonreactive Final    ABO/RH(D) 2024 O POS   Final    Antibody Screen 2024 Negative  Negative Final    SPECIMEN EXPIRATION DATE [id number]   Final    Color Urine 2024 Light Yellow  Colorless, Straw, Light Yellow, Yellow Final    Appearance Urine 2024 Clear  Clear Final    Glucose Urine 2024 Negative  Negative mg/dL Final    Bilirubin Urine 2024 Negative  Negative Final    Ketones Urine 2024 Negative  Negative mg/dL Final    Specific Gravity Urine 2024 1.017  1.001 - 1.030 Final    Blood Urine 2024 1.0 mg/dL (A)  Negative Final    pH Urine 2024 6.5  5.0 - 7.0 Final    Protein Albumin Urine 2024 Negative  Negative mg/dL Final    Urobilinogen Urine 2024 <2.0  <2.0 mg/dL Final    Nitrite Urine 2024 Negative  Negative Final    Leukocyte Esterase Urine 2024 Negative  Negative Final    Mucus Urine 2024 Present (A)  None " Seen /LPF Final    RBC Urine 12/17/2024 2  <=2 /HPF Final    WBC Urine 12/17/2024 1  <=5 /HPF Final    Squamous Epithelials Urine 12/17/2024 3 (H)  <=1 /HPF Final    Sodium 12/17/2024 136  135 - 145 mmol/L Final    Potassium 12/17/2024 3.9  3.4 - 5.3 mmol/L Final    Chloride 12/17/2024 103  98 - 107 mmol/L Final    Carbon Dioxide (CO2) 12/17/2024 20 (L)  22 - 29 mmol/L Final    Anion Gap 12/17/2024 13  7 - 15 mmol/L Final    Urea Nitrogen 12/17/2024 12.4  6.0 - 20.0 mg/dL Final    Creatinine 12/17/2024 0.79  0.51 - 0.95 mg/dL Final    GFR Estimate 12/17/2024 >90  >60 mL/min/1.73m2 Final    eGFR calculated using 2021 CKD-EPI equation.    Calcium 12/17/2024 9.5  8.8 - 10.4 mg/dL Final    Reference intervals for this test were updated on 7/16/2024 to reflect our healthy population more accurately. There may be differences in the flagging of prior results with similar values performed with this method. Those prior results can be interpreted in the context of the updated reference intervals.    Glucose 12/17/2024 101 (H)  70 - 99 mg/dL Final    GLUCOSE BY METER POCT 12/17/2024 127 (H)  70 - 99 mg/dL Final    GLUCOSE BY METER POCT 12/17/2024 107 (H)  70 - 99 mg/dL Final    GLUCOSE BY METER POCT 12/17/2024 92  70 - 99 mg/dL Final    Hemoglobin 12/18/2024 10.9 (L)  11.7 - 15.7 g/dL Final    GLUCOSE BY METER POCT 12/18/2024 100 (H)  70 - 99 mg/dL Final    GLUCOSE BY METER POCT 12/19/2024 92  70 - 99 mg/dL Final   Prenatal Office Visit on 12/16/2024   Component Date Value Ref Range Status    Color Urine 12/16/2024 Yellow  Colorless, Straw, Light Yellow, Yellow Final    Appearance Urine 12/16/2024 Clear  Clear Final    Glucose Urine 12/16/2024 Negative  Negative mg/dL Final    Bilirubin Urine 12/16/2024 Negative  Negative Final    Ketones Urine 12/16/2024 Negative  Negative mg/dL Final    Specific Gravity Urine 12/16/2024 1.020  1.005 - 1.030 Final    Blood Urine 12/16/2024 Negative  Negative Final    pH Urine 12/16/2024 6.5   5.0 - 8.0 Final    Protein Albumin Urine 12/16/2024 Negative  Negative mg/dL Final    Urobilinogen Urine 12/16/2024 0.2  0.2, 1.0 E.U./dL Final    Nitrite Urine 12/16/2024 Negative  Negative Final    Leukocyte Esterase Urine 12/16/2024 Trace (A)  Negative Final    TSH 12/16/2024 0.30  0.30 - 4.20 uIU/mL Final    Hemoglobin 12/16/2024 11.7  11.7 - 15.7 g/dL Final    Vitamin D, Total (25-Hydroxy) 12/16/2024 33  20 - 50 ng/mL Final    optimum levels    Estimated Average Glucose 12/16/2024 114  <117 mg/dL Final    Hemoglobin A1C 12/16/2024 5.6  0.0 - 5.6 % Final    Normal <5.7%   Prediabetes 5.7-6.4%    Diabetes 6.5% or higher     Note: Adopted from ADA consensus guidelines.     Immunization History   Administered Date(s) Administered    TDAP (Adacel,Boostrix) 09/20/2017, 07/20/2022         Bhumi Zendejas MD

## 2024-12-19 NOTE — PLAN OF CARE
Problem: Adult Inpatient Plan of Care  Goal: Plan of Care Review  Outcome: Progressing     Problem: Postpartum (Vaginal Delivery)  Goal: Successful Parent Role Transition  Outcome: Progressing     Problem: Postpartum (Vaginal Delivery)  Goal: Optimal Pain Control and Function  Outcome: Progressing     Patient reports minimal pain and declined any pain medication this shift.   AM blood glucose 92. Fundus firm, lochia appropriate. VSS. Ambulating and voiding independently.

## 2024-12-20 VITALS
SYSTOLIC BLOOD PRESSURE: 128 MMHG | BODY MASS INDEX: 30.72 KG/M2 | HEART RATE: 72 BPM | TEMPERATURE: 98 F | WEIGHT: 152.4 LBS | RESPIRATION RATE: 16 BRPM | HEIGHT: 59 IN | OXYGEN SATURATION: 99 % | DIASTOLIC BLOOD PRESSURE: 75 MMHG

## 2024-12-20 PROCEDURE — 250N000013 HC RX MED GY IP 250 OP 250 PS 637: Performed by: FAMILY MEDICINE

## 2024-12-20 PROCEDURE — 99238 HOSP IP/OBS DSCHRG MGMT 30/<: CPT | Performed by: FAMILY MEDICINE

## 2024-12-20 RX ADMIN — DOCUSATE SODIUM 100 MG: 100 CAPSULE, LIQUID FILLED ORAL at 09:42

## 2024-12-20 RX ADMIN — ACETAMINOPHEN 650 MG: 325 TABLET ORAL at 10:27

## 2024-12-20 RX ADMIN — ACETAMINOPHEN 650 MG: 325 TABLET ORAL at 06:37

## 2024-12-20 RX ADMIN — IBUPROFEN 800 MG: 800 TABLET, FILM COATED ORAL at 06:37

## 2024-12-20 RX ADMIN — FERROUS SULFATE TAB 325 MG (65 MG ELEMENTAL FE) 325 MG: 325 (65 FE) TAB at 09:41

## 2024-12-20 RX ADMIN — BENZOCAINE AND LEVOMENTHOL: 200; 5 SPRAY TOPICAL at 09:41

## 2024-12-20 RX ADMIN — LEVOTHYROXINE SODIUM 175 MCG: 175 TABLET ORAL at 06:37

## 2024-12-20 ASSESSMENT — ACTIVITIES OF DAILY LIVING (ADL)
ADLS_ACUITY_SCORE: 26

## 2024-12-20 NOTE — DISCHARGE SUMMARY
[facility] Discharge Summary    Juan J Burgess MRN# [id number]   Age: 29 year old YOB: 1995     Date of Admission:  12/17/2024  Date of Discharge::  12/20/2024  Admitting Physician:  Margo Courtney MD  Discharge Physician:  Bhumi Zendejas MD     Home clinic: [facility] Dr Courtney          Admission Diagnoses:   Pregnancy affected by fetal growth restriction  GDM diet controlled  Obesity  hypothyroidism          Discharge Diagnosis:     See above  Normal spontaneous vaginal delivery          Procedures:     Procedure(s): No additional procedures performed            Medications Prior to Admission:     Medications Prior to Admission   Medication Sig Dispense Refill Last Dose/Taking    blood glucose (NO BRAND SPECIFIED) test strip Use to test blood sugar 4 times daily or as directed. To accompany: Blood Glucose Monitor Brands: per insurance. 100 strip 6 12/17/2024    Blood Glucose Monitoring Suppl (ACCU-CHEK GUIDE) w/Device KIT USE TO TEST BLOOD SUGAR 4 TIMES DAILY OR AS DIRECTED.   12/17/2024    levothyroxine (SYNTHROID/LEVOTHROID) 175 MCG tablet Take 1 tablet (175 mcg) by mouth daily. 90 tablet 1 12/17/2024    prenatal vitamin iron-folic acid 27mg-0.8mg (PRENATAL S) 27 mg iron- 800 mcg Tab tablet [PRENATAL VITAMIN IRON-FOLIC ACID 27MG-0.8MG (PRENATAL S) 27 MG IRON- 800 MCG TAB TABLET] Take 1 tablet by mouth daily.   12/17/2024    thin (NO BRAND SPECIFIED) lancets Use with lanceting device four times daily.  To accompany: Blood Glucose Monitor Brands: per insurance. 100 each 6 12/17/2024    alcohol swab prep pads Use to swab area of injection/thea as directed. 100 each 3              Discharge Medications:     Current Discharge Medication List        CONTINUE these medications which have NOT CHANGED    Details   blood glucose (NO BRAND SPECIFIED) test strip Use to test blood sugar 4 times daily or as directed. To accompany: Blood Glucose Monitor Brands: per  insurance.  Qty: 100 strip, Refills: 6    Associated Diagnoses: Gestational diabetes mellitus (GDM), antepartum, gestational diabetes method of control unspecified      Blood Glucose Monitoring Suppl (ACCU-CHEK GUIDE) w/Device KIT USE TO TEST BLOOD SUGAR 4 TIMES DAILY OR AS DIRECTED.      levothyroxine (SYNTHROID/LEVOTHROID) 175 MCG tablet Take 1 tablet (175 mcg) by mouth daily.  Qty: 90 tablet, Refills: 1    Associated Diagnoses: Acquired hypothyroidism      prenatal vitamin iron-folic acid 27mg-0.8mg (PRENATAL S) 27 mg iron- 800 mcg Tab tablet [PRENATAL VITAMIN IRON-FOLIC ACID 27MG-0.8MG (PRENATAL S) 27 MG IRON- 800 MCG TAB TABLET] Take 1 tablet by mouth daily.      thin (NO BRAND SPECIFIED) lancets Use with lanceting device four times daily.  To accompany: Blood Glucose Monitor Brands: per insurance.  Qty: 100 each, Refills: 6    Associated Diagnoses: Gestational diabetes mellitus (GDM), antepartum, gestational diabetes method of control unspecified      alcohol swab prep pads Use to swab area of injection/thea as directed.  Qty: 100 each, Refills: 3    Associated Diagnoses: Gestational diabetes mellitus (GDM) in second trimester, gestational diabetes method of control unspecified                   Consultations:   No consultations were requested during this admission          Brief History of Labor:   29 year old now , baby with fetal growth restriction and needed 38 week induction.  Hypothyroid, obese, GDM diet controlled.  Declined all vaccines. Vit D deficiency.  Prolonged Deceleration into the 60's and Passed a large clot just before becoming complete.  Patient placed on hands and knees and I Placed fetal scalp electrode, we called in house OBGYN and fetal heart tones recovered.  Did have deep variables with pushing. Patient was able to push baby out quickly, Apgars 7 and 9.  Dr. Courtney performed the delivery, thank you Dr. Peters.  Ordered hemoglobin for 0800.         Hospital Course:   The patient's  hospital course was unremarkable.  On discharge, her pain was well controlled. Vaginal bleeding is similar to peak menstrual flow.  Voiding without difficulty.  Ambulating well and tolerating a normal diet.  No fever.  Formula and breast feeding well.  Infant is stable. She was discharged on post-partum day #2.    Post-partum hemoglobin:   Hemoglobin   Date Value Ref Range Status   12/18/2024 10.9 (L) 11.7 - 15.7 g/dL Final             Discharge Instructions and Follow-Up:     Discharge diet: Regular   Discharge activity: Activity as tolerated   Discharge follow-up: Follow up with primary care provider in 6 weeks           Discharge Disposition:     Discharged to home        Bhumi Zendejas MD      Yes

## 2024-12-20 NOTE — DISCHARGE INSTRUCTIONS
Warning Signs after Having a Baby    Keep this paper on your fridge or somewhere else where you can see it.    Call your provider if you have any of these symptoms up to 12 weeks after having your baby.    Thoughts of hurting yourself or your baby  Pain in your chest or trouble breathing  Severe headache not helped by pain medicine  Eyesight concerns (blurry vision, seeing spots or flashes of light, other changes to eyesight)  Fainting, shaking or other signs of a seizure    Call 9-1-1 if you feel that it is an emergency.     The symptoms below can happen to anyone after giving birth. They can be very serious. Call your provider if you have any of these warning signs.    My provider s phone number: _______________________    Losing too much blood (hemorrhage)    Call your provider if you soak through a pad in less than an hour or pass blood clots bigger than a golf ball. These may be signs that you are bleeding too much.    Blood clots in the legs or lungs    After you give birth, your body naturally clots its blood to help prevent blood loss. Sometimes this increased clotting can happen in other areas of the body, like the legs or lungs. This can block your blood flow and be very dangerous.     Call your provider if you:  Have a red, swollen spot on the back of your leg that is warm or painful when you touch it.   Are coughing up blood.     Infection    Call your provider if you have any of these symptoms:  Fever of 100.4 F (38 C) or higher.  Pain or redness around your stitches if you had an incision.   Any yellow, white, or green fluid coming from places where you had stitches or surgery.    Mood Problems (postpartum depression)    Many people feel sad or have mood changes after having a baby. But for some people, these mood swings are worse.     Call your provider right away if you feel so anxious or nervous that you can't care for yourself or your baby.    Preeclampsia (high blood pressure)    Even if you  didn't have high blood pressure when you were pregnant, you are at risk for the high blood pressure disease called preeclampsia. This risk can last up to 12 weeks after giving birth.     Call your provider if you have:   Pain on your right side under your rib cage  Sudden swelling in the hands and face    Remember: You know your body. If something doesn't feel right, get medical help.     For informational purposes only. Not to replace the advice of your health care provider. Copyright 2020 [facility]. All rights reserved. Clinically reviewed by Doris Squires, RNC-OB, MSN. [employer] [id number] - Rev 02/23.

## 2024-12-20 NOTE — PLAN OF CARE
Problem: Postpartum (Vaginal Delivery)  Goal: Optimal Pain Control and Function  Outcome: Met   Goal Outcome Evaluation:    Stable morning/shift.  Pt's vss.  Pt is up ambulating and voiding independently and is independent with self cares.  Pt desires discharge to home and discharge orders received.       NB is waking on her own for feedings every 2-3 hours.  Pt is bottle feeding NB well with gd technique per per request.  NB is bottle feeding with formula ad jose miguel amts (15-20cc).  Pt is independent with bottle feeding and positioning.  NB is content/sleeps well between feedings.  No emesis after.       Both pt and FOB are very loving, caring, and attentive towards NB.  Both are comfortable, confident, and independent with NB's cares and feedings.  Questions encouraged and answered.       All discharge instructions, follow up apts, NB feedings, PP/NB cares and danger signals all reviewed/discussed with pt with questions encouraged and answered, she verbalizes understanding, and denies any further questions or concerns at this time.       Stable healthy discharge to home.      Zhanna Heredia RN   12/20/2024

## 2024-12-20 NOTE — PROGRESS NOTES
Outreach Note for CRISTI Burgess  [id number]  1995    Discharge follow-up plan discussed with patient, needs assessed. Pt requests all follow-up through clinic/physician, declines home care visit, unless medically indicated and ordered by physician, and declines follow-up phone call.   No further needs identified at this time.

## 2024-12-20 NOTE — PLAN OF CARE
Problem: Adult Inpatient Plan of Care  Goal: Plan of Care Review  Description: The Plan of Care Review/Shift note should be completed every shift.  The Outcome Evaluation is a brief statement about your assessment that the patient is improving, declining, or no change.  This information will be displayed automatically on your shift  note.  Outcome: Progressing     Problem: Adult Inpatient Plan of Care  Goal: Optimal Comfort and Wellbeing  Outcome: Progressing  Intervention: Provide Person-Centered Care  Recent Flowsheet Documentation  Taken 12/20/2024 0000 by Beverly Mejia RN  Trust Relationship/Rapport:   care explained   choices provided   emotional support provided   empathic listening provided   questions answered   questions encouraged   reassurance provided   thoughts/feelings acknowledged     Problem: Diabetes in Pregnancy  Goal: Blood Glucose Level Within Targeted Range  Outcome: Progressing     Problem: Postpartum (Vaginal Delivery)  Goal: Successful Parent Role Transition  Outcome: Progressing     Problem: Postpartum (Vaginal Delivery)  Goal: Hemostasis  Outcome: Progressing   Goal Outcome Evaluation:  Pt vitals stable overnight. Firm and scant bleeding. Minimal pain overnight given ibuprofen.

## 2024-12-21 ENCOUNTER — NURSE TRIAGE (OUTPATIENT)
Dept: NURSING | Facility: CLINIC | Age: 29
End: 2024-12-21
Payer: COMMERCIAL

## 2024-12-21 NOTE — TELEPHONE ENCOUNTER
[place]: Dr Courtney  Childbirth home from hospital yesterday.  Wants to reach the Lactation Consultant.  Breasts are both engorged. Pump does not seem to be draining much. After 40 min of pumping only produces 10 ml each. Nipples are blistering.   She is unable to do breast feeding directly. She pumped with her 1st and 2nd babies. It was a different pump. She wonders if the problem is with her pump ?   Triaged to a disposition of Home Care: given Lactation Consultant clinic #. If unable to reach, she will call [facility] and ask to speak to . Also referred to Eduardo Longoria.    Filomena Snyder RN Triage Nurse Advisor 8:44 AM 12/21/2024  Reason for Disposition   [1] Normal postpartum breast pain and engorgement AND [2] chose not to breastfeed and / or pump    Additional Information   Negative: Sounds like a life-threatening emergency to the triager   Negative: Breastfeeding questions about baby   Negative: Breastfeeding questions about mother's medicines and diet   Negative: [1] Breast symptoms (e.g., lump, pain, redness, swelling) AND [2] mother plans to continue breastfeeding or pumping   Negative: [1] Breast symptoms AND [2] stopped breastfeeding and pumping > 4 weeks ago   Negative: [1] SEVERE breast pain (e.g., excruciating) AND [2] fever > 103 F (39.4 C)   Negative: Patient sounds very sick or weak to the triager   Negative: [1] Breast looks infected (e.g., spreading redness, feels hot or painful to touch) AND [2] fever 100.4 F (38.0 C) or higher   Negative: [1] SEVERE pain AND [2] not improved after 2 hours of pain medicine and Care Advice   Negative: [1] Breast looks infected (e.g., spreading redness) AND [2] no fever   Negative: [1] Breast or nipple pain AND [2] present > 7 days   Negative: [1] Breast lump AND [2] present > 7 days    Protocols used: Postpartum - Breast Pain and [id number]

## 2024-12-23 ENCOUNTER — HOSPITAL ENCOUNTER (INPATIENT)
Facility: HOSPITAL | Age: 29
LOS: 1 days | Discharge: HOME | DRG: 776 | End: 2024-12-24
Attending: EMERGENCY MEDICINE | Admitting: OBSTETRICS & GYNECOLOGY
Payer: COMMERCIAL

## 2024-12-23 ENCOUNTER — NURSE TRIAGE (OUTPATIENT)
Dept: FAMILY MEDICINE | Facility: CLINIC | Age: 29
End: 2024-12-23

## 2024-12-23 ENCOUNTER — APPOINTMENT (OUTPATIENT)
Dept: ULTRASOUND IMAGING | Facility: HOSPITAL | Age: 29
DRG: 776 | End: 2024-12-23
Attending: OBSTETRICS & GYNECOLOGY
Payer: COMMERCIAL

## 2024-12-23 LAB
ABO + RH BLD: NORMAL
APTT PPP: 22 SECONDS (ref 22–38)
APTT PPP: 25 SECONDS (ref 22–38)
BASOPHILS # BLD AUTO: 0 10E3/UL (ref 0–0.2)
BASOPHILS NFR BLD AUTO: 0 %
BLD GP AB SCN SERPL QL: NEGATIVE
BLD PROD TYP BPU: NORMAL
BLOOD COMPONENT TYPE: NORMAL
CODING SYSTEM: NORMAL
CROSSMATCH: NORMAL
CROSSMATCH: NORMAL
EOSINOPHIL # BLD AUTO: 0.3 10E3/UL (ref 0–0.7)
EOSINOPHIL NFR BLD AUTO: 3 %
ERYTHROCYTE [DISTWIDTH] IN BLOOD BY AUTOMATED COUNT: 13.1 % (ref 10–15)
ERYTHROCYTE [DISTWIDTH] IN BLOOD BY AUTOMATED COUNT: 13.8 % (ref 10–15)
FIBRINOGEN PPP-MCNC: 371 MG/DL (ref 170–510)
HCT VFR BLD AUTO: 27.3 % (ref 35–47)
HCT VFR BLD AUTO: 31.7 % (ref 35–47)
HGB BLD-MCNC: 10.6 G/DL (ref 11.7–15.7)
HGB BLD-MCNC: 9 G/DL (ref 11.7–15.7)
HOLD SPECIMEN: NORMAL
IMM GRANULOCYTES # BLD: 0.1 10E3/UL
IMM GRANULOCYTES NFR BLD: 1 %
INR PPP: 1.02 (ref 0.85–1.15)
INR PPP: 1.06 (ref 0.85–1.15)
ISSUE DATE AND TIME: NORMAL
LYMPHOCYTES # BLD AUTO: 4.1 10E3/UL (ref 0.8–5.3)
LYMPHOCYTES NFR BLD AUTO: 31 %
MCH RBC QN AUTO: 27.7 PG (ref 26.5–33)
MCH RBC QN AUTO: 28 PG (ref 26.5–33)
MCHC RBC AUTO-ENTMCNC: 33 G/DL (ref 31.5–36.5)
MCHC RBC AUTO-ENTMCNC: 33.4 G/DL (ref 31.5–36.5)
MCV RBC AUTO: 83 FL (ref 78–100)
MCV RBC AUTO: 85 FL (ref 78–100)
MONOCYTES # BLD AUTO: 0.9 10E3/UL (ref 0–1.3)
MONOCYTES NFR BLD AUTO: 7 %
NEUTROPHILS # BLD AUTO: 7.7 10E3/UL (ref 1.6–8.3)
NEUTROPHILS NFR BLD AUTO: 59 %
NRBC # BLD AUTO: 0 10E3/UL
NRBC BLD AUTO-RTO: 0 /100
PLATELET # BLD AUTO: 246 10E3/UL (ref 150–450)
PLATELET # BLD AUTO: 355 10E3/UL (ref 150–450)
RBC # BLD AUTO: 3.21 10E6/UL (ref 3.8–5.2)
RBC # BLD AUTO: 3.82 10E6/UL (ref 3.8–5.2)
SPECIMEN EXP DATE BLD: NORMAL
UNIT ABO/RH: NORMAL
UNIT NUMBER: NORMAL
UNIT STATUS: NORMAL
UNIT TYPE ISBT: 5100
UNIT TYPE ISBT: 5100
UNIT TYPE ISBT: 9500
WBC # BLD AUTO: 13.2 10E3/UL (ref 4–11)
WBC # BLD AUTO: 23.5 10E3/UL (ref 4–11)

## 2024-12-23 PROCEDURE — 86901 BLOOD TYPING SEROLOGIC RH(D): CPT | Performed by: EMERGENCY MEDICINE

## 2024-12-23 PROCEDURE — 36415 COLL VENOUS BLD VENIPUNCTURE: CPT | Performed by: EMERGENCY MEDICINE

## 2024-12-23 PROCEDURE — 96374 THER/PROPH/DIAG INJ IV PUSH: CPT

## 2024-12-23 PROCEDURE — 250N000011 HC RX IP 250 OP 636: Performed by: OBSTETRICS & GYNECOLOGY

## 2024-12-23 PROCEDURE — 85018 HEMOGLOBIN: CPT | Performed by: OBSTETRICS & GYNECOLOGY

## 2024-12-23 PROCEDURE — 85730 THROMBOPLASTIN TIME PARTIAL: CPT | Performed by: OBSTETRICS & GYNECOLOGY

## 2024-12-23 PROCEDURE — 258N000003 HC RX IP 258 OP 636: Performed by: OBSTETRICS & GYNECOLOGY

## 2024-12-23 PROCEDURE — 250N000013 HC RX MED GY IP 250 OP 250 PS 637: Performed by: OBSTETRICS & GYNECOLOGY

## 2024-12-23 PROCEDURE — P9016 RBC LEUKOCYTES REDUCED: HCPCS

## 2024-12-23 PROCEDURE — 36415 COLL VENOUS BLD VENIPUNCTURE: CPT | Performed by: OBSTETRICS & GYNECOLOGY

## 2024-12-23 PROCEDURE — 258N000003 HC RX IP 258 OP 636: Performed by: EMERGENCY MEDICINE

## 2024-12-23 PROCEDURE — 76856 US EXAM PELVIC COMPLETE: CPT

## 2024-12-23 PROCEDURE — 120N000001 HC R&B MED SURG/OB

## 2024-12-23 PROCEDURE — 86923 COMPATIBILITY TEST ELECTRIC: CPT | Performed by: EMERGENCY MEDICINE

## 2024-12-23 PROCEDURE — 85730 THROMBOPLASTIN TIME PARTIAL: CPT | Performed by: EMERGENCY MEDICINE

## 2024-12-23 PROCEDURE — 85384 FIBRINOGEN ACTIVITY: CPT | Performed by: OBSTETRICS & GYNECOLOGY

## 2024-12-23 PROCEDURE — 85049 AUTOMATED PLATELET COUNT: CPT | Performed by: OBSTETRICS & GYNECOLOGY

## 2024-12-23 PROCEDURE — 99285 EMERGENCY DEPT VISIT HI MDM: CPT | Mod: 25

## 2024-12-23 PROCEDURE — 36430 TRANSFUSION BLD/BLD COMPNT: CPT

## 2024-12-23 PROCEDURE — 85004 AUTOMATED DIFF WBC COUNT: CPT | Performed by: EMERGENCY MEDICINE

## 2024-12-23 PROCEDURE — 85610 PROTHROMBIN TIME: CPT | Performed by: OBSTETRICS & GYNECOLOGY

## 2024-12-23 PROCEDURE — 250N000011 HC RX IP 250 OP 636: Performed by: EMERGENCY MEDICINE

## 2024-12-23 PROCEDURE — 85610 PROTHROMBIN TIME: CPT | Performed by: EMERGENCY MEDICINE

## 2024-12-23 RX ORDER — BISACODYL 10 MG
10 SUPPOSITORY, RECTAL RECTAL DAILY PRN
Status: DISCONTINUED | OUTPATIENT
Start: 2024-12-23 | End: 2024-12-24 | Stop reason: HOSPADM

## 2024-12-23 RX ORDER — OXYTOCIN 10 [USP'U]/ML
10 INJECTION, SOLUTION INTRAMUSCULAR; INTRAVENOUS
Status: DISCONTINUED | OUTPATIENT
Start: 2024-12-23 | End: 2024-12-24 | Stop reason: HOSPADM

## 2024-12-23 RX ORDER — MISOPROSTOL 200 UG/1
800 TABLET ORAL
Status: DISCONTINUED | OUTPATIENT
Start: 2024-12-23 | End: 2024-12-24 | Stop reason: HOSPADM

## 2024-12-23 RX ORDER — CEFAZOLIN SODIUM 2 G/100ML
2 INJECTION, SOLUTION INTRAVENOUS
Status: DISCONTINUED | OUTPATIENT
Start: 2024-12-23 | End: 2024-12-24 | Stop reason: HOSPADM

## 2024-12-23 RX ORDER — NALOXONE HYDROCHLORIDE 0.4 MG/ML
0.4 INJECTION, SOLUTION INTRAMUSCULAR; INTRAVENOUS; SUBCUTANEOUS
Status: DISCONTINUED | OUTPATIENT
Start: 2024-12-23 | End: 2024-12-24 | Stop reason: HOSPADM

## 2024-12-23 RX ORDER — NALOXONE HYDROCHLORIDE 0.4 MG/ML
0.2 INJECTION, SOLUTION INTRAMUSCULAR; INTRAVENOUS; SUBCUTANEOUS
Status: DISCONTINUED | OUTPATIENT
Start: 2024-12-23 | End: 2024-12-24 | Stop reason: HOSPADM

## 2024-12-23 RX ORDER — TRANEXAMIC ACID 10 MG/ML
1 INJECTION, SOLUTION INTRAVENOUS EVERY 30 MIN PRN
Status: DISCONTINUED | OUTPATIENT
Start: 2024-12-23 | End: 2024-12-24 | Stop reason: HOSPADM

## 2024-12-23 RX ORDER — LOPERAMIDE HYDROCHLORIDE 2 MG/1
2 CAPSULE ORAL
Status: DISCONTINUED | OUTPATIENT
Start: 2024-12-23 | End: 2024-12-24 | Stop reason: HOSPADM

## 2024-12-23 RX ORDER — FENTANYL CITRATE 50 UG/ML
100 INJECTION, SOLUTION INTRAMUSCULAR; INTRAVENOUS ONCE
Status: COMPLETED | OUTPATIENT
Start: 2024-12-23 | End: 2024-12-23

## 2024-12-23 RX ORDER — OXYCODONE HYDROCHLORIDE 5 MG/1
5 TABLET ORAL EVERY 4 HOURS PRN
Status: DISCONTINUED | OUTPATIENT
Start: 2024-12-23 | End: 2024-12-24 | Stop reason: HOSPADM

## 2024-12-23 RX ORDER — DOCUSATE SODIUM 100 MG/1
100 CAPSULE, LIQUID FILLED ORAL DAILY
Status: DISCONTINUED | OUTPATIENT
Start: 2024-12-23 | End: 2024-12-24 | Stop reason: HOSPADM

## 2024-12-23 RX ORDER — SODIUM CHLORIDE 9 MG/ML
100 INJECTION, SOLUTION INTRAVENOUS ONCE
Status: COMPLETED | OUTPATIENT
Start: 2024-12-23 | End: 2024-12-23

## 2024-12-23 RX ORDER — ACETAMINOPHEN 500 MG
500-1000 TABLET ORAL EVERY 6 HOURS PRN
COMMUNITY

## 2024-12-23 RX ORDER — OXYTOCIN/0.9 % SODIUM CHLORIDE 30/500 ML
340 PLASTIC BAG, INJECTION (ML) INTRAVENOUS CONTINUOUS PRN
Status: DISCONTINUED | OUTPATIENT
Start: 2024-12-23 | End: 2024-12-24 | Stop reason: HOSPADM

## 2024-12-23 RX ORDER — SODIUM CHLORIDE, SODIUM LACTATE, POTASSIUM CHLORIDE, CALCIUM CHLORIDE 600; 310; 30; 20 MG/100ML; MG/100ML; MG/100ML; MG/100ML
INJECTION, SOLUTION INTRAVENOUS CONTINUOUS
Status: DISCONTINUED | OUTPATIENT
Start: 2024-12-23 | End: 2024-12-24 | Stop reason: HOSPADM

## 2024-12-23 RX ORDER — METHYLERGONOVINE MALEATE 0.2 MG/ML
200 INJECTION INTRAVENOUS
Status: DISCONTINUED | OUTPATIENT
Start: 2024-12-23 | End: 2024-12-24 | Stop reason: HOSPADM

## 2024-12-23 RX ORDER — ACETAMINOPHEN 325 MG/1
650 TABLET ORAL EVERY 4 HOURS PRN
Status: DISCONTINUED | OUTPATIENT
Start: 2024-12-23 | End: 2024-12-24 | Stop reason: HOSPADM

## 2024-12-23 RX ORDER — HYDROCORTISONE 25 MG/G
CREAM TOPICAL 3 TIMES DAILY PRN
Status: DISCONTINUED | OUTPATIENT
Start: 2024-12-23 | End: 2024-12-24 | Stop reason: HOSPADM

## 2024-12-23 RX ORDER — CARBOPROST TROMETHAMINE 250 UG/ML
250 INJECTION, SOLUTION INTRAMUSCULAR
Status: DISCONTINUED | OUTPATIENT
Start: 2024-12-23 | End: 2024-12-24 | Stop reason: HOSPADM

## 2024-12-23 RX ORDER — IBUPROFEN 800 MG/1
800 TABLET, FILM COATED ORAL EVERY 6 HOURS PRN
Status: DISCONTINUED | OUTPATIENT
Start: 2024-12-23 | End: 2024-12-24 | Stop reason: HOSPADM

## 2024-12-23 RX ORDER — LOPERAMIDE HYDROCHLORIDE 2 MG/1
4 CAPSULE ORAL
Status: DISCONTINUED | OUTPATIENT
Start: 2024-12-23 | End: 2024-12-24 | Stop reason: HOSPADM

## 2024-12-23 RX ORDER — MODIFIED LANOLIN
OINTMENT (GRAM) TOPICAL
Status: DISCONTINUED | OUTPATIENT
Start: 2024-12-23 | End: 2024-12-24 | Stop reason: HOSPADM

## 2024-12-23 RX ORDER — ONDANSETRON 2 MG/ML
4 INJECTION INTRAMUSCULAR; INTRAVENOUS EVERY 6 HOURS PRN
Status: DISCONTINUED | OUTPATIENT
Start: 2024-12-23 | End: 2024-12-24 | Stop reason: HOSPADM

## 2024-12-23 RX ORDER — MISOPROSTOL 200 UG/1
400 TABLET ORAL
Status: DISCONTINUED | OUTPATIENT
Start: 2024-12-23 | End: 2024-12-24 | Stop reason: HOSPADM

## 2024-12-23 RX ADMIN — LOPERAMIDE HYDROCHLORIDE 2 MG: 2 CAPSULE ORAL at 16:39

## 2024-12-23 RX ADMIN — OXYCODONE HYDROCHLORIDE 5 MG: 5 TABLET ORAL at 16:39

## 2024-12-23 RX ADMIN — ACETAMINOPHEN 650 MG: 325 TABLET ORAL at 21:00

## 2024-12-23 RX ADMIN — SODIUM CHLORIDE, POTASSIUM CHLORIDE, SODIUM LACTATE AND CALCIUM CHLORIDE: 600; 310; 30; 20 INJECTION, SOLUTION INTRAVENOUS at 18:38

## 2024-12-23 RX ADMIN — ONDANSETRON 4 MG: 2 INJECTION INTRAMUSCULAR; INTRAVENOUS at 16:29

## 2024-12-23 RX ADMIN — FENTANYL CITRATE 100 MCG: 50 INJECTION, SOLUTION INTRAMUSCULAR; INTRAVENOUS at 13:51

## 2024-12-23 RX ADMIN — SODIUM CHLORIDE 100 ML: 9 INJECTION, SOLUTION INTRAVENOUS at 14:48

## 2024-12-23 RX ADMIN — IBUPROFEN 800 MG: 800 TABLET ORAL at 21:00

## 2024-12-23 RX ADMIN — ACETAMINOPHEN 650 MG: 325 TABLET ORAL at 16:39

## 2024-12-23 RX ADMIN — SODIUM CHLORIDE 1000 ML: 9 INJECTION, SOLUTION INTRAVENOUS at 14:00

## 2024-12-23 ASSESSMENT — ACTIVITIES OF DAILY LIVING (ADL)
ADLS_ACUITY_SCORE: 58
ADLS_ACUITY_SCORE: 63
ADLS_ACUITY_SCORE: 37
ADLS_ACUITY_SCORE: 63
ADLS_ACUITY_SCORE: 58
ADLS_ACUITY_SCORE: 37
ADLS_ACUITY_SCORE: 58
ADLS_ACUITY_SCORE: 58
ADLS_ACUITY_SCORE: 63
ADLS_ACUITY_SCORE: 37

## 2024-12-23 ASSESSMENT — COLUMBIA-SUICIDE SEVERITY RATING SCALE - C-SSRS
2. HAVE YOU ACTUALLY HAD ANY THOUGHTS OF KILLING YOURSELF IN THE PAST MONTH?: NO
6. HAVE YOU EVER DONE ANYTHING, STARTED TO DO ANYTHING, OR PREPARED TO DO ANYTHING TO END YOUR LIFE?: NO
1. IN THE PAST MONTH, HAVE YOU WISHED YOU WERE DEAD OR WISHED YOU COULD GO TO SLEEP AND NOT WAKE UP?: NO

## 2024-12-23 NOTE — TELEPHONE ENCOUNTER
Patient calling in to RN line for triage in a state of panic due to vaginal bleeding. Patient is in the bathtub at the time of the call with her  nearby. She reports severe vaginal bleeding (pouring/gushing) with clots that is still ongoing at the time of the call. No other systemic concerns.    Patient is postpartum spontaneous vaginal delivery 5 days ago without complication.     Concern for uterine hemorrhage. Patient and  instructed to call 911 now. Patient to remain in a laying position, elevating feet. They agreed to disposition.    Reason for Disposition   Vaginal bleeding is main symptom   SEVERE vaginal bleeding (e.g., continuous red blood from vagina, or large blood clots) and very weak (can't stand)    Protocols used: Vaginal Symptoms-A-OH, Vaginal Bleeding - [id number]

## 2024-12-23 NOTE — CONSULTS
CONSULTATION - GYN    NAME: Juan J Burgess   : 1995   MRN: [id number]      ADMISSION DATE: 2024    PCP:  Margo Courtney have been requested by Dr. Zabala to evaluate Juan J Burgess for delayed postpartum hemorrhage.     CHIEF COMPLAINT: Vaginal bleeding.    HPI:  Juan J Burgess is a 29 year old  who arrived to the ER via EMS for significant vaginal bleeding.  History is obtained through the patient and chart review, ER providers. She had a   which was uncomplicated. She was at home drying off her child in the bathtub and had sudden severe cramping and watery blood with several small clots that passed. Per ER report her tub was 1-2 inches full of blood, her partner states that there was not much water left in the tub. She received IV TXA and fluids then in the ER had continued watery bleeding therefore was given 2 units of O negative. She was initially hypotensive and symptomatic.   She reports that after her first vaginal delivery she had retained placenta that led to a D&C 12 days after delivery. She notes that she did not have bleeding during that situation, only cramping.       PAST MEDICAL HISTORY:  Past Medical History:   Diagnosis Date    Diabetes (H)     Gestational when pregnant with the first kid    Migraines     Palpitation     has felt some palpitations over the last 3 weeks    Thyroid disease 2017    Uncomplicated asthma     When a i was a kid       PAST SURGICAL HISTORY:  Past Surgical History:   Procedure Laterality Date    DILATION AND CURETTAGE SUCTION N/A 10/14/2022    Procedure: SUCTION DILATION AND CURETTAGE;  Surgeon: Jerrod Bliss MD;  Location: [facility] OR    WISDOM TOOTH EXTRACTION  ?       SOCIAL HISTORY:  Social History     Socioeconomic History    Marital status:      Spouse name: Not on file    Number of children: Not on file    Years of education: Not on file    Highest education level: Not on file    Occupational History    Not on file   Tobacco Use    Smoking status: Never     Passive exposure: Never    Smokeless tobacco: Never   Vaping Use    Vaping status: Never Used   Substance and Sexual Activity    Alcohol use: Not Currently    Drug use: Never    Sexual activity: Yes     Partners: Male     Birth control/protection: None   Other Topics Concern    Parent/sibling w/ CABG, MI or angioplasty before 65F 55M? No   Social History Narrative    Not on file     Social Drivers of Health     Financial Resource Strain: Low Risk  (12/17/2024)    Financial Resource Strain     Within the past 12 months, have you or your family members you live with been unable to get utilities (heat, electricity) when it was really needed?: No   Food Insecurity: Low Risk  (12/17/2024)    Food Insecurity     Within the past 12 months, did you worry that your food would run out before you got money to buy more?: No     Within the past 12 months, did the food you bought just not last and you didn t have money to get more?: No   Transportation Needs: Low Risk  (12/17/2024)    Transportation Needs     Within the past 12 months, has lack of transportation kept you from medical appointments, getting your medicines, non-medical meetings or appointments, work, or from getting things that you need?: No   Physical Activity: Not on file   Stress: Not on file   Social Connections: Not on file   Interpersonal Safety: Low Risk  (12/17/2024)    Interpersonal Safety     Do you feel physically and emotionally safe where you currently live?: Yes     Within the past 12 months, have you been hit, slapped, kicked or otherwise physically hurt by someone?: No     Within the past 12 months, have you been humiliated or emotionally abused in other ways by your partner or ex-partner?: No   Housing Stability: Low Risk  (12/17/2024)    Housing Stability     Do you have housing? : Yes     Are you worried about losing your housing?: No       MEDICATIONS:  No current  "facility-administered medications for this encounter.     Current Outpatient Medications   Medication Sig Dispense Refill    acetaminophen (TYLENOL) 500 MG tablet Take 500-1,000 mg by mouth every 6 hours as needed for mild pain.      levothyroxine (SYNTHROID/LEVOTHROID) 175 MCG tablet Take 1 tablet (175 mcg) by mouth daily. 90 tablet 1    alcohol swab prep pads Use to swab area of injection/thea as directed. 100 each 3    blood glucose (NO BRAND SPECIFIED) test strip Use to test blood sugar 4 times daily or as directed. To accompany: Blood Glucose Monitor Brands: per insurance. 100 strip 6       ALLERGIES:  No Known Allergies    REVIEW OF SYSTEMS    Negative except what is stated in the HPI    PHYSICAL EXAM:  /74   Pulse 91   Temp 97.6  F (36.4  C) (Tympanic)   Resp 27   Ht 1.499 m (4' 11\")   Wt 68.9 kg (152 lb)   SpO2 100%   BMI 30.70 kg/m     General Appearance: Alert, appropriate appearance for age. No acute distress  HEENT : Grossly normal  Chest/Respiratory: Normal chest wall and respirations.   Cardiovascular: Regular rate and rhythm   Gastrointestinal: Soft, uterus at U-1.  Pelvic Exam Female: Sterile gloves donned, exam after Fentanyl and consent showed minimal clot in the vagina, cervix dilated to 2 cm with small amount of clot above. No significant lacerations noted.  Musculoskeletal Exam: Blood dried to legs, no edema.  Psychiatric: Alert and oriented, appropriate affect.    LABS  Recent Results (from the past 24 hours)   Prepare red blood cells (unit)    Collection Time: 12/23/24  1:22 PM   Result Value Ref Range    Blood Component Type Red Blood Cells     Product Code D2696F68     Unit Status Ready for issue     Unit Number [id number]     CROSSMATCH Compatible     CODING SYSTEM [id number]    Prepare red blood cells (unit)    Collection Time: 12/23/24  1:22 PM   Result Value Ref Range    Blood Component Type Red Blood Cells     Product Code P3530Q83     Unit Status Ready for issue     Unit " Number [id number]     CROSSMATCH Compatible     CODING SYSTEM [id number]    INR    Collection Time: 12/23/24  1:24 PM   Result Value Ref Range    INR 1.02 0.85 - 1.15   Partial thromboplastin time    Collection Time: 12/23/24  1:24 PM   Result Value Ref Range    aPTT 22 22 - 38 Seconds   CBC with platelets and differential    Collection Time: 12/23/24  1:24 PM   Result Value Ref Range    WBC Count 13.2 (H) 4.0 - 11.0 10e3/uL    RBC Count 3.21 (L) 3.80 - 5.20 10e6/uL    Hemoglobin 9.0 (L) 11.7 - 15.7 g/dL    Hematocrit 27.3 (L) 35.0 - 47.0 %    MCV 85 78 - 100 fL    MCH 28.0 26.5 - 33.0 pg    MCHC 33.0 31.5 - 36.5 g/dL    RDW 13.1 10.0 - 15.0 %    Platelet Count 355 150 - 450 10e3/uL    % Neutrophils 59 %    % Lymphocytes 31 %    % Monocytes 7 %    % Eosinophils 3 %    % Basophils 0 %    % Immature Granulocytes 1 %    NRBCs per 100 WBC 0 <1 /100    Absolute Neutrophils 7.7 1.6 - 8.3 10e3/uL    Absolute Lymphocytes 4.1 0.8 - 5.3 10e3/uL    Absolute Monocytes 0.9 0.0 - 1.3 10e3/uL    Absolute Eosinophils 0.3 0.0 - 0.7 10e3/uL    Absolute Basophils 0.0 0.0 - 0.2 10e3/uL    Absolute Immature Granulocytes 0.1 <=0.4 10e3/uL    Absolute NRBCs 0.0 10e3/uL   Adult Type and Screen    Collection Time: 12/23/24  1:24 PM   Result Value Ref Range    ABO/RH(D) O POS     Antibody Screen Negative Negative    SPECIMEN EXPIRATION DATE [id number]    Prepare red blood cells (unit)    Collection Time: 12/23/24  1:27 PM   Result Value Ref Range    ISSUE DATE AND TIME 20241223132700     Blood Component Type Red Blood Cells     Product Code A1203A28     Unit Status Issued     Unit Number [id number]     UNIT ABO/RH O-     CODING SYSTEM [id number]     UNIT TYPE ISBT 9500    Prepare red blood cells (unit)    Collection Time: 12/23/24  1:27 PM   Result Value Ref Range    ISSUE DATE AND TIME [id number]     Blood Component Type Red Blood Cells     Product Code O2732V44     Unit Status Issued     Unit Number [id number]     UNIT ABO/RH O-      CODING SYSTEM [id number]     UNIT TYPE ISBT 9500    Prepare red blood cells (unit)    Collection Time: 24  1:27 PM   Result Value Ref Range    ISSUE DATE AND TIME [id number]     Blood Component Type Red Blood Cells     Product Code Z7879N62     Unit Status Transfused     Unit Number [id number]     UNIT ABO/RH O-     CODING SYSTEM [id number]     UNIT TYPE ISBT 9500    Prepare red blood cells (unit)    Collection Time: 24  1:27 PM   Result Value Ref Range    ISSUE DATE AND TIME [id number]     Blood Component Type Red Blood Cells     Product Code H9414Q77     Unit Status Transfused     Unit Number [id number]     UNIT ABO/RH O-     CODING SYSTEM KAUX382     UNIT TYPE ISBT 9500    Extra Red Top Tube    Collection Time: 24  1:30 PM   Result Value Ref Range    Hold Specimen JIC    Extra Green Top (Lithium Heparin) Tube    Collection Time: 24  1:30 PM   Result Value Ref Range    Hold Specimen JIC        Lab Results: personally reviewed.     IMAGING:  Results for orders placed or performed during the hospital encounter of 24   US Pelvis Complete without Transvaginal    Impression    IMPRESSION:  1.  Heterogeneous enlarged uterus with mild hyperemia of the myometrium likely related to postpartum state. No discrete mass.  2.  There is mild heterogeneity of the endometrium but no definitive sonographic evidence of retained products of conception.             Radiology Results: Personally reviewed impression/s    IMPRESSION:  29 year old  Female,  with delayed postpartum hemorrhage. At this point, appears to be hematometra that released with uterine atony afterwards, could be placental site subinvolution. Bleeding has decreased with 1 gram TXA, Pitocin IV, IM Methergine x1, IM Hemabate x1. Bedside US performed by me showed overall normal postpartum appearing endometrial stripe and formal ultrasound confirms this. Hemodynamically improved from her presentation via EMS when she was  hypotensive and pre-syncopal. She is s/p 2 units pRBCs.     RECOMMENDATIONS:  Admit to postpartum  Trend labs and vitals  Monitor for further bleeding  Discussed if continued bleeding - would recommend uterine artery embolization. Discussed could also need D&C, balloon tamponade and up to hysterectomy, which is not our goal.     Critical care time: 40 minutes bedside in ER, additional 120 minutes coordinating care.     Thank you for allowing us to participate in the care of this patient.  Please contact us with any questions/concerns.    Alethea Welelr MD     CC: Margo Courtney

## 2024-12-23 NOTE — ED TRIAGE NOTES
Pt brought in by medics-pt delivered 5 days ago and started bleeding large amts vaginally at about 1230. 911 called.  Medics came in with tx  running. Bp 83/53. Fundus massaged. No bleeding seen now.  here.

## 2024-12-23 NOTE — PROGRESS NOTES
Arrived in ED at 1400 to patient. Fundal checks ordered every 15 min. Patients fundal check at 1415 was boggy uterus. Provider notified at that time that uterus was not firm and patient stated she felt a 'warm gush'. Upon exam patient was bleeding with quarter sized clot released. Fundal massage performed. Patient fundus firmed up at 1425. Ultrasound at bedside at 1430. Second bag of Pit started at 300ml/hr. Hemabate given at 1427. Blood transfusion complete at 1430. Normal saline started at 100ml/hr. Rectal cytotec given at 1445. Fundus firm at U/2 at 1445. Patients funds rechecked at 1500 before transfer to  and remained firm at U/2. Provider notified that patient arrived to floor. Report given to oncoming nurse Amy AHN.     Anastasia Ugarte RN

## 2024-12-23 NOTE — ED NOTES
Pt going up to postpartum with tisha CASTRO Rn who has  been down here with pt and working with writer in taking care of the pt.  Since tisha has been down here most of the time she will give report to the Rn in postpartum that will be taking care of pt.

## 2024-12-23 NOTE — ED NOTES
Dr Bland down here to see pt. Pt being transfuse with 2 units of blood at 999/hr. Methergine given by ob nurses at 1350. Pitocin hung per ob rn at 1348

## 2024-12-23 NOTE — MEDICATION SCRIBE - ADMISSION MEDICATION HISTORY
Medication Scribe Admission Medication History    Admission medication history is complete. The information provided in this note is only as accurate as the sources available at the time of the update.    Information Source(s): Patient, Family member, and CareEverywhere/SureScripts via in-person    Pertinent Information:     Changes made to PTA medication list:  Added: None  Deleted: None  Changed: None    Allergies reviewed with patient and updates made in EHR: yes    Medication History Completed By: DERIK RANGEL 12/23/2024 2:49 PM    PTA Med List   Medication Sig Last Dose/Taking    acetaminophen (TYLENOL) 500 MG tablet Take 500-1,000 mg by mouth every 6 hours as needed for mild pain. 12/23/2024 Morning    levothyroxine (SYNTHROID/LEVOTHROID) 175 MCG tablet Take 1 tablet (175 mcg) by mouth daily. 12/23/2024 Morning

## 2024-12-23 NOTE — ED PROVIDER NOTES
EMERGENCY DEPARTMENT ENCOUNTER     NAME: Juan J Burgess   AGE: 29 year old female   YOB: 1995   MRN: [id number]   EVALUATION DATE & TIME: 12/23/2024  1:19 PM   PCP: Margo Courtney     Chief Complaint   Patient presents with    Vaginal Bleeding   :    FINAL IMPRESSION       1. Postpartum hemorrhage, unspecified type           ED COURSE & MEDICAL DECISION MAKING      Pertinent Labs & Imaging studies reviewed. (See chart for details)   29 year old female  presents to the Emergency Department for evaluation of vaginal bleeding 5 days postpartum.. Initial Vitals Reviewed. Initial exam notable for patient who was tachycardic, hypotensive, pale and diaphoretic, in obvious distress.  EMS was holding a towel against her perineum which were soaked in blood and she was still having profuse hemorrhage.  TXA was being administered through an IV as EMS arrived.  I had 2 large-bore IVs placed and once we got her on the monitor the first blood pressures we got were in the 80s and not improving.  Clearly, there is massive blood loss and it sounds like there were several inches of blood in the bathtub when this spontaneously happened as well.  I did immediately consult with the lab and had 2 units of uncrossed O- administered.  Her hemoglobin did ultimately come back at 9 but I suspect that it is very delayed and she is lost a significant amount of her blood volume.  Fortunately, there has been slowing of the vaginal bleeding with just a slow ooze by about 15 minutes after TXA administration and by the time I was able to get OB at bedside her blood pressure has improved after a 1 L fluid IV bolus and then switching to blood once it became available.  I have administered 2 units of packed red blood cells.  Blood pressure now is just over 100 systolic that patient is still needing to be in Trendelenburg and feeling poorly.  OB was able to manually evacuate some additional clots, and we will plan to admit to the  postpartum service.  Currently, patient has stabilized enough that they are not going to go directly to the OR but the OR is still on standby.        1:24 PM I met with the patient for an initial encounter and evaluation.  1:37 PM I spoke with OBGYN, Dr. Weller, regarding the patient's care and discussed planning to see the patient themselves.     At the conclusion of the encounter I discussed the results of all of the tests and the disposition. The questions were answered. The patient or family acknowledged understanding and was agreeable with the care plan.     80 minutes critical care time, see procedure note below for details if relevant    Medical Decision Making    History:  Supplemental history from: Documented in chart and EMS  External Record(s) reviewed: Delivery record from 12/17/2024    Work Up:  Chart documentation includes differential considered and any EKGs or imaging independently interpreted by provider, where specified.  In additional to work up documented, I considered the following work up: Documented in chart, if applicable.    External consultation:  Discussion of management with another provider: Documented in chart, if applicable and OB-Gyn    Complicating factors:  Care impacted by chronic illness: Diabetes and Other: Thyroid Disease  Care affected by social determinants of health: N/A    Disposition considerations: Admit.    Not Applicable             MEDICATIONS GIVEN IN THE EMERGENCY:   Medications - No data to display   NEW PRESCRIPTIONS STARTED AT TODAY'S ER VISIT   New Prescriptions    No medications on file     ================================================================   HISTORY OF PRESENT ILLNESS       Patient information was obtained from: EMS, patient,    Use of Intrepreter: N/A   Juan J Burgess is a 29 year old female with history of diabetes, thyroid disease, asthma, PCOS, and chronic migraines, who presents to the ED with a concern of vaginal bleeding which  "occurred prior to arrival, in her bathtub. She was trying to wash herself down but noticed heavy bleeding in her tub. She called EMS, stated to find \"inches\" of blood. EMS started TXA and finished when arriving to the ED. Noted to have low blood pressure. Patient is five days post-partum. No other medical concerns are expressed at this time.     Per chart review, patient was admitted to [facility] from 12/17/24-12/20/24, for a 38 week induction. Course unremarkable. Declined vaccinations, passed a large clot. Called OBGYN, fetal heart tones recovered after placing fetal saclp electrode. Hemoglobin 10.9.     ================================================================        PAST HISTORY     PAST MEDICAL HISTORY:   Past Medical History:   Diagnosis Date    Diabetes (H)     Gestational when pregnant with the first kid    Migraines     Palpitation     has felt some palpitations over the last 3 weeks    Thyroid disease 2017    Uncomplicated asthma     When a i was a kid      PAST SURGICAL HISTORY:   Past Surgical History:   Procedure Laterality Date    DILATION AND CURETTAGE SUCTION N/A 10/14/2022    Procedure: SUCTION DILATION AND CURETTAGE;  Surgeon: Jerrod Bliss MD;  Location: [facility] OR    WISDOM TOOTH EXTRACTION  2020?      CURRENT MEDICATIONS:   alcohol swab prep pads  blood glucose (NO BRAND SPECIFIED) test strip  Blood Glucose Monitoring Suppl (ACCU-CHEK GUIDE) w/Device KIT  levothyroxine (SYNTHROID/LEVOTHROID) 175 MCG tablet  prenatal vitamin iron-folic acid 27mg-0.8mg (PRENATAL S) 27 mg iron- 800 mcg Tab tablet  thin (NO BRAND SPECIFIED) lancets      ALLERGIES:   No Known Allergies   FAMILY HISTORY:   Family History   Problem Relation Age of Onset    Hypertension Mother     Diabetes Father     Diabetes Brother     Cancer No family hx of       SOCIAL HISTORY:   Social History     Socioeconomic History    Marital status:    Tobacco Use    Smoking status: " "Never     Passive exposure: Never    Smokeless tobacco: Never   Vaping Use    Vaping status: Never Used   Substance and Sexual Activity    Alcohol use: Not Currently    Drug use: Never    Sexual activity: Yes     Partners: Male     Birth control/protection: None   Other Topics Concern    Parent/sibling w/ CABG, MI or angioplasty before 65F 55M? No     Social Drivers of Health     Financial Resource Strain: Low Risk  (12/17/2024)    Financial Resource Strain     Within the past 12 months, have you or your family members you live with been unable to get utilities (heat, electricity) when it was really needed?: No   Food Insecurity: Low Risk  (12/17/2024)    Food Insecurity     Within the past 12 months, did you worry that your food would run out before you got money to buy more?: No     Within the past 12 months, did the food you bought just not last and you didn t have money to get more?: No   Transportation Needs: Low Risk  (12/17/2024)    Transportation Needs     Within the past 12 months, has lack of transportation kept you from medical appointments, getting your medicines, non-medical meetings or appointments, work, or from getting things that you need?: No   Interpersonal Safety: Low Risk  (12/17/2024)    Interpersonal Safety     Do you feel physically and emotionally safe where you currently live?: Yes     Within the past 12 months, have you been hit, slapped, kicked or otherwise physically hurt by someone?: No     Within the past 12 months, have you been humiliated or emotionally abused in other ways by your partner or ex-partner?: No   Housing Stability: Low Risk  (12/17/2024)    Housing Stability     Do you have housing? : Yes     Are you worried about losing your housing?: No        VITALS  Patient Vitals for the past 24 hrs:   BP Temp Temp src Pulse Resp SpO2 Height Weight   12/23/24 1325 (!) 84/51 97.6  F (36.4  C) Tympanic 93 12 100 % 1.499 m (4' 11\") 68.9 kg (152 lb)    " "    ================================================================    PHYSICAL EXAM     VITAL SIGNS: BP (!) 84/51   Pulse 93   Temp 97.6  F (36.4  C) (Tympanic)   Resp 12   Ht 1.499 m (4' 11\")   Wt 68.9 kg (152 lb)   SpO2 100%   BMI 30.70 kg/m     Constitutional:  Awake, no acute distress   HENT:  Atraumatic, oropharynx without exudate or erythema, membranes moist  Lymph:  No adenopathy  Eyes: EOM intact, PERRL, no injection  Neck: Supple  Respiratory:  Clear to auscultation bilaterally, no wheezes or crackles   Cardiovascular:  Regular rate and rhythm, single S1 and S2   GI:  Soft, nontender, nondistended, no rebound or guarding   Musculoskeletal:  Moves all extremities, no lower extremity edema, no deformities    Skin:  Warm, dry  Neurologic:  Alert and oriented x3, no focal deficits noted       ================================================================  LAB       All pertinent labs reviewed and interpreted.   Labs Ordered and Resulted from Time of ED Arrival to Time of ED Departure   CBC WITH PLATELETS AND DIFFERENTIAL - Abnormal       Result Value    WBC Count 13.2 (*)     RBC Count 3.21 (*)     Hemoglobin 9.0 (*)     Hematocrit 27.3 (*)     MCV 85      MCH 28.0      MCHC 33.0      RDW 13.1      Platelet Count 355      % Neutrophils 59      % Lymphocytes 31      % Monocytes 7      % Eosinophils 3      % Basophils 0      % Immature Granulocytes 1      NRBCs per 100 WBC 0      Absolute Neutrophils 7.7      Absolute Lymphocytes 4.1      Absolute Monocytes 0.9      Absolute Eosinophils 0.3      Absolute Basophils 0.0      Absolute Immature Granulocytes 0.1      Absolute NRBCs 0.0     INR - Normal    INR 1.02     PARTIAL THROMBOPLASTIN TIME   TYPE AND SCREEN, ADULT    SPECIMEN EXPIRATION DATE 20241226235900     PREPARE RED BLOOD CELLS (UNIT)    ISSUE DATE AND TIME 20241223132700      Blood Component Type Red Blood Cells      Product Code F1563I39      Unit Status Issued      Unit Number [id number]   "    UNIT ABO/RH O-      CODING SYSTEM [id number]      UNIT TYPE ISBT 9500     PREPARE RED BLOOD CELLS (UNIT)    ISSUE DATE AND TIME 20241223132700      Blood Component Type Red Blood Cells      Product Code E1091H31      Unit Status Issued      Unit Number [id number]      UNIT ABO/RH O-      CODING SYSTEM [id number]      UNIT TYPE ISBT 9500     PREPARE RED BLOOD CELLS (UNIT)    ISSUE DATE AND TIME 20241223132700      Blood Component Type Red Blood Cells      Product Code G5247G88      Unit Status Transfused      Unit Number [id number]      UNIT ABO/RH O-      CODING SYSTEM [id number]      UNIT TYPE ISBT 9500     PREPARE RED BLOOD CELLS (UNIT)   TRANSFUSE RED BLOOD CELLS (UNIT)   ABO/RH TYPE AND SCREEN        ===============================================================  RADIOLOGY       Reviewed all pertinent imaging. Please see official radiology report.   No orders to display         ================================================================  EKG         I have independently reviewed and interpreted the EKG(s) documented above.     ================================================================  PROCEDURES     Critical Care  Performed by: Esther Zabala MD  Authorized by: Esther Zabala MD  Total critical care time: 80 minutes  Critical care time was exclusive of separately billable procedures and treating other patients.  Critical care was necessary to treat or prevent imminent or life-threatening deterioration of the following conditions: Postpartum hemorrhage with hemorrhagic shock  Critical care was time spent personally by me on the following activities: development of treatment plan with patient or surrogate, discussions with consultants, examination of patient, evaluation of patient's response to treatment, obtaining history from patient or surrogate, ordering and performing treatments and interventions, ordering and review of laboratory studies, ordering and review of radiographic studies and  re-evaluation of patient's condition, this excludes any separately billable procedures.        I, Chadwick Zach, am serving as a scribe to document services personally performed by Dr. Zabala based on my observation and the provider's statements to me. I, Esther Zabala MD attest that Chadwick Serrano is acting in a scribe capacity, has observed my performance of the services and has documented them in accordance with my direction.   Esther Zabala M.D.   Emergency Medicine   [facility] EMERGENCY DEPARTMENT  [address] [place] [id number]  [phone]  Dept: [phone]      Esther Zabala MD  12/23/24 3041

## 2024-12-23 NOTE — ED NOTES
Misoprostol 1000mg rectal given by ob rn. 2nd bag of pitocin hug at 300cc/hr  per dr zafar. Carboprost 250mcg given IM.

## 2024-12-24 ENCOUNTER — HOSPITAL ENCOUNTER (INPATIENT)
Facility: HOSPITAL | Age: 29
End: 2024-12-24
Admitting: OBSTETRICS & GYNECOLOGY
Payer: COMMERCIAL

## 2024-12-24 VITALS
TEMPERATURE: 98.5 F | HEIGHT: 59 IN | SYSTOLIC BLOOD PRESSURE: 105 MMHG | WEIGHT: 152 LBS | HEART RATE: 67 BPM | DIASTOLIC BLOOD PRESSURE: 54 MMHG | RESPIRATION RATE: 18 BRPM | OXYGEN SATURATION: 99 % | BODY MASS INDEX: 30.64 KG/M2

## 2024-12-24 LAB
APTT PPP: 28 SECONDS (ref 22–38)
APTT PPP: 28 SECONDS (ref 22–38)
APTT PPP: 29 SECONDS (ref 22–38)
ERYTHROCYTE [DISTWIDTH] IN BLOOD BY AUTOMATED COUNT: 14.3 % (ref 10–15)
ERYTHROCYTE [DISTWIDTH] IN BLOOD BY AUTOMATED COUNT: 14.5 % (ref 10–15)
ERYTHROCYTE [DISTWIDTH] IN BLOOD BY AUTOMATED COUNT: 14.6 % (ref 10–15)
FIBRINOGEN PPP-MCNC: 304 MG/DL (ref 170–510)
FIBRINOGEN PPP-MCNC: 320 MG/DL (ref 170–510)
FIBRINOGEN PPP-MCNC: 352 MG/DL (ref 170–510)
HCT VFR BLD AUTO: 24.4 % (ref 35–47)
HCT VFR BLD AUTO: 24.6 % (ref 35–47)
HCT VFR BLD AUTO: 25.1 % (ref 35–47)
HGB BLD-MCNC: 8.1 G/DL (ref 11.7–15.7)
HGB BLD-MCNC: 8.4 G/DL (ref 11.7–15.7)
HGB BLD-MCNC: 8.5 G/DL (ref 11.7–15.7)
INR PPP: 1.02 (ref 0.85–1.15)
INR PPP: 1.08 (ref 0.85–1.15)
INR PPP: 1.08 (ref 0.85–1.15)
MCH RBC QN AUTO: 27.2 PG (ref 26.5–33)
MCH RBC QN AUTO: 27.6 PG (ref 26.5–33)
MCH RBC QN AUTO: 28.3 PG (ref 26.5–33)
MCHC RBC AUTO-ENTMCNC: 32.9 G/DL (ref 31.5–36.5)
MCHC RBC AUTO-ENTMCNC: 33.5 G/DL (ref 31.5–36.5)
MCHC RBC AUTO-ENTMCNC: 34.8 G/DL (ref 31.5–36.5)
MCV RBC AUTO: 81 FL (ref 78–100)
MCV RBC AUTO: 83 FL (ref 78–100)
MCV RBC AUTO: 83 FL (ref 78–100)
PLATELET # BLD AUTO: 217 10E3/UL (ref 150–450)
PLATELET # BLD AUTO: 219 10E3/UL (ref 150–450)
PLATELET # BLD AUTO: 220 10E3/UL (ref 150–450)
RBC # BLD AUTO: 2.98 10E6/UL (ref 3.8–5.2)
RBC # BLD AUTO: 3 10E6/UL (ref 3.8–5.2)
RBC # BLD AUTO: 3.04 10E6/UL (ref 3.8–5.2)
WBC # BLD AUTO: 11.7 10E3/UL (ref 4–11)
WBC # BLD AUTO: 12 10E3/UL (ref 4–11)
WBC # BLD AUTO: 15.4 10E3/UL (ref 4–11)

## 2024-12-24 PROCEDURE — 85384 FIBRINOGEN ACTIVITY: CPT | Performed by: OBSTETRICS & GYNECOLOGY

## 2024-12-24 PROCEDURE — 85730 THROMBOPLASTIN TIME PARTIAL: CPT | Performed by: OBSTETRICS & GYNECOLOGY

## 2024-12-24 PROCEDURE — 85610 PROTHROMBIN TIME: CPT | Performed by: OBSTETRICS & GYNECOLOGY

## 2024-12-24 PROCEDURE — 85014 HEMATOCRIT: CPT | Performed by: OBSTETRICS & GYNECOLOGY

## 2024-12-24 PROCEDURE — 36415 COLL VENOUS BLD VENIPUNCTURE: CPT | Performed by: OBSTETRICS & GYNECOLOGY

## 2024-12-24 PROCEDURE — 250N000013 HC RX MED GY IP 250 OP 250 PS 637: Performed by: OBSTETRICS & GYNECOLOGY

## 2024-12-24 RX ORDER — IBUPROFEN 800 MG/1
800 TABLET, FILM COATED ORAL EVERY 6 HOURS PRN
Qty: 40 TABLET | Refills: 0 | Status: SHIPPED | OUTPATIENT
Start: 2024-12-24

## 2024-12-24 RX ORDER — SIMETHICONE 80 MG
80 TABLET,CHEWABLE ORAL 4 TIMES DAILY PRN
Status: DISCONTINUED | OUTPATIENT
Start: 2024-12-24 | End: 2024-12-24 | Stop reason: HOSPADM

## 2024-12-24 RX ORDER — METHYLERGONOVINE MALEATE 0.2 MG/1
0.2 TABLET ORAL EVERY 6 HOURS
Qty: 16 TABLET | Refills: 0 | Status: SHIPPED | OUTPATIENT
Start: 2024-12-24 | End: 2024-12-28

## 2024-12-24 RX ADMIN — IBUPROFEN 800 MG: 800 TABLET ORAL at 06:05

## 2024-12-24 RX ADMIN — DOCUSATE SODIUM 100 MG: 100 CAPSULE, LIQUID FILLED ORAL at 09:20

## 2024-12-24 RX ADMIN — ACETAMINOPHEN 650 MG: 325 TABLET ORAL at 09:20

## 2024-12-24 ASSESSMENT — ACTIVITIES OF DAILY LIVING (ADL)
ADLS_ACUITY_SCORE: 38
ADLS_ACUITY_SCORE: 37
ADLS_ACUITY_SCORE: 38

## 2024-12-24 NOTE — PLAN OF CARE
"VSS. Patient reports feeling better. Denies SOB, chest pain, numbness, tingling, or visual changes. Did have a headache upon coming to unit, but resolved after eating. Patient does report general weakness, but again states she hasn't had much to eat today. Strength 5/5 at upper extremities and 4/5 at lower extremities. See flowsheet for complete assessment.         Problem: Adult Inpatient Plan of Care  Goal: Plan of Care Review  Description: The Plan of Care Review/Shift note should be completed every shift.  The Outcome Evaluation is a brief statement about your assessment that the patient is improving, declining, or no change.  This information will be displayed automatically on your shift  note.  Outcome: Progressing  Goal: Patient-Specific Goal (Individualized)  Description: You can add care plan individualizations to a care plan. Examples of Individualization might be:  \"Parent requests to be called daily at 9am for status\", \"I have a hard time hearing out of my right ear\", or \"Do not touch me to wake me up as it startles  me\".  Outcome: Progressing  Goal: Absence of Hospital-Acquired Illness or Injury  Outcome: Progressing  Intervention: Prevent Skin Injury  Recent Flowsheet Documentation  Taken 12/23/2024 1848 by Amy Yang RN  Body Position: position changed independently  Taken 12/23/2024 1838 by Amy Yang RN  Body Position: position changed independently  Taken 12/23/2024 1803 by Amy Yang RN  Body Position: position changed independently  Taken 12/23/2024 1747 by Amy Yang RN  Body Position: position changed independently  Taken 12/23/2024 1733 by Amy Yang RN  Body Position: position changed independently  Taken 12/23/2024 1719 by Amy Yang RN  Body Position: position changed independently  Taken 12/23/2024 1704 by Amy Yang RN  Body Position: position changed independently  Taken 12/23/2024 1649 by Amy Yang, RN  Body Position: position changed " independently  Taken 12/23/2024 1634 by Amy Yang RN  Body Position: position changed independently  Taken 12/23/2024 1619 by Amy Yang RN  Body Position: position changed independently  Taken 12/23/2024 1604 by Amy Yang RN  Body Position: position changed independently  Taken 12/23/2024 1554 by Amy Yang RN  Body Position: position changed independently  Taken 12/23/2024 1539 by Amy Yang RN  Body Position: position changed independently  Taken 12/23/2024 1524 by Amy Yang RN  Body Position: position changed independently  Intervention: Prevent and Manage VTE (Venous Thromboembolism) Risk  Recent Flowsheet Documentation  Taken 12/23/2024 1848 by Amy Yang RN  VTE Prevention/Management: SCDs on (sequential compression devices)  Taken 12/23/2024 1838 by Amy Yang RN  VTE Prevention/Management: SCDs on (sequential compression devices)  Taken 12/23/2024 1803 by Amy Yang RN  VTE Prevention/Management: SCDs on (sequential compression devices)  Taken 12/23/2024 1747 by Amy Yang RN  VTE Prevention/Management: SCDs on (sequential compression devices)  Taken 12/23/2024 1733 by Amy Yang RN  VTE Prevention/Management: SCDs on (sequential compression devices)  Taken 12/23/2024 1719 by Amy Yang RN  VTE Prevention/Management: SCDs on (sequential compression devices)  Taken 12/23/2024 1704 by Amy Yang RN  VTE Prevention/Management: SCDs on (sequential compression devices)  Taken 12/23/2024 1649 by Amy Yang RN  VTE Prevention/Management: SCDs on (sequential compression devices)  Taken 12/23/2024 1634 by Amy Yang RN  VTE Prevention/Management: SCDs on (sequential compression devices)  Taken 12/23/2024 1619 by Amy Yang RN  VTE Prevention/Management: SCDs on (sequential compression devices)  Taken 12/23/2024 1604 by Amy Yang RN  VTE Prevention/Management: SCDs on (sequential compression devices)  Taken  12/23/2024 1554 by Amy Yang RN  VTE Prevention/Management: SCDs on (sequential compression devices)  Taken 12/23/2024 1539 by Amy Yang RN  VTE Prevention/Management: SCDs on (sequential compression devices)  Taken 12/23/2024 1524 by Aym Yang RN  VTE Prevention/Management: SCDs on (sequential compression devices)  Intervention: Prevent Infection  Recent Flowsheet Documentation  Taken 12/23/2024 1803 by Amy Yang RN  Infection Prevention: rest/sleep promoted  Taken 12/23/2024 1747 by Amy Yang RN  Infection Prevention: rest/sleep promoted  Taken 12/23/2024 1733 by Amy Yang RN  Infection Prevention: rest/sleep promoted  Goal: Optimal Comfort and Wellbeing  Outcome: Progressing  Intervention: Provide Person-Centered Care  Recent Flowsheet Documentation  Taken 12/23/2024 1848 by Amy Yang RN  Trust Relationship/Rapport:   care explained   choices provided   emotional support provided   empathic listening provided   questions answered   questions encouraged   reassurance provided   thoughts/feelings acknowledged  Taken 12/23/2024 1524 by Amy Yang RN  Trust Relationship/Rapport:   care explained   choices provided   emotional support provided   empathic listening provided   questions answered   questions encouraged   reassurance provided   thoughts/feelings acknowledged  Goal: Readiness for Transition of Care  Outcome: Progressing     Problem: Postpartum (Vaginal Delivery)  Goal: Hemostasis  Outcome: Progressing  Goal: Optimal Pain Control and Function  Outcome: Progressing  Intervention: Prevent or Manage Pain  Recent Flowsheet Documentation  Taken 12/23/2024 1848 by Amy Yang RN  Complementary Therapy:   aromatherapy utilized   massage therapy performed   essential oils utilized   music therapy provided  Perineal Care:   absorbent brief/pad changed   perineum cleansed  Taken 12/23/2024 1838 by Amy Yang RN  Perineal Care:   absorbent brief/pad  changed   perineum cleansed  Taken 12/23/2024 1803 by Amy Yang RN  Perineal Care:   absorbent brief/pad changed   perineum cleansed  Taken 12/23/2024 1747 by Amy Yang RN  Perineal Care:   absorbent brief/pad changed   perineum cleansed  Taken 12/23/2024 1733 by Amy Yang RN  Perineal Care:   absorbent brief/pad changed   perineum cleansed  Taken 12/23/2024 1719 by Amy Yang RN  Complementary Therapy:   aromatherapy utilized   massage therapy performed   essential oils utilized   music therapy provided  Perineal Care:   absorbent brief/pad changed   perineum cleansed  Taken 12/23/2024 1704 by Amy Yang RN  Complementary Therapy:   aromatherapy utilized   massage therapy performed   essential oils utilized   music therapy provided  Perineal Care:   absorbent brief/pad changed   perineum cleansed  Taken 12/23/2024 1649 by Amy Yang RN  Complementary Therapy:   aromatherapy utilized   massage therapy performed   essential oils utilized   music therapy provided  Perineal Care:   absorbent brief/pad changed   perineum cleansed  Taken 12/23/2024 1634 by Amy Yang RN  Complementary Therapy:   aromatherapy utilized   massage therapy performed   essential oils utilized   music therapy provided  Perineal Care:   absorbent brief/pad changed   perineum cleansed  Taken 12/23/2024 1619 by Amy Yang RN  Complementary Therapy:   aromatherapy utilized   massage therapy performed   essential oils utilized   music therapy provided  Perineal Care:   absorbent brief/pad changed   perineum cleansed  Taken 12/23/2024 1604 by Amy Yang RN  Complementary Therapy:   aromatherapy utilized   massage therapy performed   essential oils utilized   music therapy provided  Perineal Care:   absorbent brief/pad changed   perineum cleansed  Taken 12/23/2024 1554 by Amy Yang RN  Complementary Therapy:   aromatherapy utilized   massage therapy performed   essential oils utilized    music therapy provided  Perineal Care:   absorbent brief/pad changed   perineum cleansed  Taken 12/23/2024 1539 by Amy Yang, RN  Complementary Therapy:   aromatherapy utilized   massage therapy performed   essential oils utilized   music therapy provided  Perineal Care:   absorbent brief/pad changed   perineum cleansed  Taken 12/23/2024 1524 by Amy Yang, RN  Complementary Therapy:   aromatherapy utilized   massage therapy performed   essential oils utilized   music therapy provided  Perineal Care:   absorbent brief/pad changed   perineum cleansed  Goal: Effective Urinary Elimination  Outcome: Progressing   Goal Outcome Evaluation:                      Amy Yang, RN

## 2024-12-24 NOTE — PROGRESS NOTES
Spoke with Dr. Weller and updated on patient's status. Also updated on elevated temperature and WBC. May decrease frequency of VS to Q 1 and return to routine vitals at nurses discretions.    Amy Yang RN

## 2024-12-24 NOTE — PROGRESS NOTES
Patient was bladder scanned for 226 ml and 1755 and was assisted on bedpan and voided 350 ml. Patient also passed a 36 g clot. Charge nurse was notified and updated Provider. Patient denies having any new complaints. Will continue to monitor and assess.     Amy Yang RN

## 2024-12-24 NOTE — PROVIDER NOTIFICATION
Provider notified of large clot when patient got up to void. Clot was 14ml. Labs were drawn. No further orders.    Anastasia Ugarte RN

## 2024-12-24 NOTE — PLAN OF CARE
Goal Outcome Evaluation:      Plan of Care Reviewed With: patient    Overall Patient Progress: improving    Outcome Evaluation: Patient's vital signs and OB assessments WNL. Fundus firm, 2 below umbilicus, and bleeding has been scant. Per Dr. Weller, frequency of vitals and assessments up to nurse discretion. Transitioned from Q1 vitals/fundal checks to Q4 after midnight to promote sleep. Patient ambulated to bathroom for the first time at 2300 and voided 400ml. Denies dizziness and SOB - overall feeling weak. Lab draws to continue every 6 hours to watch trends - last hgb dropped from 10.6 to 8.5. Moderate headache and cramping controlled with prn tylenol and ibuprofen. Given dermoplast for sore perineum. Patient pumped 90ml and put in nursery fridge - would like lactation to review if ok to give to baby after meds given yesterday. Otherwise will dump.  and baby at bedside.    Estela Rivas RN on 12/24/2024 at 3:36 AM

## 2024-12-24 NOTE — PROGRESS NOTES
[facility] RN Care Coordinator Note    Yudithgorge EDSON Burgess  [id number]  1995    Chart reviewed, discharge follow-up plan discussed with bedside RN, needs assessed. Patient requests all follow-up through clinic, declines ordered home care visit. [facility] Intake updated, referral order canceled. Follow-up appointment with  planned in 6 weeks at [facility].     RN Care Coordinator will continue to follow and assist if needed with discharge plan.

## 2024-12-24 NOTE — PROGRESS NOTES
"  Patient is pumping her breasts with no problems. Denies any further heavy vaginal bleeding.  Denies cramping.    /53 (BP Location: Right arm, Patient Position: Supine, Cuff Size: Adult Regular)   Pulse 80   Temp 98.2  F (36.8  C) (Oral)   Resp 18   Ht 1.499 m (4' 11\")   Wt 68.9 kg (152 lb)   SpO2 97%   BMI 30.70 kg/m    Patient in NAD, alert and O x 3    Abdomen: soft, non tender, BS normal.  Extremities: no edema or tenderness.    ASSESSMENT:  PPD# 6 Readmitted for postpartum vaginal bleeding, most likely related to uterine atony.  Discussed  prevention of atony, will send her home on methergine.    PLAN:  Discharge to home  Follow up in clinic in one week.    Lesli Thomas MD on 12/24/2024 at 8:02 AM    "

## 2024-12-24 NOTE — PROGRESS NOTES
"VSS. Fundus firm, midline at U/2. Patent reports feeling \"much better\" since voiding and passing 36 g clot. Denies having any complaints. See flow sheet for assessment. Attempted to call Dr. Weller to update on patient's status and to reports labs; including WBC trending up from 13.2 to 23.5. Norberto also has a current temperature of 99.7. Denies complaints. Charge nurse updated.   Report given to Estela AHN and cares transferred.     Amy Yang RN      "

## 2024-12-24 NOTE — PLAN OF CARE
Goal Outcome Evaluation:         Problem: Adult Inpatient Plan of Care  Goal: Optimal Comfort and Wellbeing  Intervention: Monitor Pain and Promote Comfort  Recent Flowsheet Documentation  Taken 12/24/2024 0920 by Drea Jessica, RN  Pain Management Interventions: medication (see MAR)   Pain controlled with Tylenol/Ibuprofen as needed. Up moving independently.     PPH resolved, stable denies any bleeding or clots.  Stable for discharge today.    Breast milk pumped returned (from fridge) to pt before discharge.    Drea Jessica, RN

## 2024-12-24 NOTE — PROGRESS NOTES
D:  Patient desires discharge home.  Discharge orders received and entered by provider.  A:  Discharge instructions reviewed with the patient.  All questions and concerns addressed.  R:  Discharge criteria met.  4 Part ID bands double checked.  [place] discharged in car seat with parents.  The nursing assistant escorted patient to car .      Drea Jessica RN

## 2024-12-24 NOTE — OR NURSING
29 y.o.patient admitted from the ED to room 22 with a diagnosis of PPH at 1624. Patient is status post vaginal delivery on 12/18/24. Patient is A & O X4 and is able to make needs known. Introduction done. Report obtained from Anastasia AHN. Patient is accompanied by significant other. Patient and significant other was oriented to call light, order and unit routine. States goal is reduction in bleeding and to discharge home. Provided teaching on modalities to optimize healing and prevent complications related to hospitalization. Upon initial assessment, fundus is firm, midline at U/2. Lochia is light, with no clots noted. See flow sheet for cephalocaudal assessment.   Will continue to monitor, assess, meet needs and provide holistic care.     Amy Yang RN

## 2024-12-24 NOTE — PROGRESS NOTES
Report received from Amy Yang RN. Dual fundal check at 1930.    Estela Rivas RN on 12/23/2024 at 7:51 PM

## 2024-12-24 NOTE — DISCHARGE INSTRUCTIONS
Warning Signs after Having a Baby    Keep this paper on your fridge or somewhere else where you can see it.    Call your provider if you have any of these symptoms up to 12 weeks after having your baby.    Thoughts of hurting yourself or your baby  Pain in your chest or trouble breathing  Severe headache not helped by pain medicine  Eyesight concerns (blurry vision, seeing spots or flashes of light, other changes to eyesight)  Fainting, shaking or other signs of a seizure    Call 9-1-1 if you feel that it is an emergency.     The symptoms below can happen to anyone after giving birth. They can be very serious. Call your provider if you have any of these warning signs.    My provider s phone number: _______________________    Losing too much blood (hemorrhage)    Call your provider if you soak through a pad in less than an hour or pass blood clots bigger than a golf ball. These may be signs that you are bleeding too much.    Blood clots in the legs or lungs    After you give birth, your body naturally clots its blood to help prevent blood loss. Sometimes this increased clotting can happen in other areas of the body, like the legs or lungs. This can block your blood flow and be very dangerous.     Call your provider if you:  Have a red, swollen spot on the back of your leg that is warm or painful when you touch it.   Are coughing up blood.     Infection    Call your provider if you have any of these symptoms:  Fever of 100.4 F (38 C) or higher.  Pain or redness around your stitches if you had an incision.   Any yellow, white, or green fluid coming from places where you had stitches or surgery.    Mood Problems (postpartum depression)    Many people feel sad or have mood changes after having a baby. But for some people, these mood swings are worse.     Call your provider right away if you feel so anxious or nervous that you can't care for yourself or your baby.    Preeclampsia (high blood pressure)    Even if you  "didn't have high blood pressure when you were pregnant, you are at risk for the high blood pressure disease called preeclampsia. This risk can last up to 12 weeks after giving birth.     Call your provider if you have:   Pain on your right side under your rib cage  Sudden swelling in the hands and face    Remember: You know your body. If something doesn't feel right, get medical help.     For informational purposes only. Not to replace the advice of your health care provider. Copyright 2020 [facility]. All rights reserved. Clinically reviewed by Doris Squires, RNC-OB, MSN. [facility] 985791 - Rev .  Postpartum Care at Home With Your Baby: Care Instructions  Overview     After childbirth (postpartum period), your body goes through many changes as you recover. In these weeks after delivery, try to take good care of yourself. Get rest whenever you can and accept help from others.  It may take 4 to 6 weeks to feel like yourself again, and possibly longer if you had a  birth. You may feel sore or very tired as you recover. After delivery, you may continue to have contractions as the uterus returns to the size it was before your pregnancy. You will also have some vaginal bleeding. And you may have pain around the vagina as you heal. Several days after delivery you may also have pain and swelling in your breasts as they fill with milk. There are things you can do at home to help ease these discomforts.  After childbirth, it's common to feel emotional. You may feel irritable, cry easily, and feel happy one minute and sad the next. This is called the \"baby blues.\" Hormone changes are one cause of these emotional changes. These feelings usually get better within a couple of weeks. If they don't, talk to your doctor or midwife.  In the first couple of weeks after you give birth, your doctor or midwife may want to check in with you and make a plan for follow-up care. You will likely have a complete " postpartum visit in the first 3 months after delivery. At that time, your doctor or midwife will check on your recovery and see how you're doing. But if you have questions or concerns before then, you can always call your doctor or midwife.  Follow-up care is a key part of your treatment and safety. Be sure to make and go to all appointments, and call your doctor if you are having problems. It's also a good idea to know your test results and keep a list of the medicines you take.  How can you care for yourself at home?  Taking care of your body  Use pads instead of tampons for bleeding. After birth, you will have bloody vaginal discharge. You may also pass some blood clots that shouldn't be bigger than an egg. Over the next 6 weeks or so, your bleeding should decrease a little every day and slowly change to a pinkish and then whitish discharge.  For cramps or mild pain, try an over-the-counter pain medicine, such as acetaminophen (Tylenol) or ibuprofen (Advil, Motrin). Read and follow all instructions on the label.  To ease pain around the vagina or from hemorrhoids:  Put ice or a cold pack on the area for 10 to 20 minutes at a time. Put a thin cloth between the ice and your skin.  Try sitting in a few inches of warm water (sitz bath) when you can or after bowel movements.  Clean yourself with a gentle squeeze of warm water from a bottle instead of wiping with toilet paper.  Use witch hazel or hemorrhoid pads (such as Tucks).  Try using a cold compress for sore and swollen breasts. And wear a supportive bra that fits.  Ease constipation by drinking plenty of fluids and eating high-fiber foods. Ask your doctor or midwife about over-the-counter stool softeners.  Activity  Rest when you can.  Ask for help from family or friends when you need it.  If you can, have another adult in your home for at least 2 or 3 days after birth.  When you feel ready, try to get some exercise every day. For many people, walking is a good  choice. Don't do any heavy exercise until your doctor or midwife says it's okay.  Ask your doctor or midwife when it is okay to have vaginal sex.  If you don't want to get pregnant, talk to your doctor or midwife about birth control options. You can get pregnant even before your period returns. Also, you can get pregnant while you are breastfeeding.  Talk to your doctor or midwife if you want to get pregnant again. They can talk to you about when it is safe.  Emotional health  It's normal to have some sadness, anxiety, and mood swings after delivery. It may help to talk with a trusted friend or family member. You can also call the Maternal Mental Health Hotline at 6-518-[facility]OB[facility] (1-388.826.4388) for support. If these mood changes last more than a couple of weeks, talk to your doctor or midwife.  When should you call for help?  Share this information with your partner, family, or a friend. They can help you watch for warning signs.  Call 911  anytime you think you may need emergency care. For example, call if:    You feel you cannot stop from hurting yourself, your baby, or someone else.     You passed out (lost consciousness).     You have chest pain, are short of breath, or cough up blood.     You have a seizure.   Where to get help 24 hours a day, 7 days a week   If you or someone you know talks about suicide, self-harm, a mental health crisis, a substance use crisis, or any other kind of emotional distress, get help right away. You can:    Call the Suicide and Crisis Lifeline at 158.     Call 8-321-838-TALK (1-337.207.5360).     Text HOME to 870253 to access the Crisis Text Line.   Consider saving these numbers in your phone.  Go to [email or web address] for more information or to chat online.  Call your doctor or midwife now or seek immediate medical care if:    You have signs of hemorrhage (too much bleeding), such as:  Heavy vaginal bleeding. This means that you are soaking through one or more pads in an hour. Or  "you pass blood clots bigger than an egg.  Feeling dizzy or lightheaded, or you feel like you may faint.  Feeling so tired or weak that you cannot do your usual activities.  A fast or irregular heartbeat.  New or worse belly pain.     You have signs of infection, such as:  A fever.  Increased pain, swelling, warmth, or redness from an incision or wound.  Frequent or painful urination or blood in your urine.  Vaginal discharge that smells bad.  New or worse belly pain.     You have symptoms of a blood clot in your leg (called a deep vein thrombosis), such as:  Pain in the calf, back of the knee, thigh, or groin.  Swelling in the leg or groin.  A color change on the leg or groin. The skin may be reddish or purplish, depending on your usual skin color.     You have signs of preeclampsia, such as:  Sudden swelling of your face, hands, or feet.  New vision problems (such as dimness, blurring, or seeing spots).  A severe headache.     You have signs of heart failure, such as:  New or increased shortness of breath.  New or worse swelling in your legs, ankles, or feet.  Sudden weight gain, such as more than 2 to 3 pounds in a day or 5 pounds in a week.  Feeling so tired or weak that you cannot do your usual activities.     You had spinal or epidural pain relief and have:  New or worse back pain.  Increased pain, swelling, warmth, or redness at the injection site.  Tingling, weakness, or numbness in your legs or groin.   Watch closely for changes in your health, and be sure to contact your doctor or midwife if:    Your vaginal bleeding isn't decreasing.     You feel sad, anxious, or hopeless for more than a few days.     You are having problems with your breasts or breastfeeding.   Where can you learn more?  Go to https://www.healthwise.net/patiented  Enter Z768 in the search box to learn more about \"Postpartum Care at Home With Your Baby: Care Instructions.\"  Current as of: April 30, 2024  Content Version: 14.3    2024 " [employer].   Care instructions adapted under license by your healthcare professional. If you have questions about a medical condition or this instruction, always ask your healthcare professional. [employer] disclaims any warranty or liability for your use of this information.

## 2024-12-24 NOTE — DISCHARGE SUMMARY
Discharge Summary    Juan J Burgess MRN# [id number]   Age: 29 year old YOB: 1995     Date of Admission:  12/23/2024  Date of Discharge::  12/24/2024   Admitting Physician:  Alethea Weller MD  Discharge Physician:  Lesli Thomas MD     Home clinic: [facility]          Admission Diagnoses:   Postpartum hemorrhage, unspecified type [O72.1]  Intrauterine pregnancy at 38w2d          Discharge Diagnosis:   Same   S/p vaginal delivery          Procedures:     Procedure(s): Blood transfusions              Medications Prior to Admission:     Medications Prior to Admission   Medication Sig Dispense Refill Last Dose/Taking    acetaminophen (TYLENOL) 500 MG tablet Take 500-1,000 mg by mouth every 6 hours as needed for mild pain.   12/23/2024 Morning    levothyroxine (SYNTHROID/LEVOTHROID) 175 MCG tablet Take 1 tablet (175 mcg) by mouth daily. 90 tablet 1 12/23/2024 Morning    alcohol swab prep pads Use to swab area of injection/thea as directed. 100 each 3     blood glucose (NO BRAND SPECIFIED) test strip Use to test blood sugar 4 times daily or as directed. To accompany: Blood Glucose Monitor Brands: per insurance. 100 strip 6              Discharge Medications:        Review of your medicines        START taking        Dose / Directions   ibuprofen 800 MG tablet  Commonly known as: ADVIL/MOTRIN      Dose: 800 mg  Take 1 tablet (800 mg) by mouth every 6 hours as needed for other (cramping).  Quantity: 40 tablet  Refills: 0     methylergonovine 0.2 MG tablet  Commonly known as: METHERGINE      Dose: 200 mcg  Take 1 tablet (200 mcg) by mouth every 6 hours for 4 days.  Quantity: 16 tablet  Refills: 0            CONTINUE these medicines which have NOT CHANGED        Dose / Directions   acetaminophen 500 MG tablet  Commonly known as: TYLENOL      Dose: 500-1,000 mg  Take 500-1,000 mg by mouth every 6 hours as needed for mild pain.  Refills: 0     alcohol swab prep  pads  Used for: Gestational diabetes mellitus (GDM) in second trimester, gestational diabetes method of control unspecified      Use to swab area of injection/thea as directed.  Quantity: 100 each  Refills: 3     blood glucose test strip  Commonly known as: NO BRAND SPECIFIED  Used for: Gestational diabetes mellitus (GDM), antepartum, gestational diabetes method of control unspecified      Use to test blood sugar 4 times daily or as directed. To accompany: Blood Glucose Monitor Brands: per insurance.  Quantity: 100 strip  Refills: 6     levothyroxine 175 MCG tablet  Commonly known as: SYNTHROID/LEVOTHROID  Used for: Acquired hypothyroidism      Dose: 175 mcg  Take 1 tablet (175 mcg) by mouth daily.  Quantity: 90 tablet  Refills: 1               Where to get your medicines        These medications were sent to [id number] - [place] [id number] AT [facility] [address]  [address] [address]      Phone: [phone]   ibuprofen 800 MG tablet  methylergonovine 0.2 MG tablet               Consultations:   No consultations were requested during this admission          Brief History of Labor:   Juan J Burgess is a 29 year old  who presented for postpartum hemorrhage treatment.  She received 2 units PRBC and uterotonics and the bleeding improved.           Hospital Course:   The patient's hospital course was unremarkable.  On discharge, her pain was well controlled. Vaginal bleeding is similar to peak menstrual flow.  Voiding without difficulty.  Ambulating well and tolerating a normal diet.  Breastfeeding.  Infant is stable. She was discharged on post-partum day #6.    Post-partum hemoglobin:   Hemoglobin   Date Value Ref Range Status   2024 8.1 (L) 11.7 - 15.7 g/dL Final         Day of discharge assessment:   /53 (BP Location: Right arm, Patient Position: Supine, Cuff Size: Adult Regular)   Pulse 80   Temp 98.2  F (36.8  C) (Oral)   " Resp 18   Ht 1.499 m (4' 11\")   Wt 68.9 kg (152 lb)   SpO2 97%   BMI 30.70 kg/m    Abdomen: Soft, fundus firm .        Discharge Instructions and Follow-Up:     Discharge diet: Regular   Discharge activity: Activity as tolerated, pelvic rest for 6 weeks   Discharge follow-up: Follow up with Dr. Courtney in 1 week   Wound care: Sitz baths           Discharge Disposition:     Discharged to home       Lesli Thomas MD      "

## 2024-12-26 ENCOUNTER — PATIENT OUTREACH (OUTPATIENT)
Dept: CARE COORDINATION | Facility: CLINIC | Age: 29
End: 2024-12-26
Payer: COMMERCIAL

## 2024-12-26 ENCOUNTER — NURSE TRIAGE (OUTPATIENT)
Dept: FAMILY MEDICINE | Facility: CLINIC | Age: 29
End: 2024-12-26
Payer: COMMERCIAL

## 2024-12-26 NOTE — TELEPHONE ENCOUNTER
Nurse Triage SBAR    Is this a 2nd Level Triage? YES, LICENSED PRACTITIONER REVIEW IS REQUIRED    Situation: difficulty breathing    Background: States she also has an upcoming blood transfusion on 12/30 and was recently seen in the ER for postpartum hemorrhage.    Assessment: States she sometimes has to cough to catch her breath. States the breathing difficulty is worse when she is lying down as well. When she is sitting up the breathing difficulty is intermittent. States she also has some pain in her ribs that travels into her back and up her neck giving her a headache. She does have an appointment for a blood transfusion on 12/30.     Protocol Recommended Disposition:   Go To ED/UCC Now (Or To Office With PCP Approval)    Recommendation: she also states her daughter has an appointment with Dr Courtney on  12/30 and wonders if she should wait until then and see if Dr Courtney would see her as well during that visit. Writer advised she may have to go back to the ER and Dr Courtney may not want her to wait until Monday.    Routed to provider    Does the patient meet one of the following criteria for ADS visit consideration? 16+ years old, with an [facility] PCP     TIP  Providers, please consider if this condition is appropriate for management at one of our Acute and Diagnostic Services sites.     If patient is a good candidate, please use dotphrase <dot>triageresponse and select Refer to ADS to document.      Have to cough to catch up on breathing, headache, rib pain going into back, Monday has transfusion, comes and goes, worse laying on back, cough, daughter has appt,   Reason for Disposition   Pregnant or postpartum (from 0 to 6 weeks after delivery)    Additional Information   Negative: Periods where breathing stops and then resumes normally and bedridden (e.g., nursing home patient, CVA)   Negative: Fever > 101 F (38.3 C) and over 60 years of age   Negative: Fever > 100 F (37.8 C) and bedridden (e.g., nursing home  "patient, stroke, chronic illness, recovering from surgery)   Negative: Fever > 100 F (37.8 C) and diabetes mellitus or weak immune system (e.g., HIV positive, cancer chemo, splenectomy, organ transplant, chronic steroids)   Negative: MODERATE difficulty breathing (e.g., speaks in phrases, SOB even at rest, pulse 100-120) of new-onset or worse than normal   Negative: Oxygen level (e.g., pulse oximetry) 90% or lower   Negative: Wheezing can be heard across the room   Negative: Drooling or spitting out saliva (because can't swallow)   Negative: Any history of prior \"blood clot\" in leg or lungs   Negative: Illness requiring prolonged bedrest in past month (e.g., immobilization, long hospital stay)   Negative: Hip or leg fracture (broken bone) in past month (or had cast on leg or ankle in past month)   Negative: Major surgery in the past month   Negative: Long-distance travel in past month (e.g., car, bus, train, plane; with trip lasting 6 or more hours)   Negative: Cancer treatment in past six months (or has cancer now)   Negative: Extra heartbeats, irregular heart beating, or heart is beating very fast (i.e., 'palpitations')   Negative: Fever > 103 F (39.4 C)   Negative: SEVERE difficulty breathing (e.g., struggling for each breath, speaks in single words, pulse > 120)   Negative: Breathing stopped and hasn't returned   Negative: Choking on something   Negative: Bluish (or gray) lips or face   Negative: Difficult to awaken or acting confused (e.g., disoriented, slurred speech)   Negative: Passed out (e.g., fainted, lost consciousness, blacked out and was not responding)   Negative: Wheezing started suddenly after medicine, an allergic food, or bee sting   Negative: Stridor (harsh sound while breathing in)   Negative: Slow, shallow and weak breathing   Negative: Sounds like a life-threatening emergency to the triager   Negative: Chest pain   Negative: Wheezing (high pitched whistling sound) and previous asthma attacks " or use of asthma medicines   Negative: Breathing difficulty and within 14 days of COVID-19 EXPOSURE (close contact) with someone diagnosed with COVID-19 (e.g., COVID test positive)   Negative: Breathing difficulty and COVID-19 is widespread in the community   Negative: Breathing diffculty and only present when coughing   Negative: Breathing difficulty and only from stuffy nose   Negative: Breathing diffculty and only from stuffy nose or runny nose from common cold    Protocols used: Breathing Difficulty-A-[place]

## 2024-12-26 NOTE — PROGRESS NOTES
"  [facility]: Transitions of Care Outreach  Chief Complaint   Patient presents with    Clinic Care Coordination - Post Hospital       Most Recent Admission Date: 12/23/2024   Most Recent Admission Diagnosis: Postpartum hemorrhage, unspecified type - O72.1     Most Recent Discharge Date: 12/24/2024   Most Recent Discharge Diagnosis: Postpartum hemorrhage, unspecified type - O72.1     Transitions of Care Assessment    Discharge Assessment  How are you doing now that you are home?: \" I am doing ok, just having headache on and off \" (patient will call clinic)  How are your symptoms? (Red Flag symptoms escalate to triage hotline per guidelines): Improved, Other  Do you know how to contact your clinic care team if you have future questions or changes to your health status? : Yes  Does the patient have their discharge instructions? : Yes  Does the patient have questions regarding their discharge instructions? : No  Were you started on any new medications or were there changes to any of your previous medications? : Yes  Does the patient have all of their medications?: Yes  Do you have questions regarding any of your medications? : No  Do you have all of your needed medical supplies or equipment (DME)?  (i.e. oxygen tank, CPAP, cane, etc.): Yes    Post-op ([facility] Only)  If the patient had a surgery or procedure, do they have any questions for a nurse?: No         [facility] Explained and offered Care Coordination support to eligible patients: Yes    Patient accepted? No    Follow up Plan     Discharge Follow-Up  Discharge follow up appointment scheduled in alignment with recommended follow up timeframe or Transitions of Risk Category? (Low = within 30 days; Moderate= within 14 days; High= within 7 days): Yes  Discharge Follow Up Appointment Date: 01/02/25  Discharge Follow Up Appointment Scheduled with?: Specialty Care Provider    Future Appointments   Date Time Provider Department Center   1/2/2025 10:30 AM " [facility]   2/3/2025  9:20 AM [facility]       Outpatient Plan as outlined on AVS reviewed with patient.    For any urgent concerns, please contact our 24 hour nurse triage line: [phone] ([phone])       JENISE Cruz

## 2024-12-26 NOTE — TELEPHONE ENCOUNTER
Yes, have her spouse bring her to the ER or if she is having severe shortness of breath should call 911.

## 2024-12-30 ENCOUNTER — LAB (OUTPATIENT)
Dept: LAB | Facility: CLINIC | Age: 29
End: 2024-12-30
Payer: COMMERCIAL

## 2024-12-30 DIAGNOSIS — E03.9 ACQUIRED HYPOTHYROIDISM: ICD-10-CM

## 2024-12-30 DIAGNOSIS — E03.9 ACQUIRED HYPOTHYROIDISM: Primary | ICD-10-CM

## 2024-12-30 DIAGNOSIS — O24.419 GESTATIONAL DIABETES MELLITUS (GDM) IN SECOND TRIMESTER, GESTATIONAL DIABETES METHOD OF CONTROL UNSPECIFIED: ICD-10-CM

## 2024-12-30 DIAGNOSIS — E55.9 VITAMIN D DEFICIENCY: Primary | ICD-10-CM

## 2024-12-30 LAB
EST. AVERAGE GLUCOSE BLD GHB EST-MCNC: 114 MG/DL
HBA1C MFR BLD: 5.6 % (ref 0–5.6)
HGB BLD-MCNC: 9.5 G/DL (ref 11.7–15.7)
TSH SERPL DL<=0.005 MIU/L-ACNC: 1.42 UIU/ML (ref 0.3–4.2)

## 2024-12-30 PROCEDURE — 83036 HEMOGLOBIN GLYCOSYLATED A1C: CPT

## 2024-12-30 PROCEDURE — 85018 HEMOGLOBIN: CPT

## 2024-12-30 PROCEDURE — 84443 ASSAY THYROID STIM HORMONE: CPT

## 2024-12-30 PROCEDURE — 36415 COLL VENOUS BLD VENIPUNCTURE: CPT

## 2025-01-02 ENCOUNTER — MYC MEDICAL ADVICE (OUTPATIENT)
Dept: FAMILY MEDICINE | Facility: CLINIC | Age: 30
End: 2025-01-02

## 2025-01-02 DIAGNOSIS — K59.00 CONSTIPATION, UNSPECIFIED CONSTIPATION TYPE: ICD-10-CM

## 2025-01-02 DIAGNOSIS — D64.9 ANEMIA, UNSPECIFIED TYPE: Primary | ICD-10-CM

## 2025-01-02 RX ORDER — FERROUS SULFATE 325(65) MG
325 TABLET ORAL
Qty: 90 TABLET | Refills: 1 | Status: SHIPPED | OUTPATIENT
Start: 2025-01-02

## 2025-01-02 RX ORDER — DOCUSATE SODIUM 100 MG/1
100 CAPSULE, LIQUID FILLED ORAL 2 TIMES DAILY PRN
Qty: 90 CAPSULE | Refills: 3 | Status: SHIPPED | OUTPATIENT
Start: 2025-01-02

## 2025-01-13 ENCOUNTER — MEDICAL CORRESPONDENCE (OUTPATIENT)
Dept: HEALTH INFORMATION MANAGEMENT | Facility: CLINIC | Age: 30
End: 2025-01-13
Payer: COMMERCIAL

## 2025-01-27 ENCOUNTER — MEDICAL CORRESPONDENCE (OUTPATIENT)
Dept: HEALTH INFORMATION MANAGEMENT | Facility: CLINIC | Age: 30
End: 2025-01-27
Payer: COMMERCIAL

## 2025-02-03 ENCOUNTER — OFFICE VISIT (OUTPATIENT)
Dept: FAMILY MEDICINE | Facility: CLINIC | Age: 30
End: 2025-02-03
Payer: COMMERCIAL

## 2025-02-03 VITALS
TEMPERATURE: 98.4 F | HEART RATE: 89 BPM | WEIGHT: 147.6 LBS | SYSTOLIC BLOOD PRESSURE: 112 MMHG | RESPIRATION RATE: 16 BRPM | OXYGEN SATURATION: 98 % | BODY MASS INDEX: 29.76 KG/M2 | HEIGHT: 59 IN | DIASTOLIC BLOOD PRESSURE: 65 MMHG

## 2025-02-03 DIAGNOSIS — E55.9 VITAMIN D DEFICIENCY: ICD-10-CM

## 2025-02-03 DIAGNOSIS — D64.9 ANEMIA, UNSPECIFIED TYPE: ICD-10-CM

## 2025-02-03 DIAGNOSIS — Z86.32 HISTORY OF GESTATIONAL DIABETES MELLITUS: ICD-10-CM

## 2025-02-03 DIAGNOSIS — E03.9 ACQUIRED HYPOTHYROIDISM: ICD-10-CM

## 2025-02-03 LAB
EST. AVERAGE GLUCOSE BLD GHB EST-MCNC: 103 MG/DL
HBA1C MFR BLD: 5.2 % (ref 0–5.6)
HGB BLD-MCNC: 12.1 G/DL (ref 11.7–15.7)
T4 FREE SERPL-MCNC: 1.39 NG/DL (ref 0.9–1.7)
TSH SERPL DL<=0.005 MIU/L-ACNC: 0.05 UIU/ML (ref 0.3–4.2)
VIT D+METAB SERPL-MCNC: 39 NG/ML (ref 20–50)

## 2025-02-03 PROCEDURE — 84439 ASSAY OF FREE THYROXINE: CPT | Performed by: FAMILY MEDICINE

## 2025-02-03 PROCEDURE — 82306 VITAMIN D 25 HYDROXY: CPT | Performed by: FAMILY MEDICINE

## 2025-02-03 PROCEDURE — 36415 COLL VENOUS BLD VENIPUNCTURE: CPT | Performed by: FAMILY MEDICINE

## 2025-02-03 PROCEDURE — 85018 HEMOGLOBIN: CPT | Performed by: FAMILY MEDICINE

## 2025-02-03 PROCEDURE — 83036 HEMOGLOBIN GLYCOSYLATED A1C: CPT | Performed by: FAMILY MEDICINE

## 2025-02-03 PROCEDURE — 84443 ASSAY THYROID STIM HORMONE: CPT | Performed by: FAMILY MEDICINE

## 2025-02-03 NOTE — PATIENT INSTRUCTIONS
Today we will check a vitamin D, hemoglobin, A1c and thyroid function.    If hemoglobin looks good you can go off the iron supplement but stay on the prenatal vitamin daily as long as you are giving breastmilk.    For Rebecca:  Prescribed Famotidine or Pepcid to take at bedtime for her reflux.    Under 2 months of age babies usually will poop at least 1-2 times a day and if they have not or seems uncomfortable then you can give an ounce of water daily.  You could also use a little tiny piece of a glycerin suppository.    When she is 2 months of age then pooping anywhere from 5 a day to 5 a week is normal but most babies do not go past a day or 2 without a bowel movement.

## 2025-02-03 NOTE — PROGRESS NOTES
ICD-10-CM    1. Routine postpartum follow-up  Z39.2 HPV and Gynecologic Cytology Panel - Recommended Age 30-65 Years      2. Anemia, unspecified type  D64.9 Hemoglobin      3. Vitamin D deficiency  E55.9 Vitamin D Deficiency      4. Acquired hypothyroidism  E03.9 TSH with free T4 reflex      5. History of gestational diabetes mellitus  Z86.32 Hemoglobin A1c        Today we will check a vitamin D, hemoglobin, A1c and thyroid function.    If hemoglobin looks good you can go off the iron supplement but stay on the prenatal vitamin daily as long as you are giving breastmilk.    For Rebecca  (her ):  Prescribed Famotidine or Pepcid to take at bedtime for her reflux.    Under 2 months of age babies usually will poop at least 1-2 times a day and if they have not or seems uncomfortable then you can give an ounce of water daily.  You could also use a little tiny piece of a glycerin suppository.    When she is 2 months of age then pooping anywhere from 5 a day to 5 a week is normal but most babies do not go past a day or 2 without a bowel movement.     The longitudinal plan of care for the diagnosis(es)/condition(s) as documented were addressed during this visit. Due to the added complexity in care, I will continue to support Juan J in the subsequent management and with ongoing continuity of care.  Managing postpartum care, hypothyroidism and history of gestational diabetes.    Subjective:  Juan J is here for a 6-week postpartum checkup.    Delivery date was 24. She had a  of a viable girl, weight 5 pounds 10 oz., had induction for growth restriction, also had gestational diabetes diet-controlled, hypothyroid, obesity, unfortunately had a delayed postpartum hemorrhage on 2024 was 5 days after the vaginal delivery.  He had severe hemorrhaging and had to call an ambulance.   Hemoglobin went down to 8.1.  She had severe hypotension.  She did get uterotonic's.  She did need a blood transfusion of 2 units  in the emergency room.  She was admitted to the hospital.  She did not need a D&C.  She is currently on a prenatal vitamin and iron daily.  Since delivery, she has been breast feeding.  She has No signs of infection, bleeding or other complications.  She is not pregnant.  We discussed contraceptions and she has chosen none.  She  has not had intercourse since delivery and complains of No discomfort.    EXAM:  ABDOMEN: soft, nontender, without hepatosplenomegaly or masses  BREAST: normal without masses, tenderness or nipple discharge and no palpable axillary masses or adenopathy  PELVIC: external genitalia normal, cervix clean, vagina clean and well supported, fundus involuted, anterior normal, adenxa without masses or tenderness, rectal vaginal exam normal    ASSESSMENT:   Normal postpartum exam after .    PLAN:  Return as needed or at time of next expected pap, pelvic, or breast exam.      Answers submitted by the patient for this visit:  General Questionnaire (Submitted on 2/3/2025)  Chief Complaint: Chronic problems general questions HPI Form  What is the reason for your visit today? : 6 weeks postpartum  How many servings of fruits and vegetables do you eat daily?: 0-1  On average, how many sweetened beverages do you drink each day (Examples: soda, juice, sweet tea, etc.  Do NOT count diet or artificially sweetened beverages)?: 1  How many minutes a day do you exercise enough to make your heart beat faster?: 9 or less  How many days a week do you exercise enough to make your heart beat faster?: 3 or less  How many days per week do you miss taking your medication?: 1  What makes it hard for you to take your medication every day?: remembering to take  Questionnaire about: Chronic problems general questions HPI Form (Submitted on 2/3/2025)  Chief Complaint: Chronic problems general questions HPI Form

## 2025-02-04 DIAGNOSIS — E03.9 ACQUIRED HYPOTHYROIDISM: Primary | ICD-10-CM

## 2025-02-04 LAB
HPV HR 12 DNA CVX QL NAA+PROBE: NEGATIVE
HPV16 DNA CVX QL NAA+PROBE: NEGATIVE
HPV18 DNA CVX QL NAA+PROBE: NEGATIVE
HUMAN PAPILLOMA VIRUS FINAL DIAGNOSIS: NORMAL

## 2025-02-04 RX ORDER — LEVOTHYROXINE SODIUM 150 UG/1
150 TABLET ORAL
Qty: 90 TABLET | Refills: 3 | Status: SHIPPED | OUTPATIENT
Start: 2025-02-04

## 2025-02-06 LAB
BKR AP ASSOCIATED HPV REPORT: NORMAL
BKR LAB AP GYN ADEQUACY: NORMAL
BKR LAB AP GYN INTERPRETATION: NORMAL
BKR LAB AP PREVIOUS ABNORMAL: NORMAL
PATH REPORT.COMMENTS IMP SPEC: NORMAL
PATH REPORT.COMMENTS IMP SPEC: NORMAL
PATH REPORT.RELEVANT HX SPEC: NORMAL

## 2025-04-05 ENCOUNTER — LAB (OUTPATIENT)
Dept: LAB | Facility: CLINIC | Age: 30
End: 2025-04-05
Payer: COMMERCIAL

## 2025-04-05 DIAGNOSIS — E03.9 ACQUIRED HYPOTHYROIDISM: ICD-10-CM

## 2025-04-05 LAB
T4 FREE SERPL-MCNC: 1.17 NG/DL (ref 0.9–1.7)
TSH SERPL DL<=0.005 MIU/L-ACNC: 0.08 UIU/ML (ref 0.3–4.2)

## 2025-04-05 PROCEDURE — 36415 COLL VENOUS BLD VENIPUNCTURE: CPT

## 2025-04-05 PROCEDURE — 84443 ASSAY THYROID STIM HORMONE: CPT

## 2025-04-05 PROCEDURE — 84439 ASSAY OF FREE THYROXINE: CPT

## 2025-04-06 DIAGNOSIS — E03.9 ACQUIRED HYPOTHYROIDISM: Primary | ICD-10-CM

## 2025-04-06 RX ORDER — LEVOTHYROXINE SODIUM 125 UG/1
125 TABLET ORAL
Qty: 90 TABLET | Refills: 0 | Status: SHIPPED | OUTPATIENT
Start: 2025-04-06

## 2025-07-23 ENCOUNTER — MEDICAL CORRESPONDENCE (OUTPATIENT)
Dept: HEALTH INFORMATION MANAGEMENT | Facility: CLINIC | Age: 30
End: 2025-07-23
Payer: COMMERCIAL

## 2025-08-14 ENCOUNTER — MYC MEDICAL ADVICE (OUTPATIENT)
Dept: FAMILY MEDICINE | Facility: CLINIC | Age: 30
End: 2025-08-14
Payer: COMMERCIAL

## 2025-08-14 DIAGNOSIS — Z86.32 HISTORY OF GESTATIONAL DIABETES: Primary | ICD-10-CM

## 2025-08-18 ENCOUNTER — LAB (OUTPATIENT)
Dept: LAB | Facility: CLINIC | Age: 30
End: 2025-08-18
Payer: COMMERCIAL

## 2025-08-18 DIAGNOSIS — Z86.32 HISTORY OF GESTATIONAL DIABETES: ICD-10-CM

## 2025-08-18 DIAGNOSIS — E03.9 ACQUIRED HYPOTHYROIDISM: ICD-10-CM

## 2025-08-18 LAB
EST. AVERAGE GLUCOSE BLD GHB EST-MCNC: 117 MG/DL
HBA1C MFR BLD: 5.7 % (ref 0–5.6)
TSH SERPL DL<=0.005 MIU/L-ACNC: 2.04 UIU/ML (ref 0.3–4.2)

## 2025-08-18 PROCEDURE — 36415 COLL VENOUS BLD VENIPUNCTURE: CPT

## 2025-08-18 PROCEDURE — 83036 HEMOGLOBIN GLYCOSYLATED A1C: CPT

## 2025-08-18 PROCEDURE — 84443 ASSAY THYROID STIM HORMONE: CPT

## 2025-08-27 DIAGNOSIS — E03.9 ACQUIRED HYPOTHYROIDISM: ICD-10-CM

## 2025-08-28 RX ORDER — LEVOTHYROXINE SODIUM 125 UG/1
TABLET ORAL
Qty: 90 TABLET | Refills: 0 | Status: SHIPPED | OUTPATIENT
Start: 2025-08-28

## (undated) DEVICE — SOL NACL 0.9% IRRIG 1000ML BOTTLE 2F7124

## (undated) DEVICE — MAT FLOOR SURGICAL 40X38 0702140238

## (undated) DEVICE — GOWN XXL 9575

## (undated) DEVICE — GLOVE SURG PI ULTRA TOUCH M SZ 7-1/2 LF

## (undated) DEVICE — CUSTOM PACK PERI GYN SMA5BPGHEA

## (undated) DEVICE — DRSG TELFA 3X4" 1050

## (undated) DEVICE — PREP SCRUB SOL EXIDINE 4% CHG 4OZ 29002-404

## (undated) DEVICE — TUBING VACUUM COLLECTION 6FT 23116

## (undated) DEVICE — SOL WATER IRRIG 1000ML BOTTLE 2F7114

## (undated) DEVICE — BRIEF STRETCH XL MPS40

## (undated) RX ORDER — FENTANYL CITRATE 50 UG/ML
INJECTION, SOLUTION INTRAMUSCULAR; INTRAVENOUS
Status: DISPENSED
Start: 2022-10-14